# Patient Record
Sex: FEMALE | Race: WHITE | NOT HISPANIC OR LATINO | URBAN - METROPOLITAN AREA
[De-identification: names, ages, dates, MRNs, and addresses within clinical notes are randomized per-mention and may not be internally consistent; named-entity substitution may affect disease eponyms.]

---

## 2021-09-09 ENCOUNTER — EMERGENCY (EMERGENCY)
Facility: HOSPITAL | Age: 56
LOS: 1 days | Discharge: HOME | End: 2021-09-09
Attending: EMERGENCY MEDICINE | Admitting: EMERGENCY MEDICINE
Payer: COMMERCIAL

## 2021-09-09 VITALS
RESPIRATION RATE: 18 BRPM | TEMPERATURE: 98 F | OXYGEN SATURATION: 96 % | HEART RATE: 77 BPM | SYSTOLIC BLOOD PRESSURE: 154 MMHG | DIASTOLIC BLOOD PRESSURE: 87 MMHG

## 2021-09-09 VITALS
HEIGHT: 64 IN | OXYGEN SATURATION: 99 % | TEMPERATURE: 99 F | WEIGHT: 115.08 LBS | DIASTOLIC BLOOD PRESSURE: 97 MMHG | HEART RATE: 98 BPM | SYSTOLIC BLOOD PRESSURE: 145 MMHG | RESPIRATION RATE: 18 BRPM

## 2021-09-09 DIAGNOSIS — R00.2 PALPITATIONS: ICD-10-CM

## 2021-09-09 DIAGNOSIS — I10 ESSENTIAL (PRIMARY) HYPERTENSION: ICD-10-CM

## 2021-09-09 DIAGNOSIS — Z98.890 OTHER SPECIFIED POSTPROCEDURAL STATES: ICD-10-CM

## 2021-09-09 DIAGNOSIS — F17.200 NICOTINE DEPENDENCE, UNSPECIFIED, UNCOMPLICATED: ICD-10-CM

## 2021-09-09 LAB
ALBUMIN SERPL ELPH-MCNC: 4.9 G/DL — SIGNIFICANT CHANGE UP (ref 3.3–5)
ALP SERPL-CCNC: 63 U/L — SIGNIFICANT CHANGE UP (ref 40–120)
ALT FLD-CCNC: 14 U/L — SIGNIFICANT CHANGE UP (ref 10–45)
ANION GAP SERPL CALC-SCNC: 12 MMOL/L — SIGNIFICANT CHANGE UP (ref 5–17)
AST SERPL-CCNC: 26 U/L — SIGNIFICANT CHANGE UP (ref 10–40)
BASOPHILS # BLD AUTO: 0.06 K/UL — SIGNIFICANT CHANGE UP (ref 0–0.2)
BASOPHILS NFR BLD AUTO: 0.6 % — SIGNIFICANT CHANGE UP (ref 0–2)
BILIRUB SERPL-MCNC: 0.2 MG/DL — SIGNIFICANT CHANGE UP (ref 0.2–1.2)
BUN SERPL-MCNC: 10 MG/DL — SIGNIFICANT CHANGE UP (ref 7–23)
CALCIUM SERPL-MCNC: 9.8 MG/DL — SIGNIFICANT CHANGE UP (ref 8.4–10.5)
CHLORIDE SERPL-SCNC: 96 MMOL/L — SIGNIFICANT CHANGE UP (ref 96–108)
CO2 SERPL-SCNC: 26 MMOL/L — SIGNIFICANT CHANGE UP (ref 22–31)
CREAT SERPL-MCNC: 0.48 MG/DL — LOW (ref 0.5–1.3)
D DIMER BLD IA.RAPID-MCNC: <150 NG/ML DDU — SIGNIFICANT CHANGE UP
EOSINOPHIL # BLD AUTO: 0.01 K/UL — SIGNIFICANT CHANGE UP (ref 0–0.5)
EOSINOPHIL NFR BLD AUTO: 0.1 % — SIGNIFICANT CHANGE UP (ref 0–6)
GLUCOSE SERPL-MCNC: 123 MG/DL — HIGH (ref 70–99)
HCT VFR BLD CALC: 40.7 % — SIGNIFICANT CHANGE UP (ref 34.5–45)
HGB BLD-MCNC: 13.9 G/DL — SIGNIFICANT CHANGE UP (ref 11.5–15.5)
IMM GRANULOCYTES NFR BLD AUTO: 0.5 % — SIGNIFICANT CHANGE UP (ref 0–1.5)
LYMPHOCYTES # BLD AUTO: 1.69 K/UL — SIGNIFICANT CHANGE UP (ref 1–3.3)
LYMPHOCYTES # BLD AUTO: 18.1 % — SIGNIFICANT CHANGE UP (ref 13–44)
MAGNESIUM SERPL-MCNC: 1.7 MG/DL — SIGNIFICANT CHANGE UP (ref 1.6–2.6)
MCHC RBC-ENTMCNC: 34.2 GM/DL — SIGNIFICANT CHANGE UP (ref 32–36)
MCHC RBC-ENTMCNC: 34.4 PG — HIGH (ref 27–34)
MCV RBC AUTO: 100.7 FL — HIGH (ref 80–100)
MONOCYTES # BLD AUTO: 1.07 K/UL — HIGH (ref 0–0.9)
MONOCYTES NFR BLD AUTO: 11.5 % — SIGNIFICANT CHANGE UP (ref 2–14)
NEUTROPHILS # BLD AUTO: 6.45 K/UL — SIGNIFICANT CHANGE UP (ref 1.8–7.4)
NEUTROPHILS NFR BLD AUTO: 69.2 % — SIGNIFICANT CHANGE UP (ref 43–77)
NRBC # BLD: 0 /100 WBCS — SIGNIFICANT CHANGE UP (ref 0–0)
PLATELET # BLD AUTO: 277 K/UL — SIGNIFICANT CHANGE UP (ref 150–400)
POTASSIUM SERPL-MCNC: 3.8 MMOL/L — SIGNIFICANT CHANGE UP (ref 3.5–5.3)
POTASSIUM SERPL-SCNC: 3.8 MMOL/L — SIGNIFICANT CHANGE UP (ref 3.5–5.3)
PROT SERPL-MCNC: 7.8 G/DL — SIGNIFICANT CHANGE UP (ref 6–8.3)
RBC # BLD: 4.04 M/UL — SIGNIFICANT CHANGE UP (ref 3.8–5.2)
RBC # FLD: 12.1 % — SIGNIFICANT CHANGE UP (ref 10.3–14.5)
SODIUM SERPL-SCNC: 134 MMOL/L — LOW (ref 135–145)
TROPONIN T SERPL-MCNC: 0.01 NG/ML — SIGNIFICANT CHANGE UP (ref 0–0.01)
WBC # BLD: 9.33 K/UL — SIGNIFICANT CHANGE UP (ref 3.8–10.5)
WBC # FLD AUTO: 9.33 K/UL — SIGNIFICANT CHANGE UP (ref 3.8–10.5)

## 2021-09-09 PROCEDURE — 83735 ASSAY OF MAGNESIUM: CPT

## 2021-09-09 PROCEDURE — 99285 EMERGENCY DEPT VISIT HI MDM: CPT

## 2021-09-09 PROCEDURE — 99283 EMERGENCY DEPT VISIT LOW MDM: CPT

## 2021-09-09 PROCEDURE — 85025 COMPLETE CBC W/AUTO DIFF WBC: CPT

## 2021-09-09 PROCEDURE — 93010 ELECTROCARDIOGRAM REPORT: CPT

## 2021-09-09 PROCEDURE — 84484 ASSAY OF TROPONIN QUANT: CPT

## 2021-09-09 PROCEDURE — 80053 COMPREHEN METABOLIC PANEL: CPT

## 2021-09-09 PROCEDURE — 36415 COLL VENOUS BLD VENIPUNCTURE: CPT

## 2021-09-09 PROCEDURE — 93005 ELECTROCARDIOGRAM TRACING: CPT

## 2021-09-09 PROCEDURE — 85379 FIBRIN DEGRADATION QUANT: CPT

## 2021-09-09 NOTE — ED ADULT NURSE NOTE - OBJECTIVE STATEMENT
Patient alert and oriented x 3 came c/o sudden onset of palpitations this afternoon while resting at home , went to  and sent here for evaluation  . Denies any sob  nor chest pain . Feels much better now , seen and examined by Dr. Kamara , all labs sent .safety maintained .,

## 2021-09-09 NOTE — ED ADULT NURSE NOTE - NSIMPLEMENTINTERV_GEN_ALL_ED
Implemented All Universal Safety Interventions:  [place] to call system. Call bell, personal items and telephone within reach. Instruct patient to call for assistance. Room bathroom lighting operational. Non-slip footwear when patient is off stretcher. Physically safe environment: no spills, clutter or unnecessary equipment. Stretcher in lowest position, wheels locked, appropriate side rails in place.

## 2021-09-09 NOTE — ED PROVIDER NOTE - PROGRESS NOTE DETAILS
work up wnl, requesting dc, Discussed with pt results of work up, strict return precautions, and need for follow up.  Pt expressed understanding and agrees with plan.

## 2021-09-09 NOTE — ED PROVIDER NOTE - PATIENT PORTAL LINK FT
You can access the FollowMyHealth Patient Portal offered by [facility] by registering at the following website: [email or web address]. By joining [employer]’s FollowMyHealth portal, you will also be able to view your health information using other applications (apps) compatible with our system.

## 2021-09-09 NOTE — ED PROVIDER NOTE - CLINICAL SUMMARY MEDICAL DECISION MAKING FREE TEXT BOX
57 y/o F here concerned for elevated BP and palpitations. Anxious appearing. Differentials Dx include ACS, PE, dehydration, or anxiety. Obtain lab and EKG. If no acute process, f/u w/ PCP.

## 2021-09-09 NOTE — ED PROVIDER NOTE - NSFOLLOWUPINSTRUCTIONS_ED_ALL_ED_FT
Can take tylenol 650mg every 6hrs as needed for pain.  Follow up with primary doctor within 1-2 days.  Follow up with cardiologist within 1-2 days. Can call [phone] ([facility]) to schedule appointment.   Return to ER for persistent fever/vomit, uncontrolled pain, worsening breathing, worsening lightheaded, focal weakness/numbness.    Nonspecific Chest Pain  Chest pain can be caused by many different conditions. It can be caused by a condition that is life-threatening and requires treatment right away. It can also be caused by something that is not life-threatening. If you have chest pain, it can be hard to know the difference, so it is important to get help right away to make sure that you do not have a serious condition.    Some life-threatening causes of chest pain include:  Heart attack.  A tear in the body's main blood vessel (aortic dissection).  Inflammation around your heart (pericarditis).  A problem in the lungs, such as a blood clot (pulmonary embolism) or a collapsed lung (pneumothorax).    Some non life-threatening causes of chest pain include:  Heartburn.  Anxiety or stress.  Damage to the bones, muscles, and cartilage that make up your chest wall.  Pneumonia or bronchitis.  Shingles infection (varicella-zoster virus).    Chest pain can feel like:  Pain or discomfort on the surface of your chest or deep in your chest.  Crushing, pressure, aching, or squeezing pain.  Burning or tingling.  Dull or sharp pain that is worse when you move, cough, or take a deep breath.  Pain or discomfort that is also felt in your back, neck, jaw, shoulder, or arm, or pain that spreads to any of these areas.  Your chest pain may come and go. It may also be constant. Your health care provider will do lab tests and other studies to find the cause of your pain. Treatment will depend on the cause of your chest pain.    Follow these instructions at home:  Pay attention to any changes in your symptoms. Tell your health care provider about them or any new symptoms. Follow these instructions from your health care provider.    Medicines   Take over-the-counter and prescription medicines only as told by your health care provider.  If you were prescribed an antibiotic, take it as told by your health care provider. Do not stop taking the antibiotic even if you start to feel better.    Lifestyle    Rest as directed by your health care provider.  Do not use any products that contain nicotine or tobacco, such as cigarettes and e-cigarettes. If you need help quitting, ask your health care provider.  Do not drink alcohol.  Make healthy lifestyle choices as recommended. These may include:  Getting regular exercise. Ask your health care provider to suggest some activities that are safe for you.  Eating a heart-healthy diet. This includes plenty of fresh fruits and vegetables, whole grains, low-fat (lean) protein, and low-fat dairy products. A dietitian can help you find healthy eating options.  Maintaining a healthy weight.  Managing any other health conditions you have, such as hypertension or diabetes.  Reducing stress, such as with yoga or relaxation techniques.    General instructions   Avoid any activities that bring on chest pain.  Keep all follow-up visits as told by your health care provider. This is important. This includes visits for any further testing if your chest pain does not go away.    Contact a health care provider if:  Your chest pain does not go away.  You feel depressed.  You have a fever.    Get help right away if:  Your chest pain gets worse.  You have a cough that gets worse, or you cough up blood.  You have severe pain in your abdomen.  You faint.  You have sudden, unexplained chest discomfort.  You have sudden, unexplained discomfort in your arms, back, neck, or jaw.  You have shortness of breath at any time.  You suddenly start to sweat, or your skin gets clammy.  You feel nausea or you vomit.  You suddenly feel lightheaded or dizzy.  You have severe weakness, or unexplained weakness or fatigue.  Your heart begins to beat quickly, or it feels like it is skipping beats.

## 2021-09-09 NOTE — ED PROVIDER NOTE - PHYSICAL EXAMINATION
CONSTITUTIONAL: Slightly anxious appearing. Awake, alert.   HEENT: Head is atraumatic. Eyes clear bilaterally, normal EOMI. Airway patent.  CARDIAC: Normal rate, regular rhythm.  Heart sounds S1, S2.   RESPIRATORY: Breath sounds clear and equal bilaterally. no tachypnea, respiratory distress.   GASTROINTESTINAL: Abdomen soft, non-tender, no guarding, distension.  MUSCULOSKELETAL: Spine appears normal, no midline spinal tenderness, range of motion is not limited, no muscle or joint tenderness. no bony tenderness.   NEUROLOGICAL: Alert, no focal deficits, no motor or sensory deficits.  SKIN: Skin normal color for race, warm, dry and intact. No evidence of rash.  PSYCHIATRIC: Normal mood and affect. no apparent risk to self or others.

## 2021-09-09 NOTE — ED PROVIDER NOTE - OBJECTIVE STATEMENT
57 y/o F w/ Hx of HTN presents for palpitations, concern for elevated BP. Pt report being on coumadin and ***. Denies current CP, SOB, leg swelling. Reports prior breast lump removed 10 yrs ago. Denies lightheadedness, dizziness, syncope, abd pain, V/D. States she exerted herself more than usual yesterday and was outside in the heat; normally at home. 57 y/o F w/ Hx of HTN presents for palpitations, concern for elevated BP. Pt report being on clonidine and ***. Denies current CP, SOB, leg swelling. Reports prior breast lump removed 10 yrs ago. Denies lightheadedness, dizziness, syncope, abd pain, V/D. States she exerted herself more than usual yesterday and was outside in the heat; normally at home. 57 y/o F w/ Hx of HTN presents for palpitations, concern for elevated BP. Denies current CP, SOB, leg swelling. Reports prior breast lump removed 10 yrs ago. Denies lightheadedness, dizziness, syncope, abd pain, V/D. States she exerted herself more than usual yesterday and was outside in the heat; normally at home.  Denies associated SOB, NVD, lightheaded, diaphoresis, palpitations, cough/rhinorrhea,  LE pain/swelling, focal weakness/numbness, recent travel/immobilization, abd pain, urinary complaints, f/c.  No known prior cardiac w/u.

## 2022-01-24 ENCOUNTER — EMERGENCY (EMERGENCY)
Facility: HOSPITAL | Age: 57
LOS: 1 days | Discharge: HOME | End: 2022-01-24
Attending: EMERGENCY MEDICINE | Admitting: EMERGENCY MEDICINE
Payer: COMMERCIAL

## 2022-01-24 VITALS
HEIGHT: 64 IN | WEIGHT: 119.93 LBS | OXYGEN SATURATION: 96 % | TEMPERATURE: 98 F | HEART RATE: 84 BPM | DIASTOLIC BLOOD PRESSURE: 90 MMHG | SYSTOLIC BLOOD PRESSURE: 141 MMHG | RESPIRATION RATE: 18 BRPM
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
## 2022-01-24 DIAGNOSIS — S00.83XA CONTUSION OF OTHER PART OF HEAD, INITIAL ENCOUNTER: ICD-10-CM

## 2022-01-24 DIAGNOSIS — R19.7 DIARRHEA, UNSPECIFIED: ICD-10-CM

## 2022-01-24 DIAGNOSIS — I10 ESSENTIAL (PRIMARY) HYPERTENSION: ICD-10-CM

## 2022-01-24 DIAGNOSIS — Z20.822 CONTACT WITH AND (SUSPECTED) EXPOSURE TO COVID-19: ICD-10-CM

## 2022-01-24 DIAGNOSIS — R56.9 UNSPECIFIED CONVULSIONS: ICD-10-CM
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
## 2022-01-24 DIAGNOSIS — S09.90XA UNSPECIFIED INJURY OF HEAD, INITIAL ENCOUNTER: ICD-10-CM

## 2022-01-24 PROCEDURE — 71045 X-RAY EXAM CHEST 1 VIEW: CPT | Mod: 26

## 2022-01-24 PROCEDURE — 99285 EMERGENCY DEPT VISIT HI MDM: CPT

## 2022-01-24 RX ORDER — SODIUM CHLORIDE 9 MG/ML
1000 INJECTION INTRAMUSCULAR; INTRAVENOUS; SUBCUTANEOUS ONCE
Refills: 0 | Status: COMPLETED | OUTPATIENT
Start: 2022-01-24 | End: 2022-01-24

## 2022-01-24 NOTE — ED PROVIDER NOTE - CLINICAL SUMMARY MEDICAL DECISION MAKING FREE TEXT BOX
55 y/o F pt presents to ED with seizure and head injury, unclear trigger. Plan for labs, CT head, work up for infectious source, and reassess. 57 y/o F pt presents to ED with seizure and head injury, pt poor historian, missed a recent dose of meds.  Noted hematoma to L forehead, no other  injury to body. Plan for labs, CT head, work up for infectious source/lyte abnormality, keppra load and reassess.

## 2022-01-24 NOTE — ED PROVIDER NOTE - PATIENT PORTAL LINK FT
You can access the FollowMyHealth Patient Portal offered by [facility] by registering at the following website: [email or web address]. By joining [employer]’s FollowMyHealth portal, you will also be able to view your health information using other applications (apps) compatible with our system.

## 2022-01-24 NOTE — ED PROVIDER NOTE - PROGRESS NOTE DETAILS
Pt in NAD, CTs normal, UA, ambulating,  to pick pt up,  states fu at [facility] neurology, Dr. Cohn.

## 2022-01-24 NOTE — ED PROVIDER NOTE - NSFOLLOWUPINSTRUCTIONS_ED_ALL_ED_FT
PLEASE CONTINUE TO TAKE ALL SEIZURE MEDICATIONS AS PRESCRIBED AND FOLLOW UP WITH YOUR DOCTOR AT [facility].     Seizure    A seizure is abnormal electrical activity in the brain; the specific cause may or may not be found. Prior to a seizure you may experience a warning sensation (aura) that may include fear, nausea, dizziness, and visual changes such as flashing lights of spots. Common symptoms during the seizure may include an altered mental status, rhythmic jerking movements, drooling, grunting, loss of bladder or bowel control, or tongue biting. After a seizure, you may feel confused and sleepy.     Do not swim, drive, operate machinery, or engage in any risky activity during which a seizure could cause further injury to you or others. Teach friends and family what to do if you HAVE a seizure which includes laying you on the ground with your head on a cushion and turning you to the side to keep your breathing passages clear in case of vomiting.    SEEK IMMEDIATE MEDICAL CARE IF YOU HAVE ANY OF THE FOLLOWING SYMPTOMS: seizure lasting over 5 minutes, not waking up or persistent altered mental status after the seizure, or more frequent or worsening seizures.

## 2022-01-24 NOTE — ED ADULT TRIAGE NOTE - CHIEF COMPLAINT QUOTE
Pt reports seizure. Per family tonic clonic seizure tonight lasting "12 minutes." Post-ictal for EMS. Pt head on ground, hematoma to L forehead. Pt Aox3 at this time.

## 2022-01-24 NOTE — ED ADULT TRIAGE NOTE - WILL THE PATIENT ACCEPT THE PFIZER COVID-19 VACCINE IF ELIGIBLE AND IT IS AVAILABLE?
Not applicable
Monica Lundberg  (RN)  2018 13:32:25
Statement Selected
Isreal Williamson)  2018 19:07:35

## 2022-01-24 NOTE — ED PROVIDER NOTE - OBJECTIVE STATEMENT
55 y/o F pt with PMhx of HTN and epilepsy presents to ED c/o seizure x 1 episode earlier today. Pt has no recollection of events, but her  states he heard a loud thud in the opposite room. In ED, pt with hematoma to L forehead, has some associated diarrhea, but denies cough, fever, chills, abdominal pain, shortness of breath, chest pain, or any other acute complaints. Pt takes Keppra 500mg BID, but missed her dose on Friday. She is vaccinated for covid-19 x 2, last dose 4 months ago.

## 2022-01-24 NOTE — ED PROVIDER NOTE - MUSCULOSKELETAL, MLM
Spine appears normal, range of motion is not limited, no muscle or joint tenderness, hematoma to L forehead.

## 2022-01-25 VITALS
SYSTOLIC BLOOD PRESSURE: 129 MMHG | HEART RATE: 90 BPM | OXYGEN SATURATION: 98 % | TEMPERATURE: 99 F | DIASTOLIC BLOOD PRESSURE: 84 MMHG | RESPIRATION RATE: 18 BRPM

## 2022-01-25 PROBLEM — I10 ESSENTIAL (PRIMARY) HYPERTENSION: Chronic | Status: ACTIVE | Noted: 2021-09-09

## 2022-01-25 LAB
ALBUMIN SERPL ELPH-MCNC: 4.5 G/DL — SIGNIFICANT CHANGE UP (ref 3.3–5)
ALP SERPL-CCNC: 83 U/L — SIGNIFICANT CHANGE UP (ref 40–120)
ALT FLD-CCNC: 39 U/L — SIGNIFICANT CHANGE UP (ref 10–45)
AMPHET UR-MCNC: NEGATIVE — SIGNIFICANT CHANGE UP
ANION GAP SERPL CALC-SCNC: 15 MMOL/L — SIGNIFICANT CHANGE UP (ref 5–17)
APPEARANCE UR: CLEAR — SIGNIFICANT CHANGE UP
AST SERPL-CCNC: 78 U/L — HIGH (ref 10–40)
BARBITURATES UR SCN-MCNC: NEGATIVE — SIGNIFICANT CHANGE UP
BASOPHILS # BLD AUTO: 0.05 K/UL — SIGNIFICANT CHANGE UP (ref 0–0.2)
BASOPHILS NFR BLD AUTO: 0.5 % — SIGNIFICANT CHANGE UP (ref 0–2)
BENZODIAZ UR-MCNC: NEGATIVE — SIGNIFICANT CHANGE UP
BILIRUB SERPL-MCNC: 0.5 MG/DL — SIGNIFICANT CHANGE UP (ref 0.2–1.2)
BILIRUB UR-MCNC: NEGATIVE — SIGNIFICANT CHANGE UP
BUN SERPL-MCNC: 4 MG/DL — LOW (ref 7–23)
CALCIUM SERPL-MCNC: 8.8 MG/DL — SIGNIFICANT CHANGE UP (ref 8.4–10.5)
CHLORIDE SERPL-SCNC: 90 MMOL/L — LOW (ref 96–108)
CO2 SERPL-SCNC: 25 MMOL/L — SIGNIFICANT CHANGE UP (ref 22–31)
COCAINE METAB.OTHER UR-MCNC: NEGATIVE — SIGNIFICANT CHANGE UP
COLOR SPEC: YELLOW — SIGNIFICANT CHANGE UP
CREAT SERPL-MCNC: 0.3 MG/DL — LOW (ref 0.5–1.3)
DIFF PNL FLD: NEGATIVE — SIGNIFICANT CHANGE UP
EOSINOPHIL # BLD AUTO: 0 K/UL — SIGNIFICANT CHANGE UP (ref 0–0.5)
EOSINOPHIL NFR BLD AUTO: 0 % — SIGNIFICANT CHANGE UP (ref 0–6)
GLUCOSE SERPL-MCNC: 134 MG/DL — HIGH (ref 70–99)
GLUCOSE UR QL: NEGATIVE — SIGNIFICANT CHANGE UP
HCT VFR BLD CALC: 33.8 % — LOW (ref 34.5–45)
HGB BLD-MCNC: 12 G/DL — SIGNIFICANT CHANGE UP (ref 11.5–15.5)
IMM GRANULOCYTES NFR BLD AUTO: 0.7 % — SIGNIFICANT CHANGE UP (ref 0–1.5)
KETONES UR-MCNC: 15 MG/DL
LEUKOCYTE ESTERASE UR-ACNC: NEGATIVE — SIGNIFICANT CHANGE UP
LYMPHOCYTES # BLD AUTO: 0.63 K/UL — LOW (ref 1–3.3)
LYMPHOCYTES # BLD AUTO: 5.9 % — LOW (ref 13–44)
MCHC RBC-ENTMCNC: 35.5 GM/DL — SIGNIFICANT CHANGE UP (ref 32–36)
MCHC RBC-ENTMCNC: 35.6 PG — HIGH (ref 27–34)
MCV RBC AUTO: 100.3 FL — HIGH (ref 80–100)
METHADONE UR-MCNC: NEGATIVE — SIGNIFICANT CHANGE UP
MONOCYTES # BLD AUTO: 1.17 K/UL — HIGH (ref 0–0.9)
MONOCYTES NFR BLD AUTO: 10.9 % — SIGNIFICANT CHANGE UP (ref 2–14)
NEUTROPHILS # BLD AUTO: 8.8 K/UL — HIGH (ref 1.8–7.4)
NEUTROPHILS NFR BLD AUTO: 82 % — HIGH (ref 43–77)
NITRITE UR-MCNC: NEGATIVE — SIGNIFICANT CHANGE UP
NRBC # BLD: 0 /100 WBCS — SIGNIFICANT CHANGE UP (ref 0–0)
OPIATES UR-MCNC: NEGATIVE — SIGNIFICANT CHANGE UP
PCP SPEC-MCNC: SIGNIFICANT CHANGE UP
PCP UR-MCNC: NEGATIVE — SIGNIFICANT CHANGE UP
PH UR: 6.5 — SIGNIFICANT CHANGE UP (ref 5–8)
PLATELET # BLD AUTO: 197 K/UL — SIGNIFICANT CHANGE UP (ref 150–400)
POTASSIUM SERPL-MCNC: 3.7 MMOL/L — SIGNIFICANT CHANGE UP (ref 3.5–5.3)
POTASSIUM SERPL-SCNC: 3.7 MMOL/L — SIGNIFICANT CHANGE UP (ref 3.5–5.3)
PROT SERPL-MCNC: 6.7 G/DL — SIGNIFICANT CHANGE UP (ref 6–8.3)
PROT UR-MCNC: NEGATIVE MG/DL — SIGNIFICANT CHANGE UP
RBC # BLD: 3.37 M/UL — LOW (ref 3.8–5.2)
RBC # FLD: 13.9 % — SIGNIFICANT CHANGE UP (ref 10.3–14.5)
SARS-COV-2 RNA SPEC QL NAA+PROBE: NEGATIVE — SIGNIFICANT CHANGE UP
SODIUM SERPL-SCNC: 130 MMOL/L — LOW (ref 135–145)
SP GR SPEC: 1.01 — SIGNIFICANT CHANGE UP (ref 1–1.03)
THC UR QL: NEGATIVE — SIGNIFICANT CHANGE UP
UROBILINOGEN FLD QL: 0.2 E.U./DL — SIGNIFICANT CHANGE UP
WBC # BLD: 10.72 K/UL — HIGH (ref 3.8–10.5)
WBC # FLD AUTO: 10.72 K/UL — HIGH (ref 3.8–10.5)

## 2022-01-25 PROCEDURE — 80307 DRUG TEST PRSMV CHEM ANLYZR: CPT

## 2022-01-25 PROCEDURE — 96374 THER/PROPH/DIAG INJ IV PUSH: CPT

## 2022-01-25 PROCEDURE — 70486 CT MAXILLOFACIAL W/O DYE: CPT | Mod: 26,MA

## 2022-01-25 PROCEDURE — 70450 CT HEAD/BRAIN W/O DYE: CPT | Mod: MA

## 2022-01-25 PROCEDURE — 85025 COMPLETE CBC W/AUTO DIFF WBC: CPT

## 2022-01-25 PROCEDURE — 81003 URINALYSIS AUTO W/O SCOPE: CPT

## 2022-01-25 PROCEDURE — 99285 EMERGENCY DEPT VISIT HI MDM: CPT | Mod: 25

## 2022-01-25 PROCEDURE — 71045 X-RAY EXAM CHEST 1 VIEW: CPT

## 2022-01-25 PROCEDURE — 87635 SARS-COV-2 COVID-19 AMP PRB: CPT

## 2022-01-25 PROCEDURE — 70450 CT HEAD/BRAIN W/O DYE: CPT | Mod: 26,MA

## 2022-01-25 PROCEDURE — 93005 ELECTROCARDIOGRAM TRACING: CPT

## 2022-01-25 PROCEDURE — 36415 COLL VENOUS BLD VENIPUNCTURE: CPT

## 2022-01-25 PROCEDURE — 70486 CT MAXILLOFACIAL W/O DYE: CPT | Mod: MA

## 2022-01-25 PROCEDURE — 80053 COMPREHEN METABOLIC PANEL: CPT

## 2022-01-25 RX ORDER — LEVETIRACETAM 250 MG/1
1000 TABLET, FILM COATED ORAL ONCE
Refills: 0 | Status: COMPLETED | OUTPATIENT
Start: 2022-01-25 | End: 2022-01-25

## 2022-01-25 RX ADMIN — SODIUM CHLORIDE 2000 MILLILITER(S): 9 INJECTION INTRAMUSCULAR; INTRAVENOUS; SUBCUTANEOUS at 00:09

## 2022-01-25 RX ADMIN — LEVETIRACETAM 400 MILLIGRAM(S): 250 TABLET, FILM COATED ORAL at 01:03

## 2022-01-25 NOTE — ED ADULT NURSE NOTE - OBJECTIVE STATEMENT
received A&OX3, but non-compliant with her seziure medication pt with c/o of ' I had a seizure.' pt doesn't remember the event, but the  heard " loud thud" when the pt fell. pt denies chest pain, headache. pt is on Keppra according the , but missed a dosage on Friday.

## 2022-07-27 ENCOUNTER — INPATIENT (INPATIENT)
Facility: HOSPITAL | Age: 57
LOS: 1 days | Discharge: HOME | DRG: 896 | End: 2022-07-29
Attending: STUDENT IN AN ORGANIZED HEALTH CARE EDUCATION/TRAINING PROGRAM | Admitting: STUDENT IN AN ORGANIZED HEALTH CARE EDUCATION/TRAINING PROGRAM
Payer: COMMERCIAL

## 2022-07-27 VITALS
DIASTOLIC BLOOD PRESSURE: 95 MMHG | SYSTOLIC BLOOD PRESSURE: 167 MMHG | RESPIRATION RATE: 16 BRPM | HEIGHT: 64 IN | WEIGHT: 119.93 LBS | HEART RATE: 76 BPM | OXYGEN SATURATION: 98 % | TEMPERATURE: 98 F

## 2022-07-27 DIAGNOSIS — E87.2 ACIDOSIS: ICD-10-CM

## 2022-07-27 DIAGNOSIS — D64.9 ANEMIA, UNSPECIFIED: ICD-10-CM

## 2022-07-27 DIAGNOSIS — F10.239 ALCOHOL DEPENDENCE WITH WITHDRAWAL, UNSPECIFIED: ICD-10-CM

## 2022-07-27 DIAGNOSIS — R63.8 OTHER SYMPTOMS AND SIGNS CONCERNING FOOD AND FLUID INTAKE: ICD-10-CM

## 2022-07-27 DIAGNOSIS — R56.9 UNSPECIFIED CONVULSIONS: ICD-10-CM

## 2022-07-27 DIAGNOSIS — E87.1 HYPO-OSMOLALITY AND HYPONATREMIA: ICD-10-CM

## 2022-07-27 DIAGNOSIS — I10 ESSENTIAL (PRIMARY) HYPERTENSION: ICD-10-CM

## 2022-07-27 PROBLEM — G40.909 EPILEPSY, UNSPECIFIED, NOT INTRACTABLE, WITHOUT STATUS EPILEPTICUS: Chronic | Status: ACTIVE | Noted: 2022-01-25

## 2022-07-27 LAB
ALBUMIN SERPL ELPH-MCNC: 4.6 G/DL — SIGNIFICANT CHANGE UP (ref 3.3–5)
ALP SERPL-CCNC: 111 U/L — SIGNIFICANT CHANGE UP (ref 40–120)
ALT FLD-CCNC: 26 U/L — SIGNIFICANT CHANGE UP (ref 10–45)
AMPHET UR-MCNC: NEGATIVE — SIGNIFICANT CHANGE UP
ANION GAP SERPL CALC-SCNC: 12 MMOL/L — SIGNIFICANT CHANGE UP (ref 5–17)
ANION GAP SERPL CALC-SCNC: 24 MMOL/L — HIGH (ref 5–17)
APAP SERPL-MCNC: <5 UG/ML — LOW (ref 10–30)
APPEARANCE UR: CLEAR — SIGNIFICANT CHANGE UP
AST SERPL-CCNC: 55 U/L — HIGH (ref 10–40)
B-OH-BUTYR SERPL-SCNC: 1.1 MMOL/L — HIGH
BACTERIA # UR AUTO: PRESENT /HPF
BARBITURATES UR SCN-MCNC: NEGATIVE — SIGNIFICANT CHANGE UP
BASE EXCESS BLDV CALC-SCNC: 2.9 MMOL/L — SIGNIFICANT CHANGE UP (ref -2–3)
BASOPHILS # BLD AUTO: 0.03 K/UL — SIGNIFICANT CHANGE UP (ref 0–0.2)
BASOPHILS # BLD AUTO: 0.08 K/UL — SIGNIFICANT CHANGE UP (ref 0–0.2)
BASOPHILS NFR BLD AUTO: 0.5 % — SIGNIFICANT CHANGE UP (ref 0–2)
BASOPHILS NFR BLD AUTO: 0.9 % — SIGNIFICANT CHANGE UP (ref 0–2)
BENZODIAZ UR-MCNC: NEGATIVE — SIGNIFICANT CHANGE UP
BILIRUB SERPL-MCNC: 0.4 MG/DL — SIGNIFICANT CHANGE UP (ref 0.2–1.2)
BILIRUB UR-MCNC: NEGATIVE — SIGNIFICANT CHANGE UP
BLD GP AB SCN SERPL QL: NEGATIVE — SIGNIFICANT CHANGE UP
BUN SERPL-MCNC: 3 MG/DL — LOW (ref 7–23)
BUN SERPL-MCNC: 4 MG/DL — LOW (ref 7–23)
CA-I SERPL-SCNC: 1.11 MMOL/L — LOW (ref 1.15–1.33)
CALCIUM SERPL-MCNC: 8.7 MG/DL — SIGNIFICANT CHANGE UP (ref 8.4–10.5)
CALCIUM SERPL-MCNC: 9 MG/DL — SIGNIFICANT CHANGE UP (ref 8.4–10.5)
CHLORIDE SERPL-SCNC: 100 MMOL/L — SIGNIFICANT CHANGE UP (ref 96–108)
CHLORIDE SERPL-SCNC: 84 MMOL/L — LOW (ref 96–108)
CK MB CFR SERPL CALC: 7.1 NG/ML — HIGH (ref 0–6.7)
CK SERPL-CCNC: 269 U/L — HIGH (ref 25–170)
CO2 BLDV-SCNC: 29.2 MMOL/L — HIGH (ref 22–26)
CO2 SERPL-SCNC: 19 MMOL/L — LOW (ref 22–31)
CO2 SERPL-SCNC: 24 MMOL/L — SIGNIFICANT CHANGE UP (ref 22–31)
COCAINE METAB.OTHER UR-MCNC: NEGATIVE — SIGNIFICANT CHANGE UP
COD CRY URNS QL: ABNORMAL /HPF
COLOR SPEC: YELLOW — SIGNIFICANT CHANGE UP
CREAT ?TM UR-MCNC: 29 MG/DL — SIGNIFICANT CHANGE UP
CREAT ?TM UR-MCNC: 96 MG/DL — SIGNIFICANT CHANGE UP
CREAT SERPL-MCNC: 0.34 MG/DL — LOW (ref 0.5–1.3)
CREAT SERPL-MCNC: 0.38 MG/DL — LOW (ref 0.5–1.3)
DIFF PNL FLD: ABNORMAL
EGFR: 118 ML/MIN/1.73M2 — SIGNIFICANT CHANGE UP
EGFR: 121 ML/MIN/1.73M2 — SIGNIFICANT CHANGE UP
EOSINOPHIL # BLD AUTO: 0 K/UL — SIGNIFICANT CHANGE UP (ref 0–0.5)
EOSINOPHIL # BLD AUTO: 0.01 K/UL — SIGNIFICANT CHANGE UP (ref 0–0.5)
EOSINOPHIL NFR BLD AUTO: 0 % — SIGNIFICANT CHANGE UP (ref 0–6)
EOSINOPHIL NFR BLD AUTO: 0.2 % — SIGNIFICANT CHANGE UP (ref 0–6)
EPI CELLS # UR: SIGNIFICANT CHANGE UP /HPF (ref 0–5)
ETHANOL SERPL-MCNC: <10 MG/DL — SIGNIFICANT CHANGE UP (ref 0–10)
FERRITIN SERPL-MCNC: 64 NG/ML — SIGNIFICANT CHANGE UP (ref 15–150)
GAS PNL BLDV: 126 MMOL/L — LOW (ref 136–145)
GAS PNL BLDV: SIGNIFICANT CHANGE UP
GAS PNL BLDV: SIGNIFICANT CHANGE UP
GLUCOSE SERPL-MCNC: 124 MG/DL — HIGH (ref 70–99)
GLUCOSE SERPL-MCNC: 166 MG/DL — HIGH (ref 70–99)
GLUCOSE UR QL: 100
HCG SERPL-ACNC: 0 MIU/ML — SIGNIFICANT CHANGE UP
HCO3 BLDV-SCNC: 28 MMOL/L — SIGNIFICANT CHANGE UP (ref 22–29)
HCT VFR BLD CALC: 31 % — LOW (ref 34.5–45)
HCT VFR BLD CALC: 33.3 % — LOW (ref 34.5–45)
HGB BLD-MCNC: 10 G/DL — LOW (ref 11.5–15.5)
HGB BLD-MCNC: 10.7 G/DL — LOW (ref 11.5–15.5)
IMM GRANULOCYTES NFR BLD AUTO: 0.3 % — SIGNIFICANT CHANGE UP (ref 0–1.5)
IMM GRANULOCYTES NFR BLD AUTO: 0.5 % — SIGNIFICANT CHANGE UP (ref 0–1.5)
IRON SATN MFR SERPL: 23 % — SIGNIFICANT CHANGE UP (ref 14–50)
IRON SATN MFR SERPL: 84 UG/DL — SIGNIFICANT CHANGE UP (ref 30–160)
KETONES UR-MCNC: 15 MG/DL
LACTATE SERPL-SCNC: 1.3 MMOL/L — SIGNIFICANT CHANGE UP (ref 0.5–2)
LEUKOCYTE ESTERASE UR-ACNC: ABNORMAL
LYMPHOCYTES # BLD AUTO: 0.55 K/UL — LOW (ref 1–3.3)
LYMPHOCYTES # BLD AUTO: 0.75 K/UL — LOW (ref 1–3.3)
LYMPHOCYTES # BLD AUTO: 8.5 % — LOW (ref 13–44)
LYMPHOCYTES # BLD AUTO: 8.6 % — LOW (ref 13–44)
MAGNESIUM SERPL-MCNC: 2.4 MG/DL — SIGNIFICANT CHANGE UP (ref 1.6–2.6)
MCHC RBC-ENTMCNC: 29.2 PG — SIGNIFICANT CHANGE UP (ref 27–34)
MCHC RBC-ENTMCNC: 29.3 PG — SIGNIFICANT CHANGE UP (ref 27–34)
MCHC RBC-ENTMCNC: 32.1 GM/DL — SIGNIFICANT CHANGE UP (ref 32–36)
MCHC RBC-ENTMCNC: 32.3 GM/DL — SIGNIFICANT CHANGE UP (ref 32–36)
MCV RBC AUTO: 90.4 FL — SIGNIFICANT CHANGE UP (ref 80–100)
MCV RBC AUTO: 91.2 FL — SIGNIFICANT CHANGE UP (ref 80–100)
METHADONE UR-MCNC: NEGATIVE — SIGNIFICANT CHANGE UP
MONOCYTES # BLD AUTO: 1.03 K/UL — HIGH (ref 0–0.9)
MONOCYTES # BLD AUTO: 1.17 K/UL — HIGH (ref 0–0.9)
MONOCYTES NFR BLD AUTO: 13.5 % — SIGNIFICANT CHANGE UP (ref 2–14)
MONOCYTES NFR BLD AUTO: 16 % — HIGH (ref 2–14)
NEUTROPHILS # BLD AUTO: 4.81 K/UL — SIGNIFICANT CHANGE UP (ref 1.8–7.4)
NEUTROPHILS # BLD AUTO: 6.65 K/UL — SIGNIFICANT CHANGE UP (ref 1.8–7.4)
NEUTROPHILS NFR BLD AUTO: 74.5 % — SIGNIFICANT CHANGE UP (ref 43–77)
NEUTROPHILS NFR BLD AUTO: 76.5 % — SIGNIFICANT CHANGE UP (ref 43–77)
NITRITE UR-MCNC: NEGATIVE — SIGNIFICANT CHANGE UP
NRBC # BLD: 0 /100 WBCS — SIGNIFICANT CHANGE UP (ref 0–0)
NRBC # BLD: 0 /100 WBCS — SIGNIFICANT CHANGE UP (ref 0–0)
OPIATES UR-MCNC: NEGATIVE — SIGNIFICANT CHANGE UP
OSMOLALITY SERPL: 263 MOSM/KG — LOW (ref 275–300)
OSMOLALITY UR: 252 MOSM/KG — LOW (ref 300–900)
OSMOLALITY UR: 303 MOSM/KG — SIGNIFICANT CHANGE UP (ref 300–900)
PCO2 BLDV: 43 MMHG — HIGH (ref 39–42)
PCP SPEC-MCNC: SIGNIFICANT CHANGE UP
PCP UR-MCNC: NEGATIVE — SIGNIFICANT CHANGE UP
PH BLDV: 7.42 — SIGNIFICANT CHANGE UP (ref 7.32–7.43)
PH UR: 7.5 — SIGNIFICANT CHANGE UP (ref 5–8)
PHOSPHATE SERPL-MCNC: 2.4 MG/DL — LOW (ref 2.5–4.5)
PLATELET # BLD AUTO: 222 K/UL — SIGNIFICANT CHANGE UP (ref 150–400)
PLATELET # BLD AUTO: 238 K/UL — SIGNIFICANT CHANGE UP (ref 150–400)
PO2 BLDV: 40 MMHG — SIGNIFICANT CHANGE UP (ref 25–45)
POTASSIUM BLDV-SCNC: 3.4 MMOL/L — LOW (ref 3.5–5.1)
POTASSIUM SERPL-MCNC: 3.2 MMOL/L — LOW (ref 3.5–5.3)
POTASSIUM SERPL-MCNC: 3.5 MMOL/L — SIGNIFICANT CHANGE UP (ref 3.5–5.3)
POTASSIUM SERPL-SCNC: 3.2 MMOL/L — LOW (ref 3.5–5.3)
POTASSIUM SERPL-SCNC: 3.5 MMOL/L — SIGNIFICANT CHANGE UP (ref 3.5–5.3)
PROT SERPL-MCNC: 7.8 G/DL — SIGNIFICANT CHANGE UP (ref 6–8.3)
PROT UR-MCNC: ABNORMAL MG/DL
RBC # BLD: 3.43 M/UL — LOW (ref 3.8–5.2)
RBC # BLD: 3.43 M/UL — LOW (ref 3.8–5.2)
RBC # BLD: 3.65 M/UL — LOW (ref 3.8–5.2)
RBC # FLD: 18.7 % — HIGH (ref 10.3–14.5)
RBC # FLD: 18.8 % — HIGH (ref 10.3–14.5)
RBC CASTS # UR COMP ASSIST: < 5 /HPF — SIGNIFICANT CHANGE UP
RETICS #: 62.8 K/UL — SIGNIFICANT CHANGE UP (ref 25–125)
RETICS/RBC NFR: 1.8 % — SIGNIFICANT CHANGE UP (ref 0.5–2.5)
RH IG SCN BLD-IMP: POSITIVE — SIGNIFICANT CHANGE UP
SALICYLATES SERPL-MCNC: <0.3 MG/DL — LOW (ref 2.8–20)
SAO2 % BLDV: 63.4 % — LOW (ref 67–88)
SARS-COV-2 RNA SPEC QL NAA+PROBE: NEGATIVE — SIGNIFICANT CHANGE UP
SODIUM SERPL-SCNC: 127 MMOL/L — LOW (ref 135–145)
SODIUM SERPL-SCNC: 136 MMOL/L — SIGNIFICANT CHANGE UP (ref 135–145)
SODIUM UR-SCNC: 54 MMOL/L — SIGNIFICANT CHANGE UP
SODIUM UR-SCNC: 66 MMOL/L — SIGNIFICANT CHANGE UP
SP GR SPEC: 1.01 — SIGNIFICANT CHANGE UP (ref 1–1.03)
THC UR QL: NEGATIVE — SIGNIFICANT CHANGE UP
TIBC SERPL-MCNC: 360 UG/DL — SIGNIFICANT CHANGE UP (ref 220–430)
TRANSFERRIN SERPL-MCNC: 298 MG/DL — SIGNIFICANT CHANGE UP (ref 200–360)
TSH SERPL-MCNC: 1.88 UIU/ML — SIGNIFICANT CHANGE UP (ref 0.27–4.2)
UIBC SERPL-MCNC: 276 UG/DL — SIGNIFICANT CHANGE UP (ref 110–370)
UROBILINOGEN FLD QL: 0.2 E.U./DL — SIGNIFICANT CHANGE UP
WBC # BLD: 6.45 K/UL — SIGNIFICANT CHANGE UP (ref 3.8–10.5)
WBC # BLD: 8.69 K/UL — SIGNIFICANT CHANGE UP (ref 3.8–10.5)
WBC # FLD AUTO: 6.45 K/UL — SIGNIFICANT CHANGE UP (ref 3.8–10.5)
WBC # FLD AUTO: 8.69 K/UL — SIGNIFICANT CHANGE UP (ref 3.8–10.5)
WBC UR QL: < 5 /HPF — SIGNIFICANT CHANGE UP

## 2022-07-27 PROCEDURE — 70450 CT HEAD/BRAIN W/O DYE: CPT | Mod: 26,MA

## 2022-07-27 PROCEDURE — 99223 1ST HOSP IP/OBS HIGH 75: CPT | Mod: GC

## 2022-07-27 PROCEDURE — 99291 CRITICAL CARE FIRST HOUR: CPT

## 2022-07-27 PROCEDURE — 99223 1ST HOSP IP/OBS HIGH 75: CPT

## 2022-07-27 PROCEDURE — 71045 X-RAY EXAM CHEST 1 VIEW: CPT | Mod: 26

## 2022-07-27 PROCEDURE — 93010 ELECTROCARDIOGRAM REPORT: CPT

## 2022-07-27 PROCEDURE — 99252 IP/OBS CONSLTJ NEW/EST SF 35: CPT | Mod: GC

## 2022-07-27 PROCEDURE — 72125 CT NECK SPINE W/O DYE: CPT | Mod: 26,MA

## 2022-07-27 RX ORDER — SODIUM CHLORIDE 9 MG/ML
1000 INJECTION, SOLUTION INTRAVENOUS
Refills: 0 | Status: DISCONTINUED | OUTPATIENT
Start: 2022-07-27 | End: 2022-07-27

## 2022-07-27 RX ORDER — LEVETIRACETAM 250 MG/1
250 TABLET, FILM COATED ORAL ONCE
Refills: 0 | Status: COMPLETED | OUTPATIENT
Start: 2022-07-27 | End: 2022-07-27

## 2022-07-27 RX ORDER — LEVETIRACETAM 250 MG/1
750 TABLET, FILM COATED ORAL DAILY
Refills: 0 | Status: DISCONTINUED | OUTPATIENT
Start: 2022-07-28 | End: 2022-07-29

## 2022-07-27 RX ORDER — ONDANSETRON 8 MG/1
4 TABLET, FILM COATED ORAL EVERY 8 HOURS
Refills: 0 | Status: DISCONTINUED | OUTPATIENT
Start: 2022-07-27 | End: 2022-07-27

## 2022-07-27 RX ORDER — DIAZEPAM 5 MG
5 TABLET ORAL
Refills: 0 | Status: DISCONTINUED | OUTPATIENT
Start: 2022-07-27 | End: 2022-07-29

## 2022-07-27 RX ORDER — LEVETIRACETAM 250 MG/1
500 TABLET, FILM COATED ORAL EVERY 12 HOURS
Refills: 0 | Status: DISCONTINUED | OUTPATIENT
Start: 2022-07-27 | End: 2022-07-27

## 2022-07-27 RX ORDER — MAGNESIUM SULFATE 500 MG/ML
2 VIAL (ML) INJECTION ONCE
Refills: 0 | Status: COMPLETED | OUTPATIENT
Start: 2022-07-27 | End: 2022-07-27

## 2022-07-27 RX ORDER — ENOXAPARIN SODIUM 100 MG/ML
40 INJECTION SUBCUTANEOUS EVERY 24 HOURS
Refills: 0 | Status: DISCONTINUED | OUTPATIENT
Start: 2022-07-27 | End: 2022-07-29

## 2022-07-27 RX ORDER — THIAMINE MONONITRATE (VIT B1) 100 MG
500 TABLET ORAL DAILY
Refills: 0 | Status: COMPLETED | OUTPATIENT
Start: 2022-07-27 | End: 2022-07-29

## 2022-07-27 RX ORDER — ACETAMINOPHEN 500 MG
650 TABLET ORAL EVERY 6 HOURS
Refills: 0 | Status: DISCONTINUED | OUTPATIENT
Start: 2022-07-27 | End: 2022-07-29

## 2022-07-27 RX ORDER — POTASSIUM CHLORIDE 20 MEQ
40 PACKET (EA) ORAL EVERY 4 HOURS
Refills: 0 | Status: COMPLETED | OUTPATIENT
Start: 2022-07-27 | End: 2022-07-27

## 2022-07-27 RX ORDER — SODIUM CHLORIDE 9 MG/ML
1000 INJECTION INTRAMUSCULAR; INTRAVENOUS; SUBCUTANEOUS ONCE
Refills: 0 | Status: COMPLETED | OUTPATIENT
Start: 2022-07-27 | End: 2022-07-27

## 2022-07-27 RX ORDER — THIAMINE MONONITRATE (VIT B1) 100 MG
100 TABLET ORAL ONCE
Refills: 0 | Status: COMPLETED | OUTPATIENT
Start: 2022-07-27 | End: 2022-07-27

## 2022-07-27 RX ORDER — LANOLIN ALCOHOL/MO/W.PET/CERES
3 CREAM (GRAM) TOPICAL AT BEDTIME
Refills: 0 | Status: DISCONTINUED | OUTPATIENT
Start: 2022-07-27 | End: 2022-07-27

## 2022-07-27 RX ADMIN — LEVETIRACETAM 250 MILLIGRAM(S): 250 TABLET, FILM COATED ORAL at 18:34

## 2022-07-27 RX ADMIN — Medication 2 MILLIGRAM(S): at 01:52

## 2022-07-27 RX ADMIN — Medication 40 MILLIEQUIVALENT(S): at 18:34

## 2022-07-27 RX ADMIN — SODIUM CHLORIDE 100 MILLILITER(S): 9 INJECTION, SOLUTION INTRAVENOUS at 10:25

## 2022-07-27 RX ADMIN — SODIUM CHLORIDE 1000 MILLILITER(S): 9 INJECTION, SOLUTION INTRAVENOUS at 06:07

## 2022-07-27 RX ADMIN — Medication 100 MILLIGRAM(S): at 06:07

## 2022-07-27 RX ADMIN — Medication 40 MILLIEQUIVALENT(S): at 14:58

## 2022-07-27 RX ADMIN — SODIUM CHLORIDE 1000 MILLILITER(S): 9 INJECTION INTRAMUSCULAR; INTRAVENOUS; SUBCUTANEOUS at 01:52

## 2022-07-27 RX ADMIN — Medication 105 MILLIGRAM(S): at 11:50

## 2022-07-27 RX ADMIN — Medication 25 GRAM(S): at 08:02

## 2022-07-27 RX ADMIN — LEVETIRACETAM 400 MILLIGRAM(S): 250 TABLET, FILM COATED ORAL at 06:51

## 2022-07-27 NOTE — H&P ADULT - PROBLEM SELECTOR PLAN 3
Hx of HTN. /95 on admission. Currently stable.  Home meds: clonidine .1mg BID, hydralazine 25mg QID with likely poor adherence.    - continue to monitor BPs  - if hypertensive, can consider starting home hydralazine AG 24 w/ bicarb 19 on admission likely 2/2 starvation ketosis as elevated BHB and normal lactate. No osmolar gap.  . Calcium oxalate crystals on UA concerning for toxic ingestion.     - f/u repeat BMP   - monitor Is and Os AG 24 w/ bicarb 19 on admission likely 2/2 starvation ketosis as elevated BHB and normal lactate. No osmolar gap.     - f/u repeat BMP   - monitor Is and Os

## 2022-07-27 NOTE — ED ADULT TRIAGE NOTE - CHIEF COMPLAINT QUOTE
pt bibems, c/o witnessed seizure while in bed. per , pt seized ~2 minutes. denies hitting head. hx of seizures, on keppra.

## 2022-07-27 NOTE — H&P ADULT - NSHPPHYSICALEXAM_GEN_ALL_CORE
PHYSICAL EXAM:    Vital Signs Last 24 Hrs  T(C): 36.9 (27 Jul 2022 01:10), Max: 36.9 (27 Jul 2022 01:10)  T(F): 98.5 (27 Jul 2022 01:10), Max: 98.5 (27 Jul 2022 01:10)  HR: 66 (27 Jul 2022 06:24) (62 - 76)  BP: 135/91 (27 Jul 2022 06:24) (131/95 - 167/95)  BP(mean): --  RR: 16 (27 Jul 2022 06:24) (16 - 16)  SpO2: 97% (27 Jul 2022 06:24) (97% - 98%)    Parameters below as of 27 Jul 2022 06:24  Patient On (Oxygen Delivery Method): room air      I&O's Summary      General: NAD, well developed  HEENT: NC/AT; EOMI, PERRLA, anicteric sclera; moist mucosal membranes.  Neck: supple, trachea midline  Cardiovascular: RRR, +S1/S2; NO M/R/G  Respiratory: CTA B/L; no W/R/R  Gastrointestinal: soft, NT/ND; +BSx4  Extremities: WWP; no edema or cyanosis  Vascular: 2+ radial, DP/PT pulses B/L  Neurological: AAOx3; no focal deficits PHYSICAL EXAM:    Vital Signs Last 24 Hrs  T(C): 36.9 (27 Jul 2022 01:10), Max: 36.9 (27 Jul 2022 01:10)  T(F): 98.5 (27 Jul 2022 01:10), Max: 98.5 (27 Jul 2022 01:10)  HR: 66 (27 Jul 2022 06:24) (62 - 76)  BP: 135/91 (27 Jul 2022 06:24) (131/95 - 167/95)  BP(mean): --  RR: 16 (27 Jul 2022 06:24) (16 - 16)  SpO2: 97% (27 Jul 2022 06:24) (97% - 98%)    Parameters below as of 27 Jul 2022 06:24  Patient On (Oxygen Delivery Method): room air      I&O's Summary      General: disheveled, sitting up in bed anxious asking for blankets  HEENT: NC/AT; EOMI, PERRLA, anicteric sclera; moist mucosal membranes.  Neck: supple, trachea midline  Cardiovascular: RRR, +S1/S2; NO M/R/G  Respiratory: CTA B/L; no W/R/R  Gastrointestinal: soft, NT/ND; +BSx4  Extremities: WWP; no edema or cyanosis  Vascular: 2+ radial, DP/PT pulses B/L  Neurological: AAOx3; following commands, no focal deficits

## 2022-07-27 NOTE — ED PROVIDER NOTE - CARE PLAN
1 Principal Discharge DX:	Seizure   Principal Discharge DX:	Seizure  Secondary Diagnosis:	Hyponatremia

## 2022-07-27 NOTE — H&P ADULT - ASSESSMENT
56F PMH HTN, alcohol use disorder, recent seizure disorder (on keppra) presented with seizure that occurred 7/26 at 10:30pm c/b additional seizure in the ED.   56F PMH HTN, alcohol use disorder, recent seizure disorder (on keppra) presented with seizure that occurred 7/26 at 10:30pm c/b additional seizure in the ED.

## 2022-07-27 NOTE — H&P ADULT - PROBLEM SELECTOR PLAN 1
Pt with alcohol use disorder as per . Does not know how much pt drinks but often finds bottles of vodka in garbage. Suspected last drunk 7/25. Per , pt baseline AAOx2 (difficulty remembering time) and dependent on ADLs. Negative CT head and no FND on exam. Blood alcohol <10. Pt afebrile, no leukocytosis. Received 2mg ativan in ED.      On exam, pt mildly agitated and anxious in bed, non combative but needing redirection. AAOx3. Denies nausea, non tremulous, no hallucinations. CIWA 3.    - c/w D5/NS   - c/w CIWA protocol  - thiamine IV 500mg  - f/u folate, Vit B12  - f/u SBIRT consult Pt with alcohol use disorder as per . Does not know how much pt drinks but often finds bottles of vodka in garbage. Suspected last drunk 7/25. Per , pt baseline AAOx2 (difficulty remembering time) and dependent on ADLs. Negative CT head and no FND on exam. Blood alcohol <10. Pt afebrile, no leukocytosis. Received 2mg ativan in ED.      On exam, pt mildly agitated and anxious in bed, non combative but needing redirection. AAOx3. Denies nausea, non tremulous, no hallucinations. CIWA 3.    - d/c fluids  - c/w CIWA protocol  - thiamine IV 500mg  - started MV and folate  - f/u folate, Vit B12  - f/u SBIRT consult

## 2022-07-27 NOTE — ED PROVIDER NOTE - PHYSICAL EXAMINATION
generalized tonic clonic shaking - lasted 1-2min then resolved.  moving all extremities generalized tonic clonic shaking - lasted 1-2min then resolved.  moving all extremities  minimal lower lip swelling, possible intraoral superficial abrasion

## 2022-07-27 NOTE — CONSULT NOTE ADULT - ATTENDING COMMENTS
Pt reports having '5-6' seizures in life and admits that it is possible they could all be related to alcohol use of sudden stoppage.  FOr this current seizure, she had no alcohol for 2 days, but drinking a few glasses of wine regularly prior.  Notes indicate scrounging for vodka frequently.    vEEG only a few hours so far, but no sign of primary epileptic tendency.    Plan:  1) will continue vEEG  2) encourage continued outpt epilepsy medications - currently levetiracetam.  This population tends to do better with phenobarb due to long half-life and similarity to alcohol pharmacodynamics.
Julissa Ko [id number]  This is a 57 y/o female with h/o alcohol dependence, seizure disorder (on Keppra), (+) tremors, she received ativan 2mg, she is an active tobacco user, VSS, now somnolent with dry mucus membrane.  -AMS with somnolence  -alcohol dependence  -alcohol withdrawal but received only 2mg ativan  -starvation ketosis  >thiamine, folate  >CIWA; received only 2mg ativan so far  >repeat U/A, VBG, chemistry, mag, phos  >keppra level, EEG  This patient can be managed on the F; you may reconsult us as needed.  Please refer tot the resident's note above for the rest of the details.

## 2022-07-27 NOTE — ED PROVIDER NOTE - OBJECTIVE STATEMENT
"Chief Complaint   Patient presents with     Allied Health Visit     Weight Check     Wt 6 lb 13 oz (3.09 kg)  BMI 12.36 kg/m2 Estimated body mass index is 12.36 kg/(m^2) as calculated from the following:    Height as of 11/30/18: 1' 7.69\" (0.5 m).    Weight as of this encounter: 6 lb 13 oz (3.09 kg).  bp completed using cuff size: NA (Not Taken)       Health Maintenance addressed:  NONE    n/a    Antonia Vallejo MA     "
56F PMH HTN, seizures (on keppra, unknown dose) p/w concern for seizure. Hx obtained from  aat bedside. States that while she was in bed she had ~2min episode of generalized shaking/unresponsive, followed by confusion. States that she drinks etoh daily, but possibly did not have alcohol today.   Denies known trauma, vomiting, fevers, URI symptoms.

## 2022-07-27 NOTE — H&P ADULT - HISTORY OF PRESENT ILLNESS
Pt is 56F PMH HTN, alcohol use disorder, seizure (on keppra) presented with seizure that occurred 7/26 at 10:30pm. History obtained from pt and chart as pt is a poor historian. Pt has hx of 4 seizures prior to today for which she has presented to [facility] (last 01/2022). Pt has had poor follow up with an outpatient neurologist who put her on keppra. Baseline mental status is AAOx2, often confused and doesn't know the time. Pt has a history of alcohol use disorder and  does not know how much she drinks but says he often finds bottles of vodka in garbage cans. Pt was reportedly shakier than normal today and  asked her if she quit cold turkey to which she replied yes. This evening at 10:30pm, pt was lying in bed when she had an episode of shaking and rolled to the floor (mattress on ground) that lasted 2-3 minutes. No fecal or urinary incontinence. ROS otherwise negative for fever, chills, chest pain, sob, nausea, vomiting, diarrhea. Pt was somnolent after seizure and then presented to the ED where she had another seizure.  is unclear if she returned to baseline mental status before second seizure.     In the ED:  Initial vital signs: T: 98.5F, HR: 76, BP: 167/95, R: 16, SpO2: 98% on RA  ED course:   Labs: significant for hg 10.7, Na 127, , AG 24, HCO3 19  Imaging: CT head w/o mass effect or intracranial abnormality. CT C spine w/o fracture.   CXR: unremarkable  EKG: sinus tachycardia  Treatment: 2mg ativan, 1L NS  Consults: Critical care Pt is 56F PMH HTN, alcohol use disorder, seizure (on keppra) presented with seizure that occurred 7/26 at 10:30pm. History obtained from pt and chart as pt is a poor historian. Pt has hx of 4 seizures prior to today for which she has presented to [facility] (last 01/2022). Pt has had poor follow up with an outpatient neurologist who put her on keppra. Baseline mental status is AAOx2, often confused and doesn't know the time. Pt has a history of alcohol use disorder and  does not know how much she drinks but says he often finds bottles of vodka in garbage cans. Pt was reportedly shakier than normal today and  asked her if she quit cold turkey to which she replied yes. This evening at 10:30pm, pt was lying in bed when she had an episode of shaking and rolled to the floor (mattress on ground) that lasted 2-3 minutes. No fecal or urinary incontinence. ROS otherwise negative for fever, chills, chest pain, sob, nausea, vomiting, diarrhea. Pt was somnolent after seizure and then presented to the ED where she had another seizure.  is unclear if she returned to baseline mental status before second seizure.     In the ED:  Initial vital signs: T: 98.5F, HR: 76, BP: 167/95, R: 16, SpO2: 98% on RA  ED course:   Labs: significant for hg 10.7, Na 127, , AG 24, HCO3 19  Imaging: CT head w/o mass effect or intracranial abnormality. CT C spine w/o fracture.   CXR: unremarkable  EKG: sinus tachycardia  Treatment: 2mg ativan, 1L NS, 1L NS+_D5  Consults: Critical care Pt is 56F PMH HTN, alcohol use disorder, recent history of seizure (noncompliant on keppra) presented with seizure that occurred 7/26 at 10:30pm. History obtained from pt  (Garret [phone]) and chart as pt is a poor historian. Pt has hx of 4 seizures prior to today for which she last presented to [facility] (last 01/2022). Pt has had poor follow up with an outpatient neurologist who put her on keppra. Per the , patient's mental status has deteriorated since her January admission, unable to attend to ADLs on her own. Baseline mental status is AAOx2, often confused and doesn't know the time. Pt has a history of alcohol use disorder and  does not know how much she drinks but says he often finds bottles of vodka in garbage cans. Pt was reportedly shakier than normal today and  asked her if she quit cold turkey to which she replied yes. This evening at 10:30pm, pt was lying in bed when she had an episode of shaking and rolled to the floor (mattress on ground) that lasted 2-3 minutes. No fecal or urinary incontinence. ROS otherwise negative for fever, chills, chest pain, sob, nausea, vomiting, diarrhea. Pt was somnolent after seizure and then presented to the ED where she had another seizure.  is unclear if she returned to baseline mental status before second seizure.     In the ED:  Initial vital signs: T: 98.5F, HR: 76, BP: 167/95, R: 16, SpO2: 98% on RA  ED course:   Labs: significant for hg 10.7, Na 127, , AG 24, HCO3 19  Imaging: CT head w/o mass effect or intracranial abnormality. CT C spine w/o fracture.   CXR: unremarkable  EKG: sinus tachycardia  Treatment: 2mg ativan, 1L NS, 1L NS+_D5  Consults: Critical care Pt is 56F PMH HTN, alcohol use disorder, recent history of seizure (noncompliant on keppra) presented with seizure that occurred 7/26 at 10:30pm. History obtained from pt  (Garret [phone]) and chart as pt is a poor historian. Pt has hx of 4 seizures prior to today for which she last presented to [facility] (last 01/2022). Per , pt started to see a Neurologist at [place] and was started on Keppra with poor followup. Per the , patient's mental status has deteriorated since her January admission, unable to attend to ADLs on her own. Does not eat unless  brings her food. Baseline mental status is AAOx2, often confused and doesn't know the time. Pt has a history of alcohol use disorder and panic attacks and  does not know how much she drinks but says he often finds bottles of vodka in garbage cans. Pt was reportedly shakier than normal today and  asked her if she quit cold turkey to which she replied yes. This evening at 10:30pm, pt was lying in bed with  when she had an episode of shaking and rolled to the floor (mattress on ground) that lasted 2-3 minutes. No fecal or urinary incontinence. ROS otherwise negative for fever, chills, chest pain, sob, nausea, vomiting, diarrhea. Pt was somnolent after seizure and then presented to the ED where she had another seizure.     In the ED:  Initial vital signs: T: 98.5F, HR: 76, BP: 167/95, R: 16, SpO2: 98% on RA  ED course:   Labs: significant for hg 10.7, Na 127, , AG 24, HCO3 19  Imaging: CT head w/o mass effect or intracranial abnormality. CT C spine w/o fracture.   CXR: unremarkable  EKG: sinus tachycardia  Treatment: 2mg ativan, 1L NS, 1L NS+_D5  Consults: Critical care

## 2022-07-27 NOTE — H&P ADULT - PROBLEM SELECTOR PROBLEM 7
Recommended dose for her age is 25 mg per day. Most of us get enough through our food so I don't usually recommend an extra supplement but it doesn't hurt to give the supplement especially if it L-ascorbic acid (most are).     I would be happy to care for Whitney!    Bertha Albarado, Pediatric Nurse Practitioner   [facility]     Nutrition, metabolism, and development symptoms

## 2022-07-27 NOTE — H&P ADULT - PROBLEM SELECTOR PLAN 2
Pt with recent seizure + witness seizure in ED noted to have generalized tonic clonic shaking w/ foaming at mouth. Lasted 1-2 minutes. Uncertain if history of seizure disorder. Per , no seizure prior to last 15 months. History of alcohol use disorder with daily alcohol use, but reports not drinking day of seizure. Seizure possibly alcohol withdrawal related vs. baseline seizure disorder.     - currently taking keppra unknown dose (noncompliance)   - f/u keppra level  - c/w Keppra 500mg IV BID  - CIWA protocol as per alcohol withdrawal discussed above Pt with recent seizure + witness seizure in ED noted to have generalized tonic clonic shaking w/ foaming at mouth. Lasted 1-2 minutes. Uncertain if history of seizure disorder. Per , no seizure prior to last 15 months. History of alcohol use disorder with daily alcohol use, but reports not drinking day of seizure. Seizure possibly alcohol withdrawal related vs. baseline seizure disorder.     - currently taking keppra unknown dose (noncompliance)   - f/u keppra level  - c/w Keppra 500mg IV BID  - f/u vEEG   - CIWA protocol as per alcohol withdrawal discussed above  - Epilepsy consulted, f/u recs Pt with recent seizure + witness seizure in ED noted to have generalized tonic clonic shaking w/ foaming at mouth. Lasted 1-2 minutes. Uncertain if history of seizure disorder. Per , no seizure prior to last 15 months. History of alcohol use disorder with daily alcohol use, but reports not drinking day of seizure. Seizure possibly alcohol withdrawal related vs. baseline seizure disorder.     - currently taking Keppra 750 mg ER (likely noncompliant)  - f/u keppra level  - c/w Keppra 500mg IV BID  - f/u vEEG   - CIWA protocol as per alcohol withdrawal discussed above  - Epilepsy consulted, f/u recs Pt with recent seizure + witness seizure in ED noted to have generalized tonic clonic shaking w/ foaming at mouth. Lasted 1-2 minutes. Uncertain if history of seizure disorder. Per , no seizure prior to last 15 months. History of alcohol use disorder with daily alcohol use, but reports not drinking day of seizure. Seizure possibly alcohol withdrawal related vs. baseline seizure disorder.     - home med: Keppra 750 mg ER (likely noncompliant)  - f/u keppra level  - c/w Keppra 500mg IV BID, f/u epilepsy recs  - f/u vEEG   - CIWA protocol as per alcohol withdrawal discussed above  - Epilepsy consulted, f/u recs

## 2022-07-27 NOTE — H&P ADULT - ATTENDING COMMENTS
Briefly, 57yo woman w/ PMH HTN, EtOH use disorder, unspecified seizure disorder (on Keppra) who presents after seizure-like activity was witnessed last night by  while the two were in bed, followed by another witnessed GTC seizure after arrival to the ED. Questionable compliance with home Keppra per  and admitted by patient ("I miss a few doses here and there") --  thinks patient did not take for past two days. Component of EtOH withdrawal also a possibility that may have exacerbated AED non-compliance as patient said she decides to stop drinking cold turkey when she wants to "cut back" but cannot give specifics on last drink or overall amount she consumes. Mild-moderate hyponatremia noted on initial labs that must also be considered in the setting of seizure though would expect more severe hyponatremia causing seizure. Less likely infectious related etiologies by history and initial evaluation.     #Seizures  #EtOH withdrawal  #Hyponatremia  #IAGMA / starvation ketosis  #Hypokalemia  #Moderate protein monae malnutrition    - vEEG monitoring with epilepsy consultation today; continue empiric keppra 500mg IV BID while awaiting epilepsy recommendations and maintain seizure precautions at all times  - EtOH level checked and negative on arrival; continue CIWA monitoring protocol for e/o EtOH withdrawal and treat accordingly  - Trending metabolic panel in the setting of multiple electrolyte derangements - workup hyponatremia after volume resuscitation with urine studies and serum osmolality and goal 6-8mEq/24h Na correction in the event of chronic hyponatremia  - Potassium repletion, check phos; rest as above  - Nutrition eval given signs of malnutrition and sarcopenia on exam

## 2022-07-27 NOTE — CONSULT NOTE ADULT - SUBJECTIVE AND OBJECTIVE BOX
HPI  57 y/o Female with PMH HTN, AUD, and recent seizure disorder (on Keppra) presents with seizure that occurred at 10:30pm followed by another seizure that occurred in the ED. Patient is poor historian and states that she cannot remember the seizures. Patient endorses drinking 1-2 glasses of wine per night, and only will drink "hard alcohol" when her and her partner go out. She does state that the seizures seem to occur during the night or morning after she has been drinking. Patient also endorses being unbalanced while walking. She denies dizziness.    Per chart: Pt has hx of 4 seizures prior to this visit for which she last presented to [facility] (last 01/2022). Per , pt started to see a Neurologist at [place] and was started on Keppra with poor followup. Per the , patient's mental status has deteriorated since her January admission, unable to attend to ADLs on her own. Does not eat unless  brings her food. Baseline mental status is AAOx2, often confused and doesn't know the time. Pt has a history of alcohol use disorder and panic attacks and  does not know how much she drinks but says he often finds bottles of vodka in garbage cans. Pt was reportedly shakier than normal today and  asked her if she quit cold turkey to which she replied yes. At 10:30pm, pt was lying in bed with  when she had an episode of shaking and rolled to the floor (mattress on ground) that lasted 2-3 minutes. No fecal or urinary incontinence. ROS otherwise negative for fever, chills, chest pain, sob, nausea, vomiting, diarrhea. Pt was somnolent after seizure and then presented to the ED where she had another seizure.     Epilepsy risk factors:  Head injury with subsequent LOC?: patient states that this is possible but she does not recall  Febrile seizures in infancy?: patient denies  Hx of CNS infection?: patient denies  Family hx of epilepsy?: patient denies  Known CNS pathology?: patient denies     Prior AEDs      Prior imaging     Prior EEGs     Other diagnostic work-up     Review of Systems:  CONSTITUTIONAL:  No weight loss, fever, chills, weakness or fatigue.  HEENT:  Eyes:  No visual loss, blurred vision, double vision or yellow sclerae. Ears, Nose, Throat:  No hearing loss, sneezing, congestion, runny nose or sore throat.  SKIN:  No rash or itching.  CARDIOVASCULAR:  No chest pain, chest pressure or chest discomfort. No palpitations or edema.  RESPIRATORY:  No shortness of breath, cough or sputum.  GASTROINTESTINAL:  No anorexia, nausea, vomiting or diarrhea. No abdominal pain or blood.  GENITOURINARY: No Burning on urination.   NEUROLOGICAL:  see HPI  MUSCULOSKELETAL:  No muscle, back pain, joint pain or stiffness.  HEMATOLOGIC:  No anemia, bleeding or bruising.  LYMPHATICS:  No enlarged nodes. No history of splenectomy.  PSYCHIATRIC:  No history of depression or anxiety.  ENDOCRINOLOGIC:  No reports of sweating, cold or heat intolerance. No polyuria or polydipsia.  ALLERGIES:  No history of asthma, hives, eczema or rhinitis.       PAST MEDICAL & SURGICAL HISTORY:  HTN (hypertension)      Epilepsy      No significant past surgical history           FAMILY HISTORY:  No pertinent family history in first degree relatives         Social History:  Alcohol: per patient, 1-2 glasses of wine per night and occasional use of "hard alcohol"; per , not sure how much but finds bottles of vodka in garbage cans  Illicits: none  smoking: per patient, does not smoke; per chart, 2 ppd per   Driving:  Occupation: retired      Allergies  Endorses allergy to peppers and mushrooms  No other known drug, environmental, or food allergies      MEDICATIONS  (STANDING):  enoxaparin Injectable 40 milliGRAM(s) SubCutaneous every 24 hours  levETIRAcetam  IVPB 500 milliGRAM(s) IV Intermittent every 12 hours  potassium chloride   Powder 40 milliEquivalent(s) Oral every 4 hours  thiamine IVPB 500 milliGRAM(s) IV Intermittent daily    MEDICATIONS  (PRN):  acetaminophen     Tablet .. 650 milliGRAM(s) Oral every 6 hours PRN Temp greater or equal to 38C (100.4F), Mild Pain (1 - 3)  diazepam  Injectable 5 milliGRAM(s) IV Push every 1 hour PRN CIWA score >8       T(C): 37.2 (07-27-22 @ 08:59), Max: 37.2 (07-27-22 @ 08:59)  HR: 95 (07-27-22 @ 08:59) (62 - 95)  BP: 118/76 (07-27-22 @ 08:59) (118/76 - 167/95)  RR: 16 (07-27-22 @ 08:59) (16 - 16)  SpO2: 95% (07-27-22 @ 08:59) (95% - 98%)  Wt(kg): --    General:  Constitutional:  Laying comfortably in hospital bed in no apparent distress  Psychiatric: calm, normal affect, no overt anxiety or internal preoccupation  Ears, Nose, Throat: no abnormalities, mucus membranes moist  Neck: supple, no lymphadenopathy or nodules palpable  Cardiovascular: regular rate and rhythm, normal S1/S2, no murmurs   Chest: Clear to auscultation bilaterally  Abdomen: soft, non-tender, no hepatosplenomegaly   Extremities: no edema, clubbing or cyanosis  Skin: no rash or neurocutaneous signs     Cognitive:  Orientation, language, memory and knowledge screens intact.  Registration: 	3/3		WORLD: unable to accurately spell backwards		Recall 1/3    Cranial Nerves:  II: Full to confrontation. III/IV/VI: PERRL EOMI No nystagmus  V1V2V3: Symmetric, VII: Face appears symmetric VIII: Normal to screening, IX/X: Palate Elevates Symmetrical  XI: Trapezius Symmetric, 5/5 strength in shoulder shrug and neck turn  XII: Tongue midline  Motor: >4/5 throughout b/l, tone: normal x 4 limbs, no tremor   Sensation: Intact to light touch and symmetric throughout  Coordination: Finger-nose-finger intact, heel-shin intact, no dysmetria  Reflexes:  DTR: 2+ symmetric UE b/l; not elicited in the LE b/l  Plantar responses: Down bilaterally         Labs  CBC Full  -  ( 27 Jul 2022 12:34 )  WBC Count : 6.45 K/uL  RBC Count : 3.43 M/uL  Hemoglobin : 10.0 g/dL  Hematocrit : 31.0 %  Platelet Count - Automated : 222 K/uL  Mean Cell Volume : 90.4 fl  Mean Cell Hemoglobin : 29.2 pg  Mean Cell Hemoglobin Concentration : 32.3 gm/dL  Auto Neutrophil # : 4.81 K/uL  Auto Lymphocyte # : 0.55 K/uL  Auto Monocyte # : 1.03 K/uL  Auto Eosinophil # : 0.01 K/uL  Auto Basophil # : 0.03 K/uL  Auto Neutrophil % : 74.5 %  Auto Lymphocyte % : 8.5 %  Auto Monocyte % : 16.0 %  Auto Eosinophil % : 0.2 %  Auto Basophil % : 0.5 %    07-27    136  |  100  |  3<L>  ----------------------------<  124<H>  3.2<L>   |  24  |  0.34<L>    Ca    8.7      27 Jul 2022 12:34  Phos  2.4     07-27  Mg     2.4     07-27    TPro  7.8  /  Alb  4.6  /  TBili  0.4  /  DBili  x   /  AST  55<H>  /  ALT  26  /  AlkPhos  111  07-27    LIVER FUNCTIONS - ( 27 Jul 2022 01:45 )  Alb: 4.6 g/dL / Pro: 7.8 g/dL / ALK PHOS: 111 U/L / ALT: 26 U/L / AST: 55 U/L / GGT: x                 EEG: HPI  55 y/o Female with PMH HTN, AUD, and recent seizure disorder (on Keppra) presents with seizure that occurred at 10:30pm followed by another seizure that occurred in the ED. Patient is poor historian and states that she cannot remember the seizures. Patient endorses drinking 1-2 glasses of wine per night, and only will drink "hard alcohol" when her and her partner go out. She does state that the seizures seem to occur during the night or morning after she has been drinking. Patient also endorses being unbalanced while walking. She denies dizziness. She reports recent fall in the last week and states that she ended up at [facility]. She is not sure if she went to [facility] or her PCP office. She states that she is not sure whether or not she hit her head. She does not recall how she got to [facility]. She states that she was not drinking at the time and does not usually drink during the day. Pt also endorses nausea and vomiting yesterday before the seizure. She denies N/V currently.    Per chart: Pt has hx of 4 seizures prior to this visit for which she last presented to [facility] (last 01/2022). Per , pt started to see a Neurologist at [place] and was started on Keppra with poor followup. Per the , patient's mental status has deteriorated since her January admission, unable to attend to ADLs on her own. Does not eat unless  brings her food. Baseline mental status is AAOx2, often confused and doesn't know the time. Pt has a history of alcohol use disorder and panic attacks and  does not know how much she drinks but says he often finds bottles of vodka in garbage cans. Pt was reportedly shakier than normal today and  asked her if she quit cold turkey to which she replied yes. At 10:30pm, pt was lying in bed with  when she had an episode of shaking and rolled to the floor (mattress on ground) that lasted 2-3 minutes. No fecal or urinary incontinence. ROS otherwise negative for fever, chills, chest pain, sob, nausea, vomiting, diarrhea. Pt was somnolent after seizure and then presented to the ED where she had another seizure.     Epilepsy risk factors:  Head injury with subsequent LOC?: patient states that this is possible but she does not recall  Febrile seizures in infancy?: patient denies  Hx of CNS infection?: patient denies  Family hx of epilepsy?: patient denies  Known CNS pathology?: patient denies     Prior AEDs      Prior imaging     Prior EEGs     Other diagnostic work-up     Review of Systems:  CONSTITUTIONAL:  No weight loss, fever, chills, weakness or fatigue.  HEENT:  Eyes:  No visual loss, blurred vision, double vision or yellow sclerae. Ears, Nose, Throat:  No hearing loss, sneezing, congestion, runny nose or sore throat.  SKIN:  No rash or itching.  CARDIOVASCULAR:  No chest pain, chest pressure or chest discomfort. No palpitations or edema.  RESPIRATORY:  No shortness of breath, cough or sputum.  GASTROINTESTINAL:  No anorexia, nausea, vomiting or diarrhea. No abdominal pain or blood.  GENITOURINARY: No Burning on urination.   NEUROLOGICAL:  see HPI  MUSCULOSKELETAL:  No muscle, back pain, joint pain or stiffness.  HEMATOLOGIC:  No anemia, bleeding or bruising.  LYMPHATICS:  No enlarged nodes. No history of splenectomy.  PSYCHIATRIC:  No history of depression or anxiety.  ENDOCRINOLOGIC:  No reports of sweating, cold or heat intolerance. No polyuria or polydipsia.  ALLERGIES:  No history of asthma, hives, eczema or rhinitis.       PAST MEDICAL & SURGICAL HISTORY:  HTN (hypertension)      Epilepsy      No significant past surgical history           FAMILY HISTORY:  No pertinent family history in first degree relatives         Social History:  Alcohol: per patient, 1-2 glasses of wine per night and occasional use of "hard alcohol"; per , not sure how much but finds bottles of vodka in garbage cans  Illicits: none  smoking: per patient, does not smoke; per chart, 2 ppd per   Driving:  Occupation: retired      Allergies  Endorses allergy to peppers and mushrooms  No other known drug, environmental, or food allergies      MEDICATIONS  (STANDING):  enoxaparin Injectable 40 milliGRAM(s) SubCutaneous every 24 hours  levETIRAcetam  IVPB 500 milliGRAM(s) IV Intermittent every 12 hours  potassium chloride   Powder 40 milliEquivalent(s) Oral every 4 hours  thiamine IVPB 500 milliGRAM(s) IV Intermittent daily    MEDICATIONS  (PRN):  acetaminophen     Tablet .. 650 milliGRAM(s) Oral every 6 hours PRN Temp greater or equal to 38C (100.4F), Mild Pain (1 - 3)  diazepam  Injectable 5 milliGRAM(s) IV Push every 1 hour PRN CIWA score >8       T(C): 37.2 (07-27-22 @ 08:59), Max: 37.2 (07-27-22 @ 08:59)  HR: 95 (07-27-22 @ 08:59) (62 - 95)  BP: 118/76 (07-27-22 @ 08:59) (118/76 - 167/95)  RR: 16 (07-27-22 @ 08:59) (16 - 16)  SpO2: 95% (07-27-22 @ 08:59) (95% - 98%)  Wt(kg): --    General:  Constitutional:  Laying comfortably in hospital bed in no apparent distress  Psychiatric: calm, normal affect, no overt anxiety or internal preoccupation  Ears, Nose, Throat: no abnormalities, mucus membranes moist  Neck: supple, no lymphadenopathy or nodules palpable  Cardiovascular: regular rate and rhythm, normal S1/S2, no murmurs   Chest: Clear to auscultation bilaterally  Abdomen: soft, non-tender, no hepatosplenomegaly   Extremities: no edema, clubbing or cyanosis  Skin: no rash or neurocutaneous signs     Cognitive:  Orientation, language, memory and knowledge screens intact.  Registration: 	3/3		WORLD: unable to accurately spell backwards		Recall 1/3    Cranial Nerves:  II: Full to confrontation. III/IV/VI: PERRL EOMI No nystagmus  V1V2V3: Symmetric, VII: Face appears symmetric VIII: Normal to screening, IX/X: Palate Elevates Symmetrical  XI: Trapezius Symmetric, 5/5 strength in shoulder shrug and neck turn  XII: Tongue midline  Motor: >4/5 throughout b/l, tone: normal x 4 limbs, no tremor   Sensation: Intact to light touch and symmetric throughout  Coordination: Finger-nose-finger intact, heel-shin intact, no dysmetria  Reflexes:  DTR: 2+ symmetric UE b/l; not elicited in the LE b/l  Plantar responses: Down bilaterally         Labs  CBC Full  -  ( 27 Jul 2022 12:34 )  WBC Count : 6.45 K/uL  RBC Count : 3.43 M/uL  Hemoglobin : 10.0 g/dL  Hematocrit : 31.0 %  Platelet Count - Automated : 222 K/uL  Mean Cell Volume : 90.4 fl  Mean Cell Hemoglobin : 29.2 pg  Mean Cell Hemoglobin Concentration : 32.3 gm/dL  Auto Neutrophil # : 4.81 K/uL  Auto Lymphocyte # : 0.55 K/uL  Auto Monocyte # : 1.03 K/uL  Auto Eosinophil # : 0.01 K/uL  Auto Basophil # : 0.03 K/uL  Auto Neutrophil % : 74.5 %  Auto Lymphocyte % : 8.5 %  Auto Monocyte % : 16.0 %  Auto Eosinophil % : 0.2 %  Auto Basophil % : 0.5 %    07-27    136  |  100  |  3<L>  ----------------------------<  124<H>  3.2<L>   |  24  |  0.34<L>    Ca    8.7      27 Jul 2022 12:34  Phos  2.4     07-27  Mg     2.4     07-27    TPro  7.8  /  Alb  4.6  /  TBili  0.4  /  DBili  x   /  AST  55<H>  /  ALT  26  /  AlkPhos  111  07-27    LIVER FUNCTIONS - ( 27 Jul 2022 01:45 )  Alb: 4.6 g/dL / Pro: 7.8 g/dL / ALK PHOS: 111 U/L / ALT: 26 U/L / AST: 55 U/L / GGT: x                 EEG: EPILEPSY CONSULT NOTE:  HPI  57 y/o Female with PMH HTN, alcohol use disorder, and possible seizures (on Keppra) presents with seizure that occurred at 10:30pm on 7/26/22 followed by another seizure that occurred in the ED. Patient is a poor historian and states that she cannot remember the seizures. Patient endorses drinking 1-2 glasses of wine per night, and only will drink "hard alcohol" when her and her partner go out. She does state that the seizures seem to occur during the night or morning after she has been drinking. Patient also endorses being unbalanced while walking. She denies dizziness. She reports recent fall in the last week and states that she ended up at [facility]. She is not sure if she went to [facility] or her PCP office. She states that she is not sure whether or not she hit her head. She does not recall how she got to [facility]. She states that she was not drinking at the time and does not usually drink during the day. Pt also endorses nausea and vomiting yesterday before the seizure. She denies N/V currently.    Per chart: Pt has hx of 4 seizures prior to this visit for which she last presented to [facility] (last 01/2022). Per , pt started to see a Neurologist at [place] and was started on Keppra with poor followup. Per the , patient's mental status has deteriorated since her January admission, unable to attend to ADLs on her own. Does not eat unless  brings her food. Baseline mental status is AAOx2, often confused and doesn't know the time. Pt has a history of alcohol use disorder and panic attacks and  does not know how much she drinks but says he often finds bottles of vodka in garbage cans. Pt was reportedly shakier than normal today and  asked her if she quit cold turkey to which she replied yes. At 10:30pm, pt was lying in bed with  when she had an episode of shaking and rolled to the floor (mattress on ground) that lasted 2-3 minutes. No fecal or urinary incontinence. ROS otherwise negative for fever, chills, chest pain, sob, nausea, vomiting, diarrhea. Pt was somnolent after seizure and then presented to the ED where she had another seizure.     Epilepsy risk factors:  Head injury with subsequent LOC?: patient states that this is possible but she does not recall  Febrile seizures in infancy?: patient denies  Hx of CNS infection?: patient denies  Family hx of epilepsy?: patient denies  Known CNS pathology?: patient denies     Review of Systems:  CONSTITUTIONAL:  No weight loss, fever, chills, weakness or fatigue.  HEENT:  Eyes:  No visual loss, blurred vision, double vision or yellow sclerae. Ears, Nose, Throat:  No hearing loss, sneezing, congestion, runny nose or sore throat.  SKIN:  No rash or itching.  CARDIOVASCULAR:  No chest pain, chest pressure or chest discomfort. No palpitations or edema.  RESPIRATORY:  No shortness of breath, cough or sputum.  GASTROINTESTINAL: +Nausea and vomiting prior to event  GENITOURINARY: No Burning on urination.   NEUROLOGICAL:  see HPI  MUSCULOSKELETAL:  No muscle, back pain, joint pain or stiffness.  HEMATOLOGIC:  No anemia, bleeding or bruising.  LYMPHATICS:  No enlarged nodes. No history of splenectomy.  PSYCHIATRIC:  No history of depression or anxiety.  ENDOCRINOLOGIC:  No reports of sweating, cold or heat intolerance. No polyuria or polydipsia.  ALLERGIES:  No history of asthma, hives, eczema or rhinitis.     PAST MEDICAL & SURGICAL HISTORY:  HTN (hypertension)  Seizure-like activity    No significant past surgical history    FAMILY HISTORY:  No pertinent family history in first degree relatives     Social History:  Alcohol: per patient, 1-2 glasses of wine per night and occasional use of "hard alcohol"; per , not sure how much but finds bottles of vodka in garbage cans  Illicits: none  smoking: per patient, does not smoke; per chart, 2 ppd per   Occupation: retired      Allergies  Endorses allergy to peppers and mushrooms  No other known drug, environmental, or food allergies    MEDICATIONS  (STANDING):  enoxaparin Injectable 40 milliGRAM(s) SubCutaneous every 24 hours  levETIRAcetam  IVPB 500 milliGRAM(s) IV Intermittent every 12 hours  potassium chloride   Powder 40 milliEquivalent(s) Oral every 4 hours  thiamine IVPB 500 milliGRAM(s) IV Intermittent daily    MEDICATIONS  (PRN):  acetaminophen     Tablet .. 650 milliGRAM(s) Oral every 6 hours PRN Temp greater or equal to 38C (100.4F), Mild Pain (1 - 3)  diazepam  Injectable 5 milliGRAM(s) IV Push every 1 hour PRN CIWA score >8    VITAL SIGNS:   T(C): 37.2 (07-27-22 @ 08:59), Max: 37.2 (07-27-22 @ 08:59)  HR: 95 (07-27-22 @ 08:59) (62 - 95)  BP: 118/76 (07-27-22 @ 08:59) (118/76 - 167/95)  RR: 16 (07-27-22 @ 08:59) (16 - 16)  SpO2: 95% (07-27-22 @ 08:59) (95% - 98%)  Wt(kg): --    PHYSICAL EXAM:  Constitutional:  Laying comfortably in hospital bed in no apparent distress  Psychiatric: calm, normal affect, no overt anxiety or internal preoccupation  Ears, Nose, Throat: no abnormalities, mucus membranes moist  Neck: supple, no lymphadenopathy or nodules palpable  Cardiovascular: regular rate and rhythm, normal S1/S2, no murmurs   Chest: Clear to auscultation bilaterally  Abdomen: soft, non-tender, no hepatosplenomegaly   Extremities: Thin or malnourished appearing limbs   Skin: no rash or neurocutaneous signs     Cognitive:  Alert and oriented to name, place date. Speech fluent w/ naming (3 objects), and repetition intact w/o aphasia or dysarthria. Registration: 3/3 WORLD: unable to accurately spell backwards. Recall 1/3. Follow commands.     Cranial Nerves:  II: Full to confrontation. III/IV/VI: PERRLA EOMI No nystagmus  V1V2V3: Symmetric, VII: Face appears symmetric VIII: Normal to screening, IX/X: Palate Elevates Symmetrical  XI: Trapezius Symmetric, 5/5 strength in shoulder shrug and neck turn  XII: Tongue midline  Motor: effort-based strength ~4/5 throughout b/l, tone: normal x 4 limbs, no tremor   Sensation: Intact to light touch and symmetric throughout  Coordination: Finger-nose-finger intact, heel-shin intact, no dysmetria  Reflexes:  DTR: 2+ symmetric UE b/l; not elicited in the LE b/l  Plantar responses: Down bilaterally  Gait: Observed ambulating to bathroom w/ supervision    Labs  CBC Full  -  ( 27 Jul 2022 12:34 )  WBC Count : 6.45 K/uL  RBC Count : 3.43 M/uL  Hemoglobin : 10.0 g/dL  Hematocrit : 31.0 %  Platelet Count - Automated : 222 K/uL  Mean Cell Volume : 90.4 fl  Mean Cell Hemoglobin : 29.2 pg  Mean Cell Hemoglobin Concentration : 32.3 gm/dL  Auto Neutrophil # : 4.81 K/uL  Auto Lymphocyte # : 0.55 K/uL  Auto Monocyte # : 1.03 K/uL  Auto Eosinophil # : 0.01 K/uL  Auto Basophil # : 0.03 K/uL  Auto Neutrophil % : 74.5 %  Auto Lymphocyte % : 8.5 %  Auto Monocyte % : 16.0 %  Auto Eosinophil % : 0.2 %  Auto Basophil % : 0.5 %    07-27    136  |  100  |  3<L>  ----------------------------<  124<H>  3.2<L>   |  24  |  0.34<L>    Ca    8.7      27 Jul 2022 12:34  Phos  2.4     07-27  Mg     2.4     07-27    TPro  7.8  /  Alb  4.6  /  TBili  0.4  /  DBili  x   /  AST  55<H>  /  ALT  26  /  AlkPhos  111  07-27    LIVER FUNCTIONS - ( 27 Jul 2022 01:45 )  Alb: 4.6 g/dL / Pro: 7.8 g/dL / ALK PHOS: 111 U/L / ALT: 26 U/L / AST: 55 U/L / GGT: x             < from: CT Head No Cont (07.27.22 @ 03:26) >    IMPRESSION:    No acute intracranial abnormality or mass effect on this motion degraded   examination.    --- End of Report ---    < end of copied text >

## 2022-07-27 NOTE — H&P ADULT - NSICDXFAMHXNEG_GEN_ALL
congestive heart failure/coronary disease/dementia/stroke/thyroid disease asthma/congestive heart failure/coronary disease/dementia/stroke

## 2022-07-27 NOTE — ED PROVIDER NOTE - CLINICAL SUMMARY MEDICAL DECISION MAKING FREE TEXT BOX
56F PMH HTN, seizures (on keppra, unknown dose) p/w concern for seizure. Hx obtained from  aat bedside. States that while she was in bed she had ~2min episode of generalized shaking/unresponsive, followed by confusion. States that she drinks etoh daily, but possibly did not have alcohol today.   Denies known trauma, vomiting, fevers, URI symptoms.  Hypertensive, other vitals wnl. Exam as above.  Pt initially talking to triage RN. I was called to eval pt after pt noted to have generalized tonic clonic shaking in stretcher. +foaming at mouth. Lasted 1-2min then resolved, was still confused initially.   ddx: Likely seizure - possibly etoh related vs. breakthrough seizures from baseline epilepsy.   Labs, CTs, IVF, symptom control, reassess.

## 2022-07-27 NOTE — CONSULT NOTE ADULT - ASSESSMENT
57 y/o female with PMH of HTN, AUD, and a recent history of 4 seizures prompting hospitalization in January 2022 on Keppra with poor medication adherence, presenting with a seizure that occurred on 7/26 at 10:30pm and another seizure that occurred in the ED. Patient CIWA score 0. Due to patient report of seizures occurring after alcohol consider alcohol withdrawal-related seizure.    Recommendations:  -continue vEEG monitoring  -awaiting Keppra level  - 55 y/o female with PMH of HTN, alcohol use disorder, and recent seizure-like activities (1/2022) who was admitted on 7/27/22 for a seizure like event and another episode in ED. Unclear if seizure events are provoked by ETOH withdrawal vs seizures given CTH w/ no acute pathology, personal hx of events related to ETOH use, and no identifiable seizures.     Recommendations:  -continue vEEG monitoring  - Pending Keppra level  - Increase Keppra 500mg Q12hrs to home dose 750mg ER QD  - Maintain seizure fall and seizure precautions

## 2022-07-27 NOTE — H&P ADULT - NSHPSOCIALHISTORY_GEN_ALL_CORE
EtOH use ..... Etoh use - unable to determine quantity  Current smoker - 2 ppd per , denies other drug or alcohol use  Retired , lives with

## 2022-07-27 NOTE — H&P ADULT - PROBLEM SELECTOR PLAN 4
AG 24 w/ bicarb 19 on admission likely 2/2 lactate from seizure activity vs. starvation ketosis. No osmolar gap. Elevated BHB and normal lactate.  Calcium oxalate crystals on UA concerning for toxic ingestion.     - f/u repeat BMP and UA  - monitor Is and Os Na 127 on admission with decreased serum osmolality. Likely poor PO intake in setting of alcohol use. AAOx3    - f/u repeat BMP Na 127 on admission with decreased serum osmolality. Likely poor PO intake in setting of alcohol use. AAOx3    - f/u urine studies  - f/u repeat BMP

## 2022-07-27 NOTE — H&P ADULT - PROBLEM SELECTOR PLAN 6
Hg 10.7 at admission w/ normal MCV.     - obtain iron studies and folate Hx of HTN. /95 on admission. Currently Normotensive.  Home meds: clonidine .1mg BID, hydralazine 25mg QID with likely poor adherence.    - continue to monitor BPs  - if hypertensive, can consider restarting home hydralazine Hx of HTN. /95 on admission. Currently Normotensive.  Home meds: clonidine PO 1 mg BID, hydralazine PO 25mg BID with poor adherence.    - continue to monitor BPs  - if hypertensive, can consider restarting home hydralazine Hx of HTN. /95 on admission. Currently Normotensive.  Home meds: clonidine PO 1 mg BID, hydralazine PO 25mg BID with poor adherence.    - continue to monitor BPs

## 2022-07-27 NOTE — H&P ADULT - PROBLEM SELECTOR PLAN 5
Na 127 on admission with decreased serum osmolality. Likely poor PO intake in setting of alcohol use. AAOx3    - f/u repeat BMP Hg 10.7 at admission w/ normal MCV.     - obtain iron studies and folate Hg 10.7 at admission w/ normal MCV. Likely in setting in decreased PO intake.     - f/u iron studies and folate

## 2022-07-27 NOTE — CONSULT NOTE ADULT - ASSESSMENT
56F PMH HTN, alcohol use disorder, ?seizure disorder (on keppra) presented with seizure that occurred 7/26 at 10:30pm c/b additional seizure in the ED.      Neuro  #seizures  - pt with questionable history of seizure disorder but no history prior to the past 15 months per pt's   - on keppra with poor adherence  - presented with seizure in the setting of not drinking alcohol c/b additional seizure in the ED  Plan:  - likely related to alcohol withdrawal vs withdrawal putting body under stress and causing seizure in predisposed individual  - difficult to assess CIWA in ED as pt somnolent from ativan  - CIWA protocol  - vEEG monitoring  - continue keppra 500mg BID (clarify dose in AM)    Pulm  - BASHIR on room air    Cards  #HTN  - home meds: clonidine .1mg BID, hydralazine 25mg QID as of 09/2021  Plan:  - likely questionable adherence  - med rec in AM  - if HTNive, can restart hydralazine    GI  - NPO for seizure precautions    Renal  #AGMA  - AG 24 with bicarb 19 in ED  - likely 2/2 lactate from seizures vs starvation ketosis  Plan:  - f/u lactate, BHB  - can consider dextrose after thiamine administration    Psych  #alcohol use disorder  -  does not know how much she drinks but says he often finds bottles of vodka in garbage cans  - likely last drink 7/25  Plan:  - [facility] protocol  - thiamine, folate, MV  - SBIRT    #anxiety/panic disorder  - SW referral in AM    F: dextrose pending thiamine and BHB  E: replete prn  N: NPO for seizure precautions  DVT ppx: lovenox  Code status: full code  Dispo: pending discussion with attending 56F PMH HTN, alcohol use disorder, ?seizure disorder (on keppra) presented with seizure that occurred 7/26 at 10:30pm c/b additional seizure in the ED.      Neuro  #seizures  - pt with questionable history of seizure disorder but no history prior to the past 15 months per pt's   - on keppra with poor adherence  - presented with seizure in the setting of not drinking alcohol c/b additional seizure in the ED  Plan:  - likely related to alcohol withdrawal vs withdrawal putting body under stress and causing seizure in predisposed individual  - difficult to assess CIWA in ED as pt somnolent from ativan  - CIWA protocol  - vEEG monitoring  - continue keppra 500mg BID (clarify dose in AM)    Pulm  - BASHIR on room air    Cards  #HTN  - home meds: clonidine .1mg BID, hydralazine 25mg QID as of 09/2021  Plan:  - likely questionable adherence  - med rec in AM  - if HTNive, can restart hydralazine    GI  - NPO for seizure precautions    Renal  #AGMA  - AG 24 with bicarb 19 in ED  - likely 2/2 lactate from seizures vs starvation ketosis  Plan:  - f/u lactate, BHB  - can consider dextrose after thiamine administration  - f/u serum osm as calcium oxalate crystals in UA    Psych  #alcohol use disorder  -  does not know how much she drinks but says he often finds bottles of vodka in garbage cans  - likely last drink 7/25  Plan:  - CIWA protocol  - thiamine, folate, MV  - SBIRT  - f/u Mg, Phos    #anxiety/panic disorder  - likely a contributing factor in alchohol use disorder  - SBIRT     F: dextrose pending thiamine and BHB  E: replete prn  N: NPO for seizure precautions  DVT ppx: lovenox  Code status: full code  Dispo: pending discussion with attending 56F PMH HTN, alcohol use disorder, ?seizure disorder (on keppra) presented with seizure that occurred 7/26 at 10:30pm c/b additional seizure in the ED.      Neuro  #seizures  - pt with questionable history of seizure disorder but no history prior to the past 15 months per pt's   - on keppra with poor adherence  - presented with seizure in the setting of not drinking alcohol c/b additional seizure in the ED  Plan:  - likely related to alcohol withdrawal vs withdrawal putting body under stress and causing seizure in predisposed individual  - difficult to assess CIWA in ED as pt somnolent from ativan  - CIWA protocol  - vEEG monitoring  - continue keppra 500mg BID (clarify dose in AM)    Pulm  - BASHIR on room air    Cards  #HTN  - home meds: clonidine .1mg BID, hydralazine 25mg QID as of 09/2021  Plan:  - likely questionable adherence  - med rec in AM  - if HTNive, can restart hydralazine    GI  - NPO for seizure precautions    Renal  #AGMA  - AG 24 with bicarb 19 in ED  - likely 2/2 lactate from seizures vs starvation ketosis  Plan:  - f/u lactate, BHB  - can consider dextrose after thiamine administration  - f/u serum osm as calcium oxalate crystals in UA    Heme  #anemia  - Hb 10.7 on admission with normal MCV but high RDW  Plan:  - Fe studies b12, folate in AM    Psych  #alcohol use disorder  -  does not know how much she drinks but says he often finds bottles of vodka in garbage cans  - likely last drink 7/25  Plan:  - CIWA protocol  - thiamine, folate, MV  - SBIRT  - f/u Mg, Phos    #anxiety/panic disorder  - likely a contributing factor in alchohol use disorder  - SBIRT     F: dextrose pending thiamine and BHB  E: replete prn  N: NPO for seizure precautions  DVT ppx: lovenox  Code status: full code  Dispo: pending discussion with attending 56F PMH HTN, alcohol use disorder, ?seizure disorder (on keppra) presented with seizure that occurred 7/26 at 10:30pm c/b additional seizure in the ED.    Neuro  #metabolic encephalopathy  - per , pt's baseline is AAOx2 (difficulty remembering what time it is), conversive, but dependent on ADLs. when seen in ED, pt very somnolent  - likely ativan induced vs post ictal state  - CT head negative without FNDs on neuro exam  - no leukocytosis, fever, or signs of infection  Plan:  - add on ammonia level  - monitor    #seizures  - pt with questionable history of seizure disorder but no history prior to the past 15 months per pt's   - on keppra with poor adherence  - presented with seizure in the setting of not drinking alcohol c/b additional seizure in the ED  Plan:  - likely related to alcohol withdrawal vs withdrawal putting body under stress and causing seizure in predisposed individual  - difficult to assess CIWA in ED as pt somnolent from ativan  - CIWA protocol  - vEEG monitoring  - continue keppra 500mg BID (clarify dose in AM)    Pulm  - BASHIR on room air    Cards  #HTN  - home meds: clonidine .1mg BID, hydralazine 25mg QID as of 09/2021 with likely poor adherence  Plan:  - med rec in AM  - if HTNive, can restart hydralazine  - would avoid clonidine in a patient with poor adherence as it can cause rebound hypertension    GI  - NPO for seizure precautions    Renal  #AGMA  - AG 24 with bicarb 19 in ED  - likely 2/2 lactate from seizures vs starvation ketosis  - no osmolar gap between measure and calculated serum osm (however, were done 2 hours apart)  - lactate wnl  - BHB elevated  Plan:  - likely starvation ketosis. however, concern for toxic ingestion considering alcohol use disorder and calcium oxalate crystals on UA  - f/u repeat BMP, VBG, UA  - strict Is and Os  - consider renal consult if labs are worsening, decrease in UOP, or still concerned for toxic ingestion    #HypoNa  - Na 127 on admission  - serum osmolality decreased  - urine studies after fluid administration  Plan:  - likely in the setting of poor PO intake from alcohol use  - f/u repeat BMP    Heme  #anemia  - Hb 10.7 on admission with normal MCV but high RDW  Plan:  - Fe studies b12, folate in AM    Psych  #alcohol use disorder  -  does not know how much she drinks but says he often finds bottles of vodka in garbage cans  - likely last drink 7/25  Plan:  - CIWA protocol  - would start thiamine, folate, MV  - SBIRT  - f/u Mg, Phos    #anxiety/panic disorder  - likely a contributing factor in alchohol use disorder  - SBIRT     F: dextrose pending thiamine and BHB  E: replete prn  N: NPO for seizure precautions  DVT ppx: lovenox  Code status: full code  Dispo: [facility], please reconsult as needed

## 2022-07-27 NOTE — ED PROVIDER NOTE - PROGRESS NOTE DETAILS
[place]: Hgb 10.7, Na 127, Cl 84, bicarb 19, AG 24, , other labs grossly wnl. Pt still confused/sleepy but improved from prior. Urine/CTs/EKG pending. Vitals stable. Given likely seizures in setting of possible etoh withdrawal, MICU consulted. Updated pt/. Will reassess. [place]: ICU recommending regional admission. pt remains stable, will admit medicine for further care.

## 2022-07-27 NOTE — CONSULT NOTE ADULT - SUBJECTIVE AND OBJECTIVE BOX
[facility] SERVICE CONSULTATION NOTE    CC: seizure    HPI:  56F PMH HTN, alcohol use disorder, ?seizure disorder (on keppra) presented with seizure that occurred 7/26 at 10:30pm. History obtained from  as pt is poor historian. Pt has hx of 4 seizures prior to today for which she has presented to [facility] (last 01/2022). Pt has had poor follow up with an outpatient neurologist who put her on keppra. Baseline mental status is AAOx2, often confused and doesn't know the time. Pt has a history of alcohol use disorder and  does not know how much she drinks but says he often finds bottles of vodka in garbage cans. Pt was reportedly shakier than normal today and  asked her if she quit cold turkey to which she replied yes. This evening at 10:30pm, pt was lying in bed when she had an episode of shaking and rolled to the floor (mattress on ground) that lasted 2-3 minutes. No fecal or urinary incontinence. ROS otherwise negative for fever, chills, chest pain, sob, nausea, vomiting, diarrhea. Pt was somnolent after seizure and then presented to the ED where she had another seizure.  is unclear if she returned to baseline mental status before second seizure.     ED Course:  VS: T 98.5, HR 76, /95, O2 98%  Labs s/f: Hb 10.7, bicarb 19, AG 24  Treatment: 1L NS, 2mg ativan    ROS:  Otherwise negative, except as specified in HPI.    PMH: as above    PSH: as above    FH: noncontributory     SH: smokes 2 ppd. daily alcohol use as above. no other drug use. retired . doesn't take care of ADLs    ALLERGIES: NKDA    MEDICATIONS: see med rec    VITAL SIGNS:  ICU Vital Signs Last 24 Hrs  T(C): 36.9 (27 Jul 2022 01:10), Max: 36.9 (27 Jul 2022 01:10)  T(F): 98.5 (27 Jul 2022 01:10), Max: 98.5 (27 Jul 2022 01:10)  HR: 62 (27 Jul 2022 03:29) (62 - 76)  BP: 131/95 (27 Jul 2022 03:29) (131/95 - 167/95)  BP(mean): --  ABP: --  ABP(mean): --  RR: 16 (27 Jul 2022 03:29) (16 - 16)  SpO2: 98% (27 Jul 2022 03:29) (98% - 98%)    O2 Parameters below as of 27 Jul 2022 03:29  Patient On (Oxygen Delivery Method): room air          CAPILLARY BLOOD GLUCOSE          PHYSICAL EXAM:  Constitutional: somnolent in bed  HEENT: dry mucous membranes  Neck: supple  Respiratory: CTA B/L, no W/R/R; respirations appear non-labored  Cardiovascular: +S1/S2, RRR  Gastrointestinal: abdomen soft, NT/ND  Extremities: WWP; no clubbing, cyanosis or edema  Vascular: 2+ radial, DP pulses B/L  Dermatologic: skin normal color and turgor; no visible rashes  Neurological: AAOx2; responds to verbal stimuli but difficult to arouse and only answering some questions    LABS:                        10.7   8.69  )-----------( 238      ( 27 Jul 2022 01:45 )             33.3     07-27    127<L>  |  84<L>  |  4<L>  ----------------------------<  166<H>  3.5   |  19<L>  |  0.38<L>    Ca    9.0      27 Jul 2022 01:45    TPro  7.8  /  Alb  4.6  /  TBili  0.4  /  DBili  x   /  AST  55<H>  /  ALT  26  /  AlkPhos  111  07-27        CARDIAC MARKERS ( 27 Jul 2022 01:45 )  x     / x     / 269 U/L / x     / 7.1 ng/mL            EKG: Reviewed.    RADIOLOGY & ADDITIONAL TESTS: Reviewed. [facility] SERVICE CONSULTATION NOTE    CC: seizure    HPI:  56F PMH HTN, alcohol use disorder, ?seizure disorder (on keppra) presented with seizure that occurred 7/26 at 10:30pm. History obtained from  as pt is poor historian. Pt has hx of 4 seizures prior to today for which she has presented to [facility] (last 01/2022). Pt has had poor follow up with an outpatient neurologist who put her on keppra. Baseline mental status is AAOx2, often confused and doesn't know the time. Pt has a history of alcohol use disorder and  does not know how much she drinks but says he often finds bottles of vodka in garbage cans. Pt was reportedly shakier than normal today and  asked her if she quit cold turkey to which she replied yes. This evening at 10:30pm, pt was lying in bed when she had an episode of shaking and rolled to the floor (mattress on ground) that lasted 2-3 minutes. No fecal or urinary incontinence. ROS otherwise negative for fever, chills, chest pain, sob, nausea, vomiting, diarrhea. Pt was somnolent after seizure and then presented to the ED where she had another seizure.  is unclear if she returned to baseline mental status before second seizure.     ED Course:  VS: T 98.5, HR 76, /95, O2 98%  Labs s/f: Hb 10.7, bicarb 19, AG 24  Treatment: 1L NS, 2mg ativan    ROS:  Otherwise negative, except as specified in HPI.    PMH: as above    PSH: as above    FH: noncontributory     SH: smokes 2 ppd. daily alcohol use as above. no other drug use. retired . doesn't take care of ADLs    ALLERGIES: NKDA    MEDICATIONS: see med rec    VITAL SIGNS:  ICU Vital Signs Last 24 Hrs  T(C): 36.9 (27 Jul 2022 01:10), Max: 36.9 (27 Jul 2022 01:10)  T(F): 98.5 (27 Jul 2022 01:10), Max: 98.5 (27 Jul 2022 01:10)  HR: 62 (27 Jul 2022 03:29) (62 - 76)  BP: 131/95 (27 Jul 2022 03:29) (131/95 - 167/95)  BP(mean): --  ABP: --  ABP(mean): --  RR: 16 (27 Jul 2022 03:29) (16 - 16)  SpO2: 98% (27 Jul 2022 03:29) (98% - 98%)    O2 Parameters below as of 27 Jul 2022 03:29  Patient On (Oxygen Delivery Method): room air          CAPILLARY BLOOD GLUCOSE          PHYSICAL EXAM:  Constitutional: somnolent in bed  HEENT: dry mucous membranes  Neck: supple  Respiratory: CTA B/L, no W/R/R; respirations appear non-labored  Cardiovascular: +S1/S2, RRR  Gastrointestinal: abdomen soft, NT/ND  Extremities: WWP; no clubbing, cyanosis or edema  Vascular: 2+ radial, DP pulses B/L  Dermatologic: skin normal color and turgor; no visible rashes  Neurological: AAOx2; somnolent but responds to questions  LABS:                        10.7   8.69  )-----------( 238      ( 27 Jul 2022 01:45 )             33.3     07-27    127<L>  |  84<L>  |  4<L>  ----------------------------<  166<H>  3.5   |  19<L>  |  0.38<L>    Ca    9.0      27 Jul 2022 01:45    TPro  7.8  /  Alb  4.6  /  TBili  0.4  /  DBili  x   /  AST  55<H>  /  ALT  26  /  AlkPhos  111  07-27        CARDIAC MARKERS ( 27 Jul 2022 01:45 )  x     / x     / 269 U/L / x     / 7.1 ng/mL            EKG: Reviewed.    RADIOLOGY & ADDITIONAL TESTS: Reviewed.

## 2022-07-27 NOTE — PATIENT PROFILE ADULT - FALL HARM RISK - HARM RISK INTERVENTIONS
Assistance with ambulation/Assistance OOB with selected safe patient handling equipment/Communicate Risk of Fall with Harm to all staff/Discuss with provider need for PT consult/Monitor for mental status changes/Monitor gait and stability/Provide patient with walking aids - walker, cane, crutches/Reinforce activity limits and safety measures with patient and family/Tailored Fall Risk Interventions/Toileting schedule using arm’s reach rule for commode and bathroom/Use of alarms - bed, chair and/or voice tab/Visual Cue: Yellow wristband and red socks/Bed in lowest position, wheels locked, appropriate side rails in place/Call bell, personal items and telephone in reach/Instruct patient to call for assistance before getting out of bed or chair/Non-slip footwear when patient is out of bed/[place] to call system/Physically safe environment - no spills, clutter or unnecessary equipment/Purposeful Proactive Rounding/Room/bathroom lighting operational, light cord in reach

## 2022-07-27 NOTE — ED ADULT TRIAGE NOTE - BP NONINVASIVE DIASTOLIC (MM HG)
DETROL LA not covered by ins, needs to try and fail oxybutynin, myrebetriq, vesicare or toviaz     Prior Authorization Retail Medication Request    Medication/Dose: tolterodine (DETROL LA) 4 MG 24 hr capsule  ICD code (if different than what is on RX):  Mixed incontinence urge and stress (male)(female) [N39.46]  Previously Tried and Failed:    Rationale:      Insurance Name:  medicare  Insurance ID:  [id number]      Pharmacy Information (if different than what is on RX)  Name:  [facility]   Phone:  [phone]     95

## 2022-07-27 NOTE — H&P ADULT - NSHPLABSRESULTS_GEN_ALL_CORE
LABS:                         10.7   8.69  )-----------( 238      ( 2022 01:45 )             33.3         127<L>  |  84<L>  |  4<L>  ----------------------------<  166<H>  3.5   |  19<L>  |  0.38<L>    Ca    9.0      2022 01:45  Phos  2.9       Mg     1.5         TPro  7.8  /  Alb  4.6  /  TBili  0.4  /  DBili  x   /  AST  55<H>  /  ALT  26  /  AlkPhos  111        Urinalysis Basic - ( 2022 03:57 )    Color: Yellow / Appearance: Clear / S.010 / pH: x  Gluc: x / Ketone: 15 mg/dL  / Bili: Negative / Urobili: 0.2 E.U./dL   Blood: x / Protein: Trace mg/dL / Nitrite: NEGATIVE   Leuk Esterase: Moderate / RBC: < 5 /HPF / WBC < 5 /HPF   Sq Epi: x / Non Sq Epi: 0-5 /HPF / Bacteria: Present /HPF      CARDIAC MARKERS ( 2022 01:45 )  x     / x     / 269 U/L / x     / 7.1 ng/mL      CAPILLARY BLOOD GLUCOSE          Lactate, Blood: 1.3 mmol/L ( @ 04:12)      RADIOLOGY, EKG & ADDITIONAL TESTS:

## 2022-07-27 NOTE — ED ADULT NURSE NOTE - NS ED PATIENT SAFETY CONCERN
The patient is a 80y Male complaining of altered mental status.
Unable to assess due to medical condition

## 2022-07-28 LAB
ANION GAP SERPL CALC-SCNC: 11 MMOL/L — SIGNIFICANT CHANGE UP (ref 5–17)
BASOPHILS # BLD AUTO: 0.04 K/UL — SIGNIFICANT CHANGE UP (ref 0–0.2)
BASOPHILS NFR BLD AUTO: 0.8 % — SIGNIFICANT CHANGE UP (ref 0–2)
BUN SERPL-MCNC: 4 MG/DL — LOW (ref 7–23)
CALCIUM SERPL-MCNC: 9.4 MG/DL — SIGNIFICANT CHANGE UP (ref 8.4–10.5)
CHLORIDE SERPL-SCNC: 97 MMOL/L — SIGNIFICANT CHANGE UP (ref 96–108)
CO2 SERPL-SCNC: 24 MMOL/L — SIGNIFICANT CHANGE UP (ref 22–31)
CREAT SERPL-MCNC: 0.33 MG/DL — LOW (ref 0.5–1.3)
CULTURE RESULTS: SIGNIFICANT CHANGE UP
EGFR: 122 ML/MIN/1.73M2 — SIGNIFICANT CHANGE UP
EOSINOPHIL # BLD AUTO: 0.03 K/UL — SIGNIFICANT CHANGE UP (ref 0–0.5)
EOSINOPHIL NFR BLD AUTO: 0.6 % — SIGNIFICANT CHANGE UP (ref 0–6)
GLUCOSE SERPL-MCNC: 176 MG/DL — HIGH (ref 70–99)
HCT VFR BLD CALC: 32.2 % — LOW (ref 34.5–45)
HCV AB S/CO SERPL IA: 0.03 S/CO — SIGNIFICANT CHANGE UP
HCV AB SERPL-IMP: SIGNIFICANT CHANGE UP
HGB BLD-MCNC: 10.1 G/DL — LOW (ref 11.5–15.5)
IMM GRANULOCYTES NFR BLD AUTO: 0.4 % — SIGNIFICANT CHANGE UP (ref 0–1.5)
LYMPHOCYTES # BLD AUTO: 0.82 K/UL — LOW (ref 1–3.3)
LYMPHOCYTES # BLD AUTO: 15.5 % — SIGNIFICANT CHANGE UP (ref 13–44)
MAGNESIUM SERPL-MCNC: 1.8 MG/DL — SIGNIFICANT CHANGE UP (ref 1.6–2.6)
MCHC RBC-ENTMCNC: 28.9 PG — SIGNIFICANT CHANGE UP (ref 27–34)
MCHC RBC-ENTMCNC: 31.4 GM/DL — LOW (ref 32–36)
MCV RBC AUTO: 92.3 FL — SIGNIFICANT CHANGE UP (ref 80–100)
MONOCYTES # BLD AUTO: 0.73 K/UL — SIGNIFICANT CHANGE UP (ref 0–0.9)
MONOCYTES NFR BLD AUTO: 13.8 % — SIGNIFICANT CHANGE UP (ref 2–14)
NEUTROPHILS # BLD AUTO: 3.65 K/UL — SIGNIFICANT CHANGE UP (ref 1.8–7.4)
NEUTROPHILS NFR BLD AUTO: 68.9 % — SIGNIFICANT CHANGE UP (ref 43–77)
NRBC # BLD: 0 /100 WBCS — SIGNIFICANT CHANGE UP (ref 0–0)
PHOSPHATE SERPL-MCNC: 2 MG/DL — LOW (ref 2.5–4.5)
PLATELET # BLD AUTO: 222 K/UL — SIGNIFICANT CHANGE UP (ref 150–400)
POTASSIUM SERPL-MCNC: 4.2 MMOL/L — SIGNIFICANT CHANGE UP (ref 3.5–5.3)
POTASSIUM SERPL-SCNC: 4.2 MMOL/L — SIGNIFICANT CHANGE UP (ref 3.5–5.3)
RBC # BLD: 3.49 M/UL — LOW (ref 3.8–5.2)
RBC # FLD: 18.8 % — HIGH (ref 10.3–14.5)
SODIUM SERPL-SCNC: 132 MMOL/L — LOW (ref 135–145)
SPECIMEN SOURCE: SIGNIFICANT CHANGE UP
WBC # BLD: 5.29 K/UL — SIGNIFICANT CHANGE UP (ref 3.8–10.5)
WBC # FLD AUTO: 5.29 K/UL — SIGNIFICANT CHANGE UP (ref 3.8–10.5)

## 2022-07-28 PROCEDURE — 99232 SBSQ HOSP IP/OBS MODERATE 35: CPT

## 2022-07-28 PROCEDURE — 99233 SBSQ HOSP IP/OBS HIGH 50: CPT | Mod: GC

## 2022-07-28 RX ADMIN — Medication 105 MILLIGRAM(S): at 12:00

## 2022-07-28 RX ADMIN — LEVETIRACETAM 750 MILLIGRAM(S): 250 TABLET, FILM COATED ORAL at 12:00

## 2022-07-28 NOTE — PHYSICAL THERAPY INITIAL EVALUATION ADULT - GAIT DISTANCE, PT EVAL
bed<--> door, Pt declined to ambulated further distance despite of encouragement and VCs secondary pt stated that she cannot walk too far due to her medical condition./25 feet

## 2022-07-28 NOTE — PROGRESS NOTE ADULT - ATTENDING COMMENTS
No further seizures and vEEG clean currently on medications here.  Pt reporting 90% compliance on keppra XR though just at night.  Did not like AM adverse effects so discontinued it.  Increasing dose significant not ideal, per pt.  Pt in the middle of change in PCP and does not have neurologist.    Plan:  1) considering alternate or adjuctive medications - PB has benefits in patients with significant alcohol history, though negatives are the bone density issues and can cause somnolence.  Will re-visit tmrw.
56F PMH HTN, alcohol use disorder, recent seizure disorder (on keppra) presented with seizure and concern for EtOH withdrawal    #Seizure  - unclear if patient has underlying epilepsy of if all seizures have been in the setting of EtOH withdrawal  - c/w vEEG  - Epilepsy following, appreciate recs  - pending Keppra level and epilepsy considering changing patient to phenobarb given EtOH abuse hx    #EtOH abuse with withdrawal  - seizure likely in setting of EtOH withdrawal  - CIWA protocol  - c/w high dose thiamine concern for Wernicke's given hx of gait instability, poor memory, heavy EtOH abuse    #Gait instability  - PT recs outpatient PT    #Anemia  - normocytic and iron panel wnl.  Likely mixed in setting of EtOH related bone marrow suppression and dietary  - will f/u B12 and Folate    #HypoNa  - improving.  Likely hypovolemic and dietary  - Trend BMP    #High anion gap metabolic acidosis  - likely due to starvation ketosis,     Dispo: likely home with outpatient PT tomorrow pending Keppra level and final epilepsy recs on AEDs

## 2022-07-28 NOTE — PROGRESS NOTE ADULT - PROBLEM SELECTOR PLAN 2
Pt with recent seizure + witness seizure in ED noted to have generalized tonic clonic shaking w/ foaming at mouth. Lasted 1-2 minutes. Uncertain if history of seizure disorder. Per , no seizure prior to last 15 months. History of alcohol use disorder with daily alcohol use, but reports not drinking day of seizure. Seizure possibly alcohol withdrawal related vs. baseline seizure disorder.     - home med: Keppra 750 mg ER (likely noncompliant)  - f/u keppra level  - c/w Keppra 500mg IV BID, f/u epilepsy recs  - f/u vEEG   - CIWA protocol as per alcohol withdrawal discussed above  - Epilepsy consulted, f/u recs Pt with recent seizure + witness seizure in ED noted to have generalized tonic clonic shaking w/ foaming at mouth. Lasted 1-2 minutes. Uncertain if history of seizure disorder. Per , no seizure prior to last 15 months. History of alcohol use disorder with daily alcohol use, but reports not drinking day of seizure. Seizure possibly alcohol withdrawal related vs. baseline seizure disorder. Awaiting Keppra level results.     - home med: Keppra 750 mg ER (likely noncompliant)  - f/u keppra level  - c/w Keppra 500mg IV BID, f/u epilepsy recs  - f/u vEEG   - CIWA protocol as per alcohol withdrawal discussed above  - Epilepsy consulted, f/u recs Pt with recent seizure + witness seizure in ED noted to have generalized tonic clonic shaking w/ foaming at mouth. Lasted 1-2 minutes. Uncertain if history of seizure disorder. Per , no seizure prior to last 15 months. History of alcohol use disorder with daily alcohol use, but reports not drinking day of seizure. Seizure possibly alcohol withdrawal related vs. baseline seizure disorder. Awaiting Keppra level results. Per pharmacy, Keppra last picked up 03/2022  - f/u keppra level  - Increase Keppra 500mg Q12hrs to home dose 750mg ER QD per epilepsy   - f/u vEEG   - CIWA protocol as per alcohol withdrawal discussed above  - Epilepsy consulted, recs appreciated

## 2022-07-28 NOTE — PROGRESS NOTE ADULT - SUBJECTIVE AND OBJECTIVE BOX
** INCOMPLETE **    OVERNIGHT EVENTS:     SUBJECTIVE:    VITAL SIGNS:  Vital Signs Last 24 Hrs  T(C): 36.8 (28 Jul 2022 06:01), Max: 37.3 (27 Jul 2022 20:48)  T(F): 98.3 (28 Jul 2022 06:01), Max: 99.1 (27 Jul 2022 20:48)  HR: 68 (28 Jul 2022 06:01) (68 - 95)  BP: 124/82 (28 Jul 2022 06:01) (118/76 - 128/70)  BP(mean): --  RR: 18 (28 Jul 2022 06:01) (16 - 18)  SpO2: 98% (28 Jul 2022 06:01) (95% - 98%)    Parameters below as of 28 Jul 2022 06:01  Patient On (Oxygen Delivery Method): room air        PHYSICAL EXAM:  General: NAD; speaking in full sentences  HEENT: NC/AT; PERRL; EOMI; MMM  Neck: supple; no JVD  Cardiac: RRR; +S1/S2  Pulm: CTA B/L; no W/R/R  GI: soft, NT/ND, +BS  Extremities: WWP; no edema, clubbing or cyanosis  Vasc: 2+ radial, DP pulses B/L  Neuro: AAOx3; no focal deficits    MEDICATIONS:  MEDICATIONS  (STANDING):  enoxaparin Injectable 40 milliGRAM(s) SubCutaneous every 24 hours  levETIRAcetam 750 milliGRAM(s) Oral daily  thiamine IVPB 500 milliGRAM(s) IV Intermittent daily    MEDICATIONS  (PRN):  acetaminophen     Tablet .. 650 milliGRAM(s) Oral every 6 hours PRN Temp greater or equal to 38C (100.4F), Mild Pain (1 - 3)  diazepam  Injectable 5 milliGRAM(s) IV Push every 1 hour PRN CIWA score >8      ALLERGIES:  Allergies    No Known Allergies    Intolerances        LABS:                        10.0   6.45  )-----------( 222      ( 27 Jul 2022 12:34 )             31.0     07-27    136  |  100  |  3<L>  ----------------------------<  124<H>  3.2<L>   |  24  |  0.34<L>    Ca    8.7      27 Jul 2022 12:34  Phos  2.4     07-27  Mg     2.4     07-27    TPro  7.8  /  Alb  4.6  /  TBili  0.4  /  DBili  x   /  AST  55<H>  /  ALT  26  /  AlkPhos  111  07-27        RADIOLOGY & ADDITIONAL TESTS: Reviewed. ** INCOMPLETE **    OVERNIGHT EVENTS: No acute events overnight. Was monitored on vEEG overnight.     SUBJECTIVE: Patient was seen and examined at bedside. She denies pain overnight. She denies feeling confused. She denies tremors. She denies nausea. She states that she had some difficulty holding her urine yesterday and overnight, but that it is resolving this morning. ROS otherwise negative.     VITAL SIGNS:  Vital Signs Last 24 Hrs  T(C): 36.8 (28 Jul 2022 06:01), Max: 37.3 (27 Jul 2022 20:48)  T(F): 98.3 (28 Jul 2022 06:01), Max: 99.1 (27 Jul 2022 20:48)  HR: 68 (28 Jul 2022 06:01) (68 - 95)  BP: 124/82 (28 Jul 2022 06:01) (118/76 - 128/70)  BP(mean): --  RR: 18 (28 Jul 2022 06:01) (16 - 18)  SpO2: 98% (28 Jul 2022 06:01) (95% - 98%)    Parameters below as of 28 Jul 2022 06:01  Patient On (Oxygen Delivery Method): room air        PHYSICAL EXAM:  General: NAD; speaking in full sentences  HEENT: NC/AT; PERRL; EOMI; MMM  Neck: supple; no JVD  Cardiac: RRR; +S1/S2  Pulm: CTA B/L; no W/R/R  GI: soft, NT/ND, +BS  Extremities: WWP; no edema, clubbing or cyanosis  Vasc: 2+ radial, DP pulses B/L  Neuro: AAOx3 this morning; was said to be AAOx2 in ED; no focal deficits; CIWA score= 0    MEDICATIONS:  MEDICATIONS  (STANDING):  enoxaparin Injectable 40 milliGRAM(s) SubCutaneous every 24 hours  levETIRAcetam 750 milliGRAM(s) Oral daily  thiamine IVPB 500 milliGRAM(s) IV Intermittent daily    MEDICATIONS  (PRN):  acetaminophen     Tablet .. 650 milliGRAM(s) Oral every 6 hours PRN Temp greater or equal to 38C (100.4F), Mild Pain (1 - 3)  diazepam  Injectable 5 milliGRAM(s) IV Push every 1 hour PRN CIWA score >8      ALLERGIES:  Allergies    No Known Allergies    Intolerances        LABS:                        10.0   6.45  )-----------( 222      ( 27 Jul 2022 12:34 )             31.0     07-27    136  |  100  |  3<L>  ----------------------------<  124<H>  3.2<L>   |  24  |  0.34<L>    Ca    8.7      27 Jul 2022 12:34  Phos  2.4     07-27  Mg     2.4     07-27    TPro  7.8  /  Alb  4.6  /  TBili  0.4  /  DBili  x   /  AST  55<H>  /  ALT  26  /  AlkPhos  111  07-27        RADIOLOGY & ADDITIONAL TESTS: Reviewed. ** INCOMPLETE **    OVERNIGHT EVENTS: No acute events overnight. Was monitored on vEEG overnight.     SUBJECTIVE: Patient was seen and examined at bedside. She denies pain overnight. She denies feeling confused. She denies tremors. She denies nausea. She states that she had some difficulty holding her urine yesterday and overnight, but that it is resolving this morning. She states that she has had some feelings of imbalance for the last 6 months where she feels she is "unsteady". ROS otherwise negative.     VITAL SIGNS:  Vital Signs Last 24 Hrs  T(C): 36.8 (28 Jul 2022 06:01), Max: 37.3 (27 Jul 2022 20:48)  T(F): 98.3 (28 Jul 2022 06:01), Max: 99.1 (27 Jul 2022 20:48)  HR: 68 (28 Jul 2022 06:01) (68 - 95)  BP: 124/82 (28 Jul 2022 06:01) (118/76 - 128/70)  BP(mean): --  RR: 18 (28 Jul 2022 06:01) (16 - 18)  SpO2: 98% (28 Jul 2022 06:01) (95% - 98%)    Parameters below as of 28 Jul 2022 06:01  Patient On (Oxygen Delivery Method): room air        PHYSICAL EXAM:  General: NAD; speaking in full sentences  HEENT: NC/AT; PERRL; EOMI; MMM  Neck: supple; no JVD  Cardiac: RRR; +S1/S2  Pulm: CTA B/L; no W/R/R  GI: soft, NT/ND, +BS  Extremities: WWP; no edema, clubbing or cyanosis  Vasc: 2+ radial, DP pulses B/L  Neuro: AAOx3 this morning; was said to be AAOx2 in ED; no focal deficits; CIWA score= 0    MEDICATIONS:  MEDICATIONS  (STANDING):  enoxaparin Injectable 40 milliGRAM(s) SubCutaneous every 24 hours  levETIRAcetam 750 milliGRAM(s) Oral daily  thiamine IVPB 500 milliGRAM(s) IV Intermittent daily    MEDICATIONS  (PRN):  acetaminophen     Tablet .. 650 milliGRAM(s) Oral every 6 hours PRN Temp greater or equal to 38C (100.4F), Mild Pain (1 - 3)  diazepam  Injectable 5 milliGRAM(s) IV Push every 1 hour PRN CIWA score >8      ALLERGIES:  Allergies    No Known Allergies    Intolerances        LABS:                        10.0   6.45  )-----------( 222      ( 27 Jul 2022 12:34 )             31.0     07-27    136  |  100  |  3<L>  ----------------------------<  124<H>  3.2<L>   |  24  |  0.34<L>    Ca    8.7      27 Jul 2022 12:34  Phos  2.4     07-27  Mg     2.4     07-27    TPro  7.8  /  Alb  4.6  /  TBili  0.4  /  DBili  x   /  AST  55<H>  /  ALT  26  /  AlkPhos  111  07-27        RADIOLOGY & ADDITIONAL TESTS: Reviewed. OVERNIGHT EVENTS: No acute events overnight. Was monitored on vEEG overnight.     SUBJECTIVE: Patient was seen and examined at bedside. She denies pain overnight. She denies feeling confused. She denies tremors. She denies nausea. She states that she had some difficulty holding her urine yesterday and overnight, but that it is resolving this morning. She states that she has had some feelings of imbalance for the last 6 months where she feels she is "unsteady". ROS otherwise negative.     VITAL SIGNS:  Vital Signs Last 24 Hrs  T(C): 36.8 (28 Jul 2022 06:01), Max: 37.3 (27 Jul 2022 20:48)  T(F): 98.3 (28 Jul 2022 06:01), Max: 99.1 (27 Jul 2022 20:48)  HR: 68 (28 Jul 2022 06:01) (68 - 95)  BP: 124/82 (28 Jul 2022 06:01) (118/76 - 128/70)  BP(mean): --  RR: 18 (28 Jul 2022 06:01) (16 - 18)  SpO2: 98% (28 Jul 2022 06:01) (95% - 98%)    Parameters below as of 28 Jul 2022 06:01  Patient On (Oxygen Delivery Method): room air      PHYSICAL EXAM:  General: NAD; speaking in full sentences  HEENT: NC/AT; PERRL; EOMI; MMM  Neck: supple; no JVD  Cardiac: RRR; +S1/S2  Pulm: CTA B/L; no W/R/R  GI: soft, NT/ND, +BS  Extremities: WWP; no edema, clubbing or cyanosis, muscle strength 5/5 b/l LE and UE   Vasc: 2+ radial, DP pulses B/L  Neuro: AAOx3 this morning; was said to be AAOx2 in ED; no focal deficits; CIWA score= 0, CN 1-12 intact, sensation to b/l LE intact and symmetric    MEDICATIONS:  MEDICATIONS  (STANDING):  enoxaparin Injectable 40 milliGRAM(s) SubCutaneous every 24 hours  levETIRAcetam 750 milliGRAM(s) Oral daily  thiamine IVPB 500 milliGRAM(s) IV Intermittent daily    MEDICATIONS  (PRN):  acetaminophen     Tablet .. 650 milliGRAM(s) Oral every 6 hours PRN Temp greater or equal to 38C (100.4F), Mild Pain (1 - 3)  diazepam  Injectable 5 milliGRAM(s) IV Push every 1 hour PRN CIWA score >8      ALLERGIES:  Allergies    No Known Allergies    Intolerances        LABS:                        10.0   6.45  )-----------( 222      ( 27 Jul 2022 12:34 )             31.0     07-27    136  |  100  |  3<L>  ----------------------------<  124<H>  3.2<L>   |  24  |  0.34<L>    Ca    8.7      27 Jul 2022 12:34  Phos  2.4     07-27  Mg     2.4     07-27    TPro  7.8  /  Alb  4.6  /  TBili  0.4  /  DBili  x   /  AST  55<H>  /  ALT  26  /  AlkPhos  111  07-27        RADIOLOGY & ADDITIONAL TESTS: Reviewed.

## 2022-07-28 NOTE — PROGRESS NOTE ADULT - PROBLEM SELECTOR PLAN 5
Hg 10.7 at admission w/ normal MCV. Likely in setting in decreased PO intake.     - f/u iron studies and folate Hg 10.7 at admission w/ normal MCV. Likely in setting in decreased PO intake.   - f/u iron studies and folate

## 2022-07-28 NOTE — PHYSICAL THERAPY INITIAL EVALUATION ADULT - ADDITIONAL COMMENTS
Pt lives with family in an apt with elevator. Prior to admission, pt ambulated independently without AD. Pt states that she has 1 or 2 falls this year due to seizure and loses of balance.

## 2022-07-28 NOTE — PROGRESS NOTE ADULT - PROBLEM SELECTOR PLAN 1
Pt with alcohol use disorder as per . Does not know how much pt drinks but often finds bottles of vodka in garbage. Suspected last drunk 7/25. Per , pt baseline AAOx2 (difficulty remembering time) and dependent on ADLs. Negative CT head and no FND on exam. Blood alcohol <10. Pt afebrile, no leukocytosis. Received 2mg ativan in ED.      On exam, pt mildly agitated and anxious in bed, non combative but needing redirection. AAOx3. Denies nausea, non tremulous, no hallucinations. CIWA 3.    - d/c fluids  - c/w CIWA protocol  - thiamine IV 500mg  - started MV and folate  - f/u folate, Vit B12  - f/u SBIRT consult Pt with alcohol use disorder as per . Does not know how much pt drinks but often finds bottles of vodka in garbage. Suspected last drunk 7/25. Per , pt baseline AAOx2 (difficulty remembering time) and dependent on ADLs. Negative CT head and no FND on exam. Blood alcohol <10. Pt afebrile, no leukocytosis. Received 2mg ativan in ED.    On exam in ED, pt mildly agitated and anxious in bed, non combative but needing redirection. AAOx3. Denies nausea, non tremulous, no hallucinations. CIWA 3.  On exam, 7/28/22, patient was AAOx3, non combative but needing redirection, not linear stories. CIWA 0.     - d/c fluids  - c/w CIWA protocol  - thiamine IV 500mg  - started MV and folate  - f/u folate, Vit B12  - f/u SBIRT consult Pt with alcohol use disorder as per . Does not know how much pt drinks but often finds bottles of vodka in garbage. Suspected last drunk 7/25. Per , pt baseline AAOx2 (difficulty remembering time) and dependent on ADLs. Negative CT head and no FND on exam. Blood alcohol <10. Pt afebrile, no leukocytosis. Received 2mg ativan in ED.    On exam in ED, pt mildly agitated and anxious in bed, non combative but needing redirection. AAOx3. Denies nausea, non tremulous, no hallucinations. CIWA 3.  On exam on medical floor 7/28/22, patient was AAOx3, non combative but needing redirection, not linear stories. CIWA 0.     - d/c fluids  - c/w CIWA protocol  - thiamine IV 500mg  - started MV and folate  - f/u folate, Vit B12  - f/u SBIRT consult Pt with alcohol use disorder as per  who states they find bottles of vodka in garbage that belongs to pt. Suspected last drink 7/25. Negative CT head and no FND on exam. Blood alcohol <10. Pt afebrile, no leukocytosis. Received 2mg ativan in ED. On exam in ED, pt mildly agitated and anxious in bed, non combative but needing redirection. AAOx3. 7/28/22, patient was AAOx3, non combative but needing redirection, not linear stories. CIWA 0.   - d/c fluids  - c/w CIWA protocol  - thiamine IV 500mg  - started MV and folate  - f/u folate, Vit B12  - f/u SBIRT consult

## 2022-07-28 NOTE — EEG REPORT - NS EEG TEXT BOX
[facility] Department of Neurology  Inpatient Continuous video-Electroencephalogram    Patient Name:	MELODY MILLS    :	1965  MRN:	[id number]    Study Start Date/Time:  2022, 9:20:56 AM  Study End Date/Time:	2022, 2:58 PM    Referred by: Dr. Woody Lane    Brief Clinical History:  MELODY MILLS is a 56 year old Female with history of seizures or seizure-like events and frequent high alcohol consumption; study performed to investigate for seizures or markers of epilepsy.  Technologist notes:-    Diagnosis Code:   R56.9 convulsions/seizure  The live video was: monitored continuously by trained technicians.    Pertinent Medications:  LEV XR 750mg daily, lorazepam IV in the ED    Acquisition Details:  Electroencephalography was acquired using a minimum of 21 channels on an [employer] Neu[employer] system v 8.5.1 with electrode placement according to the standard International 10-20 system following ACNS (American Clinical Neurophysiology Society) guidelines for Long-Term Video EEG monitoring.  Anterior temporal T1 and T2 electrodes were utilized whenever possible.   The [employer] automated spike & seizure detections were all reviewed in detail, in addition to extensive portions of raw EEG.      Day 1: 2022 @ 9:20:56 AM to next morning @ 07:00 am  Background:  continuous, with predominantly alpha and beta frequencies.  Symmetry:  No persistent asymmetries of voltage or frequency.  Posterior Dominant Rhythm:  12 Hz symmetric, well-organized, and well-modulated.  Organization: Appropriate anterior-posterior gradient.  Voltage:  Normal (20+ uV)  Variability: Yes. 		Reactivity: Yes.  N2 sleep: Symmetric, synchronous spindles and K complexes.  Spontaneous Activity:  No epileptiform discharges.  Periodic/rhythmic activity:  None.  Events:  No electrographic seizures or significant clinical events.  Provocations:  Hyperventilation and Photic stimulation: was not performed.    Daily Summary:    Unremarkable awake and sleep overnight EEG recording  No epileptiform activity and no significant clinical events occurred.    Brandt Johns MD  Attending Neurologist, [facility]

## 2022-07-28 NOTE — PROGRESS NOTE ADULT - PROBLEM SELECTOR PLAN 4
Na 127 on admission with decreased serum osmolality. Likely poor PO intake in setting of alcohol use. AAOx3    - f/u urine studies  - f/u repeat BMP Resolving. Na 127 on admission with decreased serum osmolality. Likely poor PO intake in setting of alcohol use. AAOx3. Repeat labs Na 136.     - f/u urine studies  - f/u repeat BMP Resolving. Na 127 on admission with decreased serum osmolality. Likely poor PO intake in setting of alcohol use. AAOx3. Repeat labs Na 136.   - f/u urine studies  - f/u repeat BMP

## 2022-07-28 NOTE — PHYSICAL THERAPY INITIAL EVALUATION ADULT - MODALITIES TREATMENT COMMENTS
--- hand dominant; (R) CN Testing: B/L Frontalis intact; B/L buccinator intact; smile symmetrical; tongue protrusion at midline; B/L eyes open/close intact ; Vision H-Test: bilateral tracking and smooth pursuit intact; Convergence/Divergence: intact; Vision Quadrant Test: unable to perform secondary pt has difficult to follow PT's instruction.

## 2022-07-28 NOTE — PROGRESS NOTE ADULT - ASSESSMENT
56F PMH HTN, alcohol use disorder, recent seizure disorder (on keppra) presented with seizure that occurred 7/26 at 10:30pm c/b additional seizure in the ED.

## 2022-07-28 NOTE — PROGRESS NOTE ADULT - PROBLEM SELECTOR PLAN 7
F: mIVF  E: replete K<4, Mg<2  N: NPO    VTE Prophylaxis: Lovenox SubQ  C: Full Code  D: [facility]

## 2022-07-28 NOTE — PROGRESS NOTE ADULT - PROBLEM SELECTOR PLAN 3
AG 24 w/ bicarb 19 on admission likely 2/2 starvation ketosis as elevated BHB and normal lactate. No osmolar gap.     - f/u repeat BMP   - monitor Is and Os Resolving. AG 24 w/ bicarb 19 on admission likely 2/2 starvation ketosis as elevated BHB and normal lactate. No osmolar gap. AG 12 this am (7/28/22)     - f/u repeat BMP   - monitor Is and Os Resolving. AG 24 w/ bicarb 19 on admission likely 2/2 starvation ketosis as elevated BHB and normal lactate. No osmolar gap. AG 12 this am (7/28/22)   - f/u repeat BMP   - monitor Is and Os

## 2022-07-28 NOTE — PHYSICAL THERAPY INITIAL EVALUATION ADULT - LIGHT TOUCH SENSATION, RUE, REHAB EVAL
within normal limits
Misbah Lang)  Pediatrics  [address] [place] [address]  Phone: [phone]  Fax: [phone]  Established Patient  Follow Up Time: 1-3 Days

## 2022-07-28 NOTE — PHYSICAL THERAPY INITIAL EVALUATION ADULT - GAIT DEVIATIONS NOTED, PT EVAL
increase tommie, requires VCs from PT to slow down, no lose of balance, decreased heel strike and hip flexion bilaterally./increased time in double stance

## 2022-07-28 NOTE — PHYSICAL THERAPY INITIAL EVALUATION ADULT - PERTINENT HX OF CURRENT PROBLEM, REHAB EVAL
Pt is a 55 yo female presented with seizure that occurred 7/26 at 10:30pm c/b additional seizure in the ED.

## 2022-07-28 NOTE — PROGRESS NOTE ADULT - PROBLEM SELECTOR PLAN 6
Hx of HTN. /95 on admission. Currently Normotensive.  Home meds: clonidine PO 1 mg BID, hydralazine PO 25mg BID with poor adherence.    - continue to monitor BPs

## 2022-07-28 NOTE — PHYSICAL THERAPY INITIAL EVALUATION ADULT - GENERAL OBSERVATIONS, REHAB EVAL
Pt received semi supine in bed with +IV, +EEG, +enhanced supervision, NAD. Pt left as found, NAD, call bell in reach, line intact, enhanced supervision present, RN awares.

## 2022-07-28 NOTE — PROGRESS NOTE ADULT - ASSESSMENT
57 y/o female with PMH of HTN, alcohol use disorder, and recent seizure-like activities (1/2022) who was admitted on 7/27/22 for a seizure like event and another episode in ED.     #Seizures:  Unclear if seizure events are provoked by ETOH withdrawal vs seizures given CTH w/ no acute pathology, personal hx of events related to ETOH use, and no identifiable seizures.   Recommendations:  - continue vEEG monitoring  - Pending Keppra level  - c/w Keppra 750mg ER QD  - Maintain seizure fall and seizure precautions     #Balance:  Pt also reports instability due to balance issues, although improving. Unclear if related to alcohol use or some other cause. Given normal gait, negative Romberg, and lack of dysmetria, cerebellar dysfunction less likely. Given intact sensation to touch, vibration, and proprioception throughout, with symmetry b/l, neuropathy less likely.   Recommendations:  - order folate and vitamin B12 to assess for metabolic causes of imbalance    Case d/w Dr. Johns     57 y/o female with PMH of HTN, alcohol use disorder, and recent seizure-like activities (1/2022) who was admitted on 7/27/22 for a seizure like event and another episode in ED.     #Seizures:  Unclear if seizure events are provoked by ETOH withdrawal vs seizures given CTH w/ no acute pathology, personal hx of events related to ETOH use, and no identifiable seizures.   Recommendations:  - continue vEEG monitoring  - Pending Keppra level  - depending on Keppra level, may consider possibility of changing to alternative medication that may be more suited to patient's lifestyle such as phenobarbital with added effect of decreasing alcohol cravings  - c/w Keppra 750mg QD  - Maintain seizure fall and seizure precautions     #Balance:  Pt also reports instability due to balance issues with a recent fall, although improving. Unclear if related to alcohol use or some other cause. Given normal gait, negative Romberg, and lack of dysmetria, cerebellar dysfunction less likely. Given intact sensation to touch, vibration, and proprioception throughout, with symmetry b/l, neuropathy less likely.   Recommendations:  - order folate and vitamin B12 to assess for metabolic causes of imbalance  - consider PT given patient's recent fall    Case d/w Dr. Johns     57 y/o female with PMH of HTN, alcohol use disorder, and recent seizure-like activities (1/2022) who was admitted on 7/27/22 for a seizure like event and another episode in ED.     #Seizures:  Unclear if seizure events are provoked by ETOH withdrawal vs seizures given CTH w/ no acute pathology, personal hx of events related to ETOH use, and no identifiable seizures.   Recommendations:  - continue vEEG monitoring  - Pending Keppra level  - depending on Keppra level, may consider possibility of changing to alternative medication that may be more suited to patient's lifestyle such as phenobarbital with added effect of decreasing alcohol cravings  - c/w Keppra 750mg QD  - Maintain seizure fall and seizure precautions     #Balance:  Pt also reports instability due to balance issues with a recent fall, although improving. Unclear if related to alcohol use or some other cause. Given normal gait, negative Romberg, and lack of dysmetria on exam, cerebellar dysfunction less likely. Given intact sensation to touch, vibration, and proprioception throughout, with symmetry b/l, neuropathy less likely.   Recommendations:  - order folate and vitamin B12 to assess for metabolic causes of imbalance  - consider PT given patient's recent fall    Case d/w Dr. Johns

## 2022-07-29 ENCOUNTER — TRANSCRIPTION ENCOUNTER (OUTPATIENT)
Age: 57
End: 2022-07-29

## 2022-07-29 VITALS
SYSTOLIC BLOOD PRESSURE: 154 MMHG | TEMPERATURE: 99 F | DIASTOLIC BLOOD PRESSURE: 82 MMHG | OXYGEN SATURATION: 99 % | HEART RATE: 70 BPM | RESPIRATION RATE: 18 BRPM

## 2022-07-29 LAB
ANION GAP SERPL CALC-SCNC: 10 MMOL/L — SIGNIFICANT CHANGE UP (ref 5–17)
BASOPHILS # BLD AUTO: 0.06 K/UL — SIGNIFICANT CHANGE UP (ref 0–0.2)
BASOPHILS NFR BLD AUTO: 0.8 % — SIGNIFICANT CHANGE UP (ref 0–2)
BUN SERPL-MCNC: 8 MG/DL — SIGNIFICANT CHANGE UP (ref 7–23)
CALCIUM SERPL-MCNC: 10.3 MG/DL — SIGNIFICANT CHANGE UP (ref 8.4–10.5)
CHLORIDE SERPL-SCNC: 94 MMOL/L — LOW (ref 96–108)
CO2 SERPL-SCNC: 27 MMOL/L — SIGNIFICANT CHANGE UP (ref 22–31)
CREAT SERPL-MCNC: 0.39 MG/DL — LOW (ref 0.5–1.3)
EGFR: 117 ML/MIN/1.73M2 — SIGNIFICANT CHANGE UP
EOSINOPHIL # BLD AUTO: 0.04 K/UL — SIGNIFICANT CHANGE UP (ref 0–0.5)
EOSINOPHIL NFR BLD AUTO: 0.5 % — SIGNIFICANT CHANGE UP (ref 0–6)
FOLATE SERPL-MCNC: 14.9 NG/ML — SIGNIFICANT CHANGE UP
GLUCOSE SERPL-MCNC: 117 MG/DL — HIGH (ref 70–99)
HCT VFR BLD CALC: 30.7 % — LOW (ref 34.5–45)
HGB BLD-MCNC: 9.6 G/DL — LOW (ref 11.5–15.5)
IMM GRANULOCYTES NFR BLD AUTO: 0.5 % — SIGNIFICANT CHANGE UP (ref 0–1.5)
LYMPHOCYTES # BLD AUTO: 1.11 K/UL — SIGNIFICANT CHANGE UP (ref 1–3.3)
LYMPHOCYTES # BLD AUTO: 14.2 % — SIGNIFICANT CHANGE UP (ref 13–44)
MAGNESIUM SERPL-MCNC: 1.7 MG/DL — SIGNIFICANT CHANGE UP (ref 1.6–2.6)
MCHC RBC-ENTMCNC: 28.9 PG — SIGNIFICANT CHANGE UP (ref 27–34)
MCHC RBC-ENTMCNC: 31.3 GM/DL — LOW (ref 32–36)
MCV RBC AUTO: 92.5 FL — SIGNIFICANT CHANGE UP (ref 80–100)
MONOCYTES # BLD AUTO: 1.3 K/UL — HIGH (ref 0–0.9)
MONOCYTES NFR BLD AUTO: 16.7 % — HIGH (ref 2–14)
NEUTROPHILS # BLD AUTO: 5.24 K/UL — SIGNIFICANT CHANGE UP (ref 1.8–7.4)
NEUTROPHILS NFR BLD AUTO: 67.3 % — SIGNIFICANT CHANGE UP (ref 43–77)
NRBC # BLD: 0 /100 WBCS — SIGNIFICANT CHANGE UP (ref 0–0)
PHOSPHATE SERPL-MCNC: 3.4 MG/DL — SIGNIFICANT CHANGE UP (ref 2.5–4.5)
PLATELET # BLD AUTO: 236 K/UL — SIGNIFICANT CHANGE UP (ref 150–400)
POTASSIUM SERPL-MCNC: 4.7 MMOL/L — SIGNIFICANT CHANGE UP (ref 3.5–5.3)
POTASSIUM SERPL-SCNC: 4.7 MMOL/L — SIGNIFICANT CHANGE UP (ref 3.5–5.3)
RBC # BLD: 3.32 M/UL — LOW (ref 3.8–5.2)
RBC # FLD: 18.6 % — HIGH (ref 10.3–14.5)
SODIUM SERPL-SCNC: 131 MMOL/L — LOW (ref 135–145)
VIT B12 SERPL-MCNC: 467 PG/ML — SIGNIFICANT CHANGE UP (ref 232–1245)
WBC # BLD: 7.79 K/UL — SIGNIFICANT CHANGE UP (ref 3.8–10.5)
WBC # FLD AUTO: 7.79 K/UL — SIGNIFICANT CHANGE UP (ref 3.8–10.5)

## 2022-07-29 PROCEDURE — 82728 ASSAY OF FERRITIN: CPT

## 2022-07-29 PROCEDURE — 80307 DRUG TEST PRSMV CHEM ANLYZR: CPT

## 2022-07-29 PROCEDURE — 83550 IRON BINDING TEST: CPT

## 2022-07-29 PROCEDURE — 97162 PT EVAL MOD COMPLEX 30 MIN: CPT

## 2022-07-29 PROCEDURE — 80048 BASIC METABOLIC PNL TOTAL CA: CPT

## 2022-07-29 PROCEDURE — 82746 ASSAY OF FOLIC ACID SERUM: CPT

## 2022-07-29 PROCEDURE — 83540 ASSAY OF IRON: CPT

## 2022-07-29 PROCEDURE — 82550 ASSAY OF CK (CPK): CPT

## 2022-07-29 PROCEDURE — 80177 DRUG SCRN QUAN LEVETIRACETAM: CPT

## 2022-07-29 PROCEDURE — 84132 ASSAY OF SERUM POTASSIUM: CPT

## 2022-07-29 PROCEDURE — 95700 EEG CONT REC W/VID EEG TECH: CPT

## 2022-07-29 PROCEDURE — 87086 URINE CULTURE/COLONY COUNT: CPT

## 2022-07-29 PROCEDURE — 83930 ASSAY OF BLOOD OSMOLALITY: CPT

## 2022-07-29 PROCEDURE — 83735 ASSAY OF MAGNESIUM: CPT

## 2022-07-29 PROCEDURE — 84295 ASSAY OF SERUM SODIUM: CPT

## 2022-07-29 PROCEDURE — 82570 ASSAY OF URINE CREATININE: CPT

## 2022-07-29 PROCEDURE — 86850 RBC ANTIBODY SCREEN: CPT

## 2022-07-29 PROCEDURE — 83605 ASSAY OF LACTIC ACID: CPT

## 2022-07-29 PROCEDURE — 70450 CT HEAD/BRAIN W/O DYE: CPT | Mod: MA

## 2022-07-29 PROCEDURE — 87635 SARS-COV-2 COVID-19 AMP PRB: CPT

## 2022-07-29 PROCEDURE — 99233 SBSQ HOSP IP/OBS HIGH 50: CPT | Mod: GC

## 2022-07-29 PROCEDURE — 86901 BLOOD TYPING SEROLOGIC RH(D): CPT

## 2022-07-29 PROCEDURE — 82607 VITAMIN B-12: CPT

## 2022-07-29 PROCEDURE — 72125 CT NECK SPINE W/O DYE: CPT | Mod: MA

## 2022-07-29 PROCEDURE — 84466 ASSAY OF TRANSFERRIN: CPT

## 2022-07-29 PROCEDURE — 85025 COMPLETE CBC W/AUTO DIFF WBC: CPT

## 2022-07-29 PROCEDURE — 97116 GAIT TRAINING THERAPY: CPT

## 2022-07-29 PROCEDURE — 99233 SBSQ HOSP IP/OBS HIGH 50: CPT

## 2022-07-29 PROCEDURE — 85045 AUTOMATED RETICULOCYTE COUNT: CPT

## 2022-07-29 PROCEDURE — 99285 EMERGENCY DEPT VISIT HI MDM: CPT | Mod: 25

## 2022-07-29 PROCEDURE — 95716 VEEG EA 12-26HR CONT MNTR: CPT

## 2022-07-29 PROCEDURE — 80053 COMPREHEN METABOLIC PANEL: CPT

## 2022-07-29 PROCEDURE — 83935 ASSAY OF URINE OSMOLALITY: CPT

## 2022-07-29 PROCEDURE — 82553 CREATINE MB FRACTION: CPT

## 2022-07-29 PROCEDURE — 82330 ASSAY OF CALCIUM: CPT

## 2022-07-29 PROCEDURE — 86803 HEPATITIS C AB TEST: CPT

## 2022-07-29 PROCEDURE — 93005 ELECTROCARDIOGRAM TRACING: CPT

## 2022-07-29 PROCEDURE — 82803 BLOOD GASES ANY COMBINATION: CPT

## 2022-07-29 PROCEDURE — 96375 TX/PRO/DX INJ NEW DRUG ADDON: CPT

## 2022-07-29 PROCEDURE — 71045 X-RAY EXAM CHEST 1 VIEW: CPT

## 2022-07-29 PROCEDURE — 84702 CHORIONIC GONADOTROPIN TEST: CPT

## 2022-07-29 PROCEDURE — 96374 THER/PROPH/DIAG INJ IV PUSH: CPT

## 2022-07-29 PROCEDURE — 82010 KETONE BODYS QUAN: CPT

## 2022-07-29 PROCEDURE — 36415 COLL VENOUS BLD VENIPUNCTURE: CPT

## 2022-07-29 PROCEDURE — 84300 ASSAY OF URINE SODIUM: CPT

## 2022-07-29 PROCEDURE — 86900 BLOOD TYPING SEROLOGIC ABO: CPT

## 2022-07-29 PROCEDURE — 84100 ASSAY OF PHOSPHORUS: CPT

## 2022-07-29 PROCEDURE — 81001 URINALYSIS AUTO W/SCOPE: CPT

## 2022-07-29 PROCEDURE — 84443 ASSAY THYROID STIM HORMONE: CPT

## 2022-07-29 RX ORDER — PHENOBARBITAL 60 MG
1 TABLET ORAL
Qty: 30 | Refills: 1
Start: 2022-07-29 | End: 2022-09-26

## 2022-07-29 RX ORDER — PHENOBARBITAL 60 MG
1 TABLET ORAL
Qty: 28 | Refills: 0
Start: 2022-07-29 | End: 2022-08-25

## 2022-07-29 RX ORDER — PHENOBARBITAL 60 MG
32.4 TABLET ORAL ONCE
Refills: 0 | Status: DISCONTINUED | OUTPATIENT
Start: 2022-07-29 | End: 2022-07-29

## 2022-07-29 RX ORDER — PHENOBARBITAL 60 MG
0.5 TABLET ORAL
Qty: 14 | Refills: 0
Start: 2022-07-29 | End: 2022-08-25

## 2022-07-29 RX ORDER — LEVETIRACETAM 250 MG/1
1 TABLET, FILM COATED ORAL
Qty: 28 | Refills: 1
Start: 2022-07-29 | End: 2022-09-22

## 2022-07-29 RX ORDER — THIAMINE MONONITRATE (VIT B1) 100 MG
1 TABLET ORAL
Qty: 28 | Refills: 2
Start: 2022-07-29 | End: 2022-10-20

## 2022-07-29 RX ORDER — DIAZEPAM 5 MG
5 TABLET ORAL ONCE
Refills: 0 | Status: DISCONTINUED | OUTPATIENT
Start: 2022-07-29 | End: 2022-07-29

## 2022-07-29 RX ADMIN — Medication 32.4 MILLIGRAM(S): at 12:15

## 2022-07-29 RX ADMIN — Medication 105 MILLIGRAM(S): at 11:29

## 2022-07-29 RX ADMIN — LEVETIRACETAM 750 MILLIGRAM(S): 250 TABLET, FILM COATED ORAL at 11:29

## 2022-07-29 RX ADMIN — Medication 5 MILLIGRAM(S): at 04:24

## 2022-07-29 NOTE — DISCHARGE NOTE NURSING/CASE MANAGEMENT/SOCIAL WORK - PATIENT PORTAL LINK FT
You can access the FollowMyHealth Patient Portal offered by [facility] by registering at the following website: [email or web address]. By joining [facility]’s FollowMyHealth portal, you will also be able to view your health information using other applications (apps) compatible with our system.

## 2022-07-29 NOTE — PROGRESS NOTE ADULT - NS ATTEND AMEND GEN_ALL_CORE FT
EEG negative and pt refused further EEG.  A bit confused this morning, perhaps lorazepam effect wearing off and some mild alcohol withdrawal showing.    Phenobarb may help w/long half life, against alcohol related cortical excitability.  will f/u in 4-6 weeks.

## 2022-07-29 NOTE — DISCHARGE NOTE PROVIDER - HOSPITAL COURSE
#Discharge: do not delete    Patient is 57 yo F with past medical history of HTN, alcohol use disorder, recent seizure disorder presented with two seizures, found to have seizures that occurred on 7/26 c/b additional seizure in the ED.    Hospital course (by problem):       # Alcohol withdrawal.   Pt with alcohol use disorder as per  who states they find bottles of vodka in garbage that belongs to pt. Suspected last drink 7/25. Negative CT head and no FND on exam. Blood alcohol <10. Pt afebrile, no leukocytosis. Received 2mg ativan in ED. On exam in ED, pt mildly agitated and anxious in bed, non combative but needing redirection. AAOx3. 7/28/22, patient was AAOx3, non combative but needing redirection, not linear stories. CIWA 0 on 7/28 am, CIWA 11 on 7/29 received 5mg Diazepam. Recieved thiamine IV 500mg. Started MV and folate. F/u with folate and vit B12.     - f/u SBIRT consult.    #Seizure.    Pt with recent seizure + witness seizure in ED noted to have generalized tonic clonic shaking w/ foaming at mouth. Lasted 1-2 minutes. Uncertain if history of seizure disorder. Per , no seizure prior to last 15 months. History of alcohol use disorder with daily alcohol use, but reports not drinking day of seizure. Seizure possibly alcohol withdrawal related vs. baseline seizure disorder. Awaiting Keppra level results. Per pharmacy, Keppra last picked up 03/2022. Epilepsy monitoring on vEEG. Per epilepsy increase Keppra to 750mg QD.     # Increased anion gap metabolic acidosis.   Resolving. AG 24 w/ bicarb 19 on admission likely 2/2 starvation ketosis as elevated BHB and normal lactate. No osmolar gap. AG 12 on 7/28/22    # Hyponatremia.   Resolved. Na 127 on admission with decreased serum osmolality. Likely poor PO intake in setting of alcohol use. AAOx3. Repeat labs Na 136.     # Anemia.   ·  Plan: Hg 10.7 at admission w/ normal MCV. Likely in setting in decreased PO intake vs. alcohol use. f/u iron studies and folate, B12. Awaiting results.     # Hypertension.   Hx of HTN. /95 on admission. Currently Normotensive.  Home meds: clonidine PO 1 mg BID, hydralazine PO 25mg BID with poor adherence.      Patient was discharged to: home with home PT recs     New medications:     Changes to old medications:    Medications that were stopped:    Items to follow up as outpatient:  - Keppra 750mg   - Follow up with outpatient neurologist     Physical exam at the time of discharge:  T(C): 36.7 (07-28-22 @ 20:57), Max: 36.8 (07-28-22 @ 08:42)  HR: 60 (07-28-22 @ 20:57) (59 - 73)  BP: 117/76 (07-28-22 @ 20:57) (117/76 - 144/83)  RR: 16 (07-28-22 @ 20:57) (16 - 18)  SpO2: 99% (07-28-22 @ 20:57) (98% - 99%)    CONSTITUTIONAL: Anxious and agitated, some distress   EYES: PERRLA and symmetric, EOMI, No conjunctival or scleral injection, non-icteric  ENMT: Oral mucosa with moist membranes.  NECK: Supple, symmetric and without tracheal deviation; thyroid gland not enlarged and without palpable masses  RESPIRATORY: No respiratory distress, no use of accessory muscles; CTA b/l, no wheezes, rales or rhonchi, no dullness or hyperresonance to percussion, no tactile fremitus, no subcutaneous emphysema  CARDIOVASCULAR: RRR, +S1S2, no murmurs, no rubs, no gallops; no JVD; no peripheral edema  GASTROINTESTINAL: Soft, non tender, non distended, no rebound, no guarding; No palpable masses; no hepatosplenomegaly; no hernia palpated;  MUSCULOSKELETAL: Normal gait and station, however history of unsteady gait   SKIN: No rashes or ulcers noted; no subcutaneous nodules or induration palpable  NEUROLOGIC: CN II-XII intact; normal reflexes in upper and lower extremities, sensation intact in upper and lower extremities b/l to light touch; Babinski down b/l; no Kernig’s sign, no Brudzinski’s sign  PSYCHIATRIC: A+O x 3, but not linear thought, confused and trying to leave, mood and affect not appropriate, has trouble remembering recent events      #Discharge: do not delete    Patient is 55 yo F with past medical history of HTN, alcohol use disorder, recent seizure disorder presented with two seizures, found to have seizures that occurred on 7/26 c/b additional seizure in the ED.    Hospital course (by problem):   # Alcohol withdrawal.   Pt with alcohol use disorder as per  who states they find bottles of vodka in garbage that belongs to pt. Suspected last drink 7/25. Negative CT head and no FND on exam. Blood alcohol <10. Pt afebrile, no leukocytosis. Received 2mg ativan in ED. On exam in ED, pt mildly agitated and anxious in bed, non combative but needing redirection. AAOx3. 7/28/22, patient was AAOx3, non combative but needing redirection, not linear stories. CIWA 0 on 7/28 am, CIWA 11 on 7/29 received 5mg Diazepam. Recieved thiamine IV 500mg. Started MV and folate. F/u with folate and vit B12 levels.    #Seizure.    Pt with recent seizure + witness seizure in ED noted to have generalized tonic clonic shaking w/ foaming at mouth. Lasted 1-2 minutes. Uncertain if history of seizure disorder. Per , no seizure prior to last 15 months. History of alcohol use disorder with daily alcohol use, but reports not drinking day of seizure. Seizure possibly alcohol withdrawal related vs. baseline seizure disorder. Awaiting Keppra level results. Per pharmacy, Keppra last picked up 03/2022. Epilepsy monitoring on vEEG. Per epilepsy increase Keppra to 750mg QD.     # Increased anion gap metabolic acidosis.   Resolving. AG 24 w/ bicarb 19 on admission likely 2/2 starvation ketosis as elevated BHB and normal lactate. No osmolar gap. AG 12 on 7/28/22    # Hyponatremia.   Resolved. Na 127 on admission with decreased serum osmolality. Likely poor PO intake in setting of alcohol use. AAOx3. Repeat labs Na 136.     # Anemia.   ·  Plan: Hg 10.7 at admission w/ normal MCV. Likely in setting in decreased PO intake vs. alcohol use. f/u iron studies and folate, B12. Awaiting results.     # Hypertension.   Hx of HTN. /95 on admission. Currently Normotensive.  Home meds: clonidine PO 1 mg BID, hydralazine PO 25mg BID with poor adherence.      Patient was discharged to: home with home PT recs     New medications:     Changes to old medications:    Medications that were stopped:    Items to follow up as outpatient:  - Keppra 750mg   - Follow up with outpatient neurologist     Physical exam at the time of discharge:  T(C): 36.7 (07-28-22 @ 20:57), Max: 36.8 (07-28-22 @ 08:42)  HR: 60 (07-28-22 @ 20:57) (59 - 73)  BP: 117/76 (07-28-22 @ 20:57) (117/76 - 144/83)  RR: 16 (07-28-22 @ 20:57) (16 - 18)  SpO2: 99% (07-28-22 @ 20:57) (98% - 99%)    CONSTITUTIONAL: Anxious and agitated, in some distress   EYES: PERRLA and symmetric, EOMI, No conjunctival or scleral injection, non-icteric  ENMT: Oral mucosa with moist membranes  NECK: Supple, symmetric and without tracheal deviation; thyroid gland not enlarged and without palpable masses  RESPIRATORY: No respiratory distress, no use of accessory muscles; CTA b/l, no wheezes, rales or rhonchi, no dullness or hyperresonance to percussion, no tactile fremitus, no subcutaneous emphysema  CARDIOVASCULAR: RRR, +S1S2, no murmurs, no rubs, no gallops; no JVD; no peripheral edema  GASTROINTESTINAL: Soft, non tender, non distended, no rebound, no guarding  MUSCULOSKELETAL: Normal gait and station, however history of unsteady gait   SKIN: No rashes or ulcers noted; no subcutaneous nodules or induration palpable  NEUROLOGIC: CN II-XII intact  PSYCHIATRIC: A+O x 3, but not linear thought, confused and trying to leave, mood and affect not appropriate, has trouble remembering recent events      #Discharge: do not delete    Patient is 57 yo F with past medical history of HTN, alcohol use disorder, recent seizure disorder presented with two seizures, found to have seizures that occurred on 7/26 c/b additional seizure in the ED.    Hospital course (by problem):   # Alcohol withdrawal.   Pt with alcohol use disorder as per  who states they find bottles of vodka in garbage that belongs to pt. Suspected last drink 7/25. Negative CT head and no FND on exam. Blood alcohol <10. Pt afebrile, no leukocytosis. Received 2mg ativan in ED. On exam in ED, pt mildly agitated and anxious in bed, non combative but needing redirection. AAOx3. 7/28/22, patient was AAOx3, non combative but needing redirection, not linear stories. Recieved thiamine IV 500mg. Started MV and folate. CIWA 0 on 7/28 am, CIWA 11 overnight on 7/28-7/29 received 5mg Diazepam. Awaiting folate and vit B12 levels.    #Seizure.    Pt with recent seizure + witness seizure in ED noted to have generalized tonic clonic shaking w/ foaming at mouth. Lasted 1-2 minutes. Uncertain if history of seizure disorder. Per , no seizure prior to last 15 months. History of alcohol use disorder with daily alcohol use, but reports not drinking day of seizure. Seizure possibly alcohol withdrawal related vs. baseline seizure disorder. Awaiting Keppra level results. Per pharmacy, Keppra last picked up 03/2022. Epilepsy monitoring on vEEG. Per epilepsy increase Keppra to 750mg QD.     # Increased anion gap metabolic acidosis.   Resolved. AG 24 w/ bicarb 19 on admission likely 2/2 starvation ketosis as elevated BHB and normal lactate. No osmolar gap. AG 12 on 7/28/22    # Hyponatremia.   Resolved. Na 127 on admission with decreased serum osmolality. Likely poor PO intake in setting of alcohol use. AAOx3. Repeat labs Na 136.     # Anemia.   Hg 10.7 at admission w/ normal MCV. Likely in setting in decreased PO intake vs. alcohol use. f/u iron studies and folate, B12- awaiting results.     # Hypertension.   Hx of HTN. /95 on admission. Currently Normotensive. At home is on clonidine PO 1 mg BID, hydralazine PO 25mg BID with poor adherence.    Patient was discharged to: home with home PT recs     New medications:     Changes to old medications:    Medications that were stopped:    Items to follow up as outpatient:  - Keppra 750mg   - Follow up with outpatient neurologist     Physical exam at the time of discharge:  T(C): 36.7 (07-28-22 @ 20:57), Max: 36.8 (07-28-22 @ 08:42)  HR: 60 (07-28-22 @ 20:57) (59 - 73)  BP: 117/76 (07-28-22 @ 20:57) (117/76 - 144/83)  RR: 16 (07-28-22 @ 20:57) (16 - 18)  SpO2: 99% (07-28-22 @ 20:57) (98% - 99%)    CONSTITUTIONAL: Anxious and agitated, in some distress   EYES: PERRLA and symmetric  ENMT: Oral mucosa with moist membranes  NECK: Supple, symmetric and without tracheal deviation; thyroid gland not enlarged and without palpable masses  RESPIRATORY: No respiratory distress, no use of accessory muscles; CTA b/l, no wheezes, rales or rhonchi, no dullness or hyperresonance to percussion, no tactile fremitus, no subcutaneous emphysema  CARDIOVASCULAR: RRR, +S1S2, no murmurs, no rubs, no gallops; no JVD; no peripheral edema  GASTROINTESTINAL: Soft, non tender, non distended, no rebound, no guarding  MUSCULOSKELETAL: Normal gait and station, however history of unsteady gait   SKIN: No rashes or ulcers noted; no subcutaneous nodules or induration palpable  NEUROLOGIC: CN II-XII intact  PSYCHIATRIC: A+O x 3, but not linear thought, confused and trying to leave, mood and affect not appropriate, has trouble remembering recent events    #Discharge: do not delete    Patient is 55 yo F with past medical history of HTN, alcohol use disorder, recent seizure disorder presented with two seizures, found to have seizures that occurred on 7/26 c/b additional seizure in the ED.    Hospital course (by problem):   # Alcohol withdrawal.   Pt with alcohol use disorder as per  who states they find bottles of vodka in garbage that belongs to pt. Suspected last drink 7/25. Negative CT head and no FND on exam. Blood alcohol <10. Pt afebrile, no leukocytosis. Received 2mg ativan in ED. On exam in ED, pt mildly agitated and anxious in bed, non combative but needing redirection. AAOx3. 7/28/22, patient was AAOx3, non combative but needing redirection, not linear stories. Recieved thiamine IV 500mg. Started MV and folate. CIWA 0 on 7/28 am, CIWA 11 overnight on 7/28-7/29 received 5mg Diazepam. Awaiting folate and vit B12 levels.    #Seizure.    Pt with recent seizure + witness seizure in ED noted to have generalized tonic clonic shaking w/ foaming at mouth. Lasted 1-2 minutes. Uncertain if history of seizure disorder. Per , no seizure prior to last 15 months. History of alcohol use disorder with daily alcohol use, but reports not drinking day of seizure. Seizure possibly alcohol withdrawal related vs. baseline seizure disorder. Awaiting Keppra level results. Per pharmacy, Keppra last picked up 03/2022. Epilepsy monitoring on vEEG. Per epilepsy increase Keppra to 750mg QD.     # Increased anion gap metabolic acidosis.   Resolved. AG 24 w/ bicarb 19 on admission likely 2/2 starvation ketosis as elevated BHB and normal lactate. No osmolar gap. AG 12 on 7/28/22    # Hyponatremia.   Resolved. Na 127 on admission with decreased serum osmolality. Likely poor PO intake in setting of alcohol use. AAOx3. Repeat labs Na 136.     # Anemia.   Hg 10.7 at admission w/ normal MCV. Likely in setting in decreased PO intake vs. alcohol use. f/u iron studies and folate, B12- awaiting results.     # Hypertension.   Hx of HTN. /95 on admission. Currently Normotensive. At home is on clonidine PO 1 mg BID, hydralazine PO 25mg BID with poor adherence.    Patient was discharged to: home with home PT recs     New medications:     Changes to old medications:  - Keppra 750mg ER QD    Medications that were stopped:    Items to follow up as outpatient:    - Follow up with outpatient neurologist     Physical exam at the time of discharge:  T(C): 36.7 (07-28-22 @ 20:57), Max: 36.8 (07-28-22 @ 08:42)  HR: 60 (07-28-22 @ 20:57) (59 - 73)  BP: 117/76 (07-28-22 @ 20:57) (117/76 - 144/83)  RR: 16 (07-28-22 @ 20:57) (16 - 18)  SpO2: 99% (07-28-22 @ 20:57) (98% - 99%)    CONSTITUTIONAL: Anxious and agitated, in some distress   EYES: PERRLA and symmetric  ENMT: Oral mucosa with moist membranes  NECK: Supple, symmetric and without tracheal deviation; thyroid gland not enlarged and without palpable masses  RESPIRATORY: No respiratory distress, no use of accessory muscles; CTA b/l, no wheezes, rales or rhonchi, no dullness or hyperresonance to percussion, no tactile fremitus, no subcutaneous emphysema  CARDIOVASCULAR: RRR, +S1S2, no murmurs, no rubs, no gallops; no JVD; no peripheral edema  GASTROINTESTINAL: Soft, non tender, non distended, no rebound, no guarding  MUSCULOSKELETAL: Normal gait and station, however history of unsteady gait   SKIN: No rashes or ulcers noted; no subcutaneous nodules or induration palpable  NEUROLOGIC: CN II-XII intact  PSYCHIATRIC: A+O x 3, but not linear thought, confused and trying to leave, mood and affect not appropriate, has trouble remembering recent events    #Discharge: do not delete    Patient is 55 yo F with past medical history of HTN, alcohol use disorder, recent seizure disorder presented with two seizures, found to have seizures that occurred on 7/26 c/b additional seizure in the ED.    Hospital course (by problem):   # Alcohol withdrawal.   Pt with alcohol use disorder as per  who states they find bottles of vodka in garbage that belongs to pt. Suspected last drink 7/25. Negative CT head and no FND on exam. Blood alcohol <10. Pt afebrile, no leukocytosis. Received 2mg ativan in ED. On exam in ED, pt mildly agitated and anxious in bed, non combative but needing redirection. AAOx3. 7/28/22, patient was AAOx3, non combative but needing redirection, not linear stories. Recieved thiamine IV 500mg. Started MV and folate. CIWA 0 on 7/28 am, CIWA 11 overnight on 7/28-7/29 received 5mg Diazepam. Awaiting folate and vit B12 levels.    #Seizure.    Pt with recent seizure + witness seizure in ED noted to have generalized tonic clonic shaking w/ foaming at mouth. Lasted 1-2 minutes. Uncertain if history of seizure disorder. Per , no seizure prior to last 15 months. History of alcohol use disorder with daily alcohol use, but reports not drinking day of seizure. Seizure possibly alcohol withdrawal related vs. baseline seizure disorder. Awaiting Keppra level results. Per pharmacy, Keppra last picked up 03/2022. Epilepsy monitoring on vEEG. Per epilepsy increase Keppra to 750mg QD. Discharged on _________.    # Increased anion gap metabolic acidosis.   Resolved. AG 24 w/ bicarb 19 on admission likely 2/2 starvation ketosis as elevated BHB and normal lactate. No osmolar gap. AG 12 on 7/28/22    # Hyponatremia.   Resolved. Na 127 on admission with decreased serum osmolality. Likely poor PO intake in setting of alcohol use. AAOx3. Repeat labs Na 136.     # Anemia.   Hg 10.7 at admission w/ normal MCV. Likely in setting in decreased PO intake vs. alcohol use. f/u iron studies and folate, B12- awaiting results.     # Hypertension.   Hx of HTN. /95 on admission. Currently Normotensive. At home is on clonidine PO 1 mg BID, hydralazine PO 25mg BID with poor adherence.    Patient was discharged to: home with home PT recs     New medications:   -     Changes to old medications:  - Keppra 750mg ER QD    Medications that were stopped:    Items to follow up as outpatient:    - Follow up with outpatient neurologist     Physical exam at the time of discharge:  T(C): 36.7 (07-28-22 @ 20:57), Max: 36.8 (07-28-22 @ 08:42)  HR: 60 (07-28-22 @ 20:57) (59 - 73)  BP: 117/76 (07-28-22 @ 20:57) (117/76 - 144/83)  RR: 16 (07-28-22 @ 20:57) (16 - 18)  SpO2: 99% (07-28-22 @ 20:57) (98% - 99%)    CONSTITUTIONAL: Anxious and agitated, in some distress   EYES: PERRLA and symmetric  ENMT: Oral mucosa with moist membranes  NECK: Supple, symmetric and without tracheal deviation; thyroid gland not enlarged and without palpable masses  RESPIRATORY: No respiratory distress, no use of accessory muscles; CTA b/l, no wheezes, rales or rhonchi, no dullness or hyperresonance to percussion, no tactile fremitus, no subcutaneous emphysema  CARDIOVASCULAR: RRR, +S1S2, no murmurs, no rubs, no gallops; no JVD; no peripheral edema  GASTROINTESTINAL: Soft, non tender, non distended, no rebound, no guarding  MUSCULOSKELETAL: Normal gait and station, however history of unsteady gait   SKIN: No rashes or ulcers noted; no subcutaneous nodules or induration palpable  NEUROLOGIC: CN II-XII intact  PSYCHIATRIC: A+O x 3, but not linear thought, confused and trying to leave, mood and affect not appropriate, has trouble remembering recent events    #Discharge: do not delete    Patient is 55 yo F with past medical history of HTN, alcohol use disorder, recent seizure disorder presented with two seizures, found to have seizures that occurred on 7/26 c/b additional seizure in the ED.    Hospital course (by problem):   # Alcohol withdrawal.   Pt with alcohol use disorder as per  who states they find bottles of vodka in garbage that belongs to pt. Suspected last drink 7/25. Negative CT head and no FND on exam. Blood alcohol <10. Pt afebrile, no leukocytosis. Received 2mg ativan in ED. On exam in ED, pt mildly agitated and anxious in bed, non combative but needing redirection. AAOx3. 7/28/22, patient was AAOx3, non combative but needing redirection, not linear stories. Recieved thiamine IV 500mg. Started MV and folate. CIWA 0 on 7/28 am, CIWA 11 overnight on 7/28-7/29 received 5mg Diazepam. Awaiting folate and vit B12 levels.    #Seizure.    Pt with recent seizure + witness seizure in ED noted to have generalized tonic clonic shaking w/ foaming at mouth. Lasted 1-2 minutes. Uncertain if history of seizure disorder. Per , no seizure prior to last 15 months. History of alcohol use disorder with daily alcohol use, but reports not drinking day of seizure. Seizure possibly alcohol withdrawal related vs. baseline seizure disorder. Awaiting Keppra level results. Per pharmacy, Keppra last picked up 03/2022. Epilepsy monitoring on vEEG. Per epilepsy increase Keppra to 750mg QD and add Phenobarbital 32.4mg QHS. Discharged on keppra 750mg QD and Phenobarbital 32.4mg QHS.     # Increased anion gap metabolic acidosis.   Resolved. AG 24 w/ bicarb 19 on admission likely 2/2 starvation ketosis as elevated BHB and normal lactate. No osmolar gap. AG 12 on 7/28/22    # Hyponatremia.   Resolved. Na 127 on admission with decreased serum osmolality. Likely poor PO intake in setting of alcohol use. AAOx3. Repeat labs Na 136.     # Anemia.   Hg 10.7 at admission w/ normal MCV. Likely in setting in decreased PO intake vs. alcohol use. f/u iron studies and folate, B12- awaiting results.     # Hypertension.   Hx of HTN. /95 on admission. Currently Normotensive. At home is on clonidine PO 1 mg BID, hydralazine PO 25mg BID with poor adherence.    Patient was discharged to: home with home PT recs     New medications:   - Phenobarbital 32.4mg QHS     Changes to old medications:  - Keppra 750mg ER QD    Medications that were stopped:    Items to follow up as outpatient:    - Follow up with outpatient neurologist, 4-6 weeks  - Follow up with outpatient PCP, 1-2 weeks     Physical exam at the time of discharge:  T(C): 36.7 (07-28-22 @ 20:57), Max: 36.8 (07-28-22 @ 08:42)  HR: 60 (07-28-22 @ 20:57) (59 - 73)  BP: 117/76 (07-28-22 @ 20:57) (117/76 - 144/83)  RR: 16 (07-28-22 @ 20:57) (16 - 18)  SpO2: 99% (07-28-22 @ 20:57) (98% - 99%)    CONSTITUTIONAL: Anxious and agitated, in some distress   EYES: PERRLA and symmetric  ENMT: Oral mucosa with moist membranes  NECK: Supple, symmetric and without tracheal deviation; thyroid gland not enlarged and without palpable masses  RESPIRATORY: No respiratory distress, no use of accessory muscles; CTA b/l, no wheezes, rales or rhonchi, no dullness or hyperresonance to percussion, no tactile fremitus, no subcutaneous emphysema  CARDIOVASCULAR: RRR, +S1S2, no murmurs, no rubs, no gallops; no JVD; no peripheral edema  GASTROINTESTINAL: Soft, non tender, non distended, no rebound, no guarding  MUSCULOSKELETAL: Normal gait and station, however history of unsteady gait   SKIN: No rashes or ulcers noted; no subcutaneous nodules or induration palpable  NEUROLOGIC: CN II-XII intact  PSYCHIATRIC: A+O x 3, but not linear thought, confused and trying to leave, mood and affect not appropriate, has trouble remembering recent events    Patient is 57 yo F with past medical history of HTN, alcohol use disorder, recent seizure disorder presented with two seizures, found to have seizures that occurred on 7/26 c/b additional seizure in the ED    Hospital course (by problem):     #Seizure   Pt with recent seizure + witness seizure in ED noted to have generalized tonic clonic shaking w/ foaming at mouth. Lasted 1-2 minutes. Uncertain if history of seizure disorder. Per , no seizure prior to last 15 months. History of alcohol use disorder with daily alcohol use, but reports not drinking day of seizure. Seizure possibly alcohol withdrawal related vs. baseline seizure disorder. Awaiting Keppra level results, will report levels to pt as it is send out lab. Per pharmacy, Keppra last picked up 03/2022. vEEG demonstrated no seizure activity. Epilepsy followed with recs as below   -Keppra to 750mg QD  -Phenobarbital 32.4mg QHS    # Alcohol withdrawal  Pt with alcohol use disorder as per  who states they find bottles of vodka in garbage that belongs to pt. Suspected last drink 7/25. Negative CT head and no FND on exam. Blood alcohol <10. Pt afebrile, no leukocytosis. Received 2mg ativan in ED. On exam in ED, pt mildly agitated and anxious in bed, non combative but needing redirection. AAOx3. 7/28/22, patient was AAOx3, non combative but needing redirection, not linear stories. Recieved thiamine IV 500mg. Started MV and folate. CIWA 0 at time of discharge. Folate/B12 WNL   -Thiamine 1 g PO QD   -Counseling provided to alcohol cessation    # Hypertension.   Hx of HTN. /95 on admission. Currently Normotensive. At home is on clonidine PO 1 mg BID, hydralazine PO 25mg BID with poor adherence.  -C/w home medications     Patient was discharged to: home with home PT recs     New medications:   - Phenobarbital 32.4mg QHS   -Thiamine 100 mg PO QD     Changes to old medications:  - Keppra 750mg ER QD (increased from 500 mg QD)     Medications that were stopped:    Items to follow up as outpatient:    - Follow up with outpatient neurologist, 4-6 weeks (office will notify)   - Follow up with outpatient PCP, 1-2 weeks (office will notify). PLEASE ENSURE KEPPRA AND PHENOBARBITAL LEVELS ARE DRAWN AT OUTPATIENT PCP APPT.     Physical exam at the time of discharge:  T(C): 36.7 (07-28-22 @ 20:57), Max: 36.8 (07-28-22 @ 08:42)  HR: 60 (07-28-22 @ 20:57) (59 - 73)  BP: 117/76 (07-28-22 @ 20:57) (117/76 - 144/83)  RR: 16 (07-28-22 @ 20:57) (16 - 18)  SpO2: 99% (07-28-22 @ 20:57) (98% - 99%)    CONSTITUTIONAL: Anxious and agitated, in some distress   EYES: PERRLA and symmetric  ENMT: Oral mucosa with moist membranes  NECK: Supple, symmetric and without tracheal deviation; thyroid gland not enlarged and without palpable masses  RESPIRATORY: No respiratory distress, no use of accessory muscles; CTA b/l, no wheezes, rales or rhonchi, no dullness or hyperresonance to percussion, no tactile fremitus, no subcutaneous emphysema  CARDIOVASCULAR: RRR, +S1S2, no murmurs, no rubs, no gallops; no JVD; no peripheral edema  GASTROINTESTINAL: Soft, non tender, non distended, no rebound, no guarding  MUSCULOSKELETAL: Normal gait and station, however history of unsteady gait   SKIN: No rashes or ulcers noted; no subcutaneous nodules or induration palpable  NEUROLOGIC: CN II-XII intact  PSYCHIATRIC: A+O x 3, but not linear thought, confused and trying to leave, mood and affect not appropriate, has trouble remembering recent events

## 2022-07-29 NOTE — DISCHARGE NOTE PROVIDER - CARE PROVIDERS DIRECT ADDRESSES
,[email or web address]

## 2022-07-29 NOTE — DISCHARGE NOTE PROVIDER - PROVIDER TOKENS
PROVIDER:[TOKEN:[[id number]],FOLLOWUP:[2 weeks]] PROVIDER:[TOKEN:[[id number]],FOLLOWUP:[2 weeks]],PROVIDER:[TOKEN:[[id number]],FOLLOWUP:[1 month]] PROVIDER:[TOKEN:[[id number]],SCHEDULEDAPPT:[08/12/2022],SCHEDULEDAPPTTIME:[11:00 AM]],PROVIDER:[TOKEN:[[id number]],FOLLOWUP:[1 month]]

## 2022-07-29 NOTE — DISCHARGE NOTE PROVIDER - NSDCCPCAREPLAN_GEN_ALL_CORE_FT
PRINCIPAL DISCHARGE DIAGNOSIS  Diagnosis: Seizure  Assessment and Plan of Treatment: You have a brain condition where you are prone to getting seizures. Seizures are dangerous because they can cause you to stop breathing, cause permanent damage to your brain, and puts you at risk for falls and environmental hazards.Please continue taking your medication of 750mg Keppra. Call 911 or seek immediate care if your seizure lasts longer than 5 minutes, have trouble breathing, have a second seizure within 24 hours of your first, or if you are injured during a seizure. Please follow with your neurologist, to be evaluated for your seizures.        SECONDARY DISCHARGE DIAGNOSES  Diagnosis: Alcohol withdrawal  Assessment and Plan of Treatment: You have a history of alcohol dependence or alcoholism. Alcohol abuse can be dangerous to your liver and you may experience alcohol withdrawal if you stop drinking suddenly. Common symptoms of alcohol withdrawal include shaking/tremors, vomiting, feelings of severe anxiety/agitation, sweating, fast heart rate, and sometimes hallucinations. Please follow up closely with your primary care physicia to be properly monitored and treated. Please seek care immediately or return to the emergency department if you have symptoms such a but not limited to: severe convulsions, difficulty breathing, chest pain, and/or hallucinations.      Diagnosis: Hypertension  Assessment and Plan of Treatment: You have a known history of high blood pressure prior to your admission.  High blood pressure can cause damage to your heart and kidneys and increases your risk of heart attack and stroke.   Your blood pressure was normal in the hospital so we did not give you your blood pressure medication. However, you are prescribed Clonidine and Hydralazin outpatient. Please take this medication when you are discharged so that you can continue to control your blood pressure. Additionally be sure to follow up with your primary care physician on a regular basis to make sure your blood pressure continues to be well controlled. If you experience symptoms such as but not limited to: sudden onset blurry vision, nausea, vomiting, chest pain, shortness of breath, or palpitations, please go to the nearest emergency room.      Diagnosis: Anemia  Assessment and Plan of Treatment: When you came to the hospital you were found to have anemia. Anemia is the medical term for when a person has too few red blood cells. Red blood cells carry oxygen throughout your body; if you have too few red blood cells, your body is not able to get all the oxygen it needs. Most people with anemia have no symptoms, however common symptoms include: increased fatigue, shortness of breath, tiring easily, and headaches. If you experience any of these symptoms, or have any episodes of bloody vomit or bloody stool (can be red stool or black stool), please see your primary care provider or go to your emergency room for further evaluation.       PRINCIPAL DISCHARGE DIAGNOSIS  Diagnosis: Seizure  Assessment and Plan of Treatment: You have a brain condition where you are prone to getting seizures. Seizures are dangerous because they can cause you to stop breathing, cause permanent damage to your brain, and puts you at risk for falls and environmental hazards.  Please continue taking your medication of 750mg Keppra. Call 911 or seek immediate care if your seizure lasts longer than 5 minutes, have trouble breathing, have a second seizure within 24 hours of your first, or if you are injured during a seizure. Please follow with your neurologist, to be evaluated for your seizures.        SECONDARY DISCHARGE DIAGNOSES  Diagnosis: Alcohol withdrawal  Assessment and Plan of Treatment: You have a history of alcohol dependence or alcoholism. Alcohol abuse can be dangerous to your liver and you may experience alcohol withdrawal if you stop drinking suddenly.   Common symptoms of alcohol withdrawal include shaking/tremors, vomiting, feelings of severe anxiety/agitation, sweating, fast heart rate, and sometimes hallucinations. Please follow up closely with your primary care physicia to be properly monitored and treated. Please seek care immediately or return to the emergency department if you have symptoms such a but not limited to: severe convulsions, difficulty breathing, chest pain, and/or hallucinations.      Diagnosis: Hypertension  Assessment and Plan of Treatment: You have a known history of high blood pressure prior to your admission.  High blood pressure can cause damage to your heart and kidneys and increases your risk of heart attack and stroke.   Your blood pressure was normal in the hospital so we did not give you your blood pressure medication. However, you are prescribed Clonidine and Hydralazine outpatient. Please follow up with your primary care provider outpatient. Additionally be sure to follow up with your primary care physician on a regular basis to make sure your blood pressure continues to be well controlled. If you experience symptoms such as but not limited to: sudden onset blurry vision, nausea, vomiting, chest pain, shortness of breath, or palpitations, please go to the nearest emergency room.      Diagnosis: Anemia  Assessment and Plan of Treatment: When you came to the hospital you were found to have anemia. Anemia is the medical term for when a person has too few red blood cells. Red blood cells carry oxygen throughout your body; if you have too few red blood cells, your body is not able to get all the oxygen it needs. Most people with anemia have no symptoms, however common symptoms include: increased fatigue, shortness of breath, tiring easily, and headaches. If you experience any of these symptoms, or have any episodes of bloody vomit or bloody stool (can be red stool or black stool), please see your primary care provider or go to your emergency room for further evaluation.       PRINCIPAL DISCHARGE DIAGNOSIS  Diagnosis: Seizure  Assessment and Plan of Treatment: You have a brain condition where you are prone to getting seizures. Seizures are dangerous because they can cause you to stop breathing, cause permanent damage to your brain, and puts you at risk for falls and environmental hazards.  Please continue taking your medication of _______. Call 911 or seek immediate care if your seizure lasts longer than 5 minutes, have trouble breathing, have a second seizure within 24 hours of your first, or if you are injured during a seizure. Please follow with your neurologist, to be evaluated for your seizures.        SECONDARY DISCHARGE DIAGNOSES  Diagnosis: Alcohol withdrawal  Assessment and Plan of Treatment: You have a history of alcohol dependence or alcoholism. Alcohol abuse can be dangerous to your liver and you may experience alcohol withdrawal if you stop drinking suddenly.   Common symptoms of alcohol withdrawal include shaking/tremors, vomiting, feelings of severe anxiety/agitation, sweating, fast heart rate, and sometimes hallucinations. Please follow up closely with your primary care physicia to be properly monitored and treated. Please seek care immediately or return to the emergency department if you have symptoms such a but not limited to: severe convulsions, difficulty breathing, chest pain, and/or hallucinations.      Diagnosis: Hypertension  Assessment and Plan of Treatment: You have a known history of high blood pressure prior to your admission.  High blood pressure can cause damage to your heart and kidneys and increases your risk of heart attack and stroke.   Your blood pressure was normal in the hospital so we did not give you your blood pressure medication. However, you are prescribed Clonidine and Hydralazine outpatient. Please follow up with your primary care provider outpatient. Additionally be sure to follow up with your primary care physician on a regular basis to make sure your blood pressure continues to be well controlled. If you experience symptoms such as but not limited to: sudden onset blurry vision, nausea, vomiting, chest pain, shortness of breath, or palpitations, please go to the nearest emergency room.      Diagnosis: Anemia  Assessment and Plan of Treatment: When you came to the hospital you were found to have anemia. Anemia is the medical term for when a person has too few red blood cells. Red blood cells carry oxygen throughout your body; if you have too few red blood cells, your body is not able to get all the oxygen it needs. Most people with anemia have no symptoms, however common symptoms include: increased fatigue, shortness of breath, tiring easily, and headaches. If you experience any of these symptoms, or have any episodes of bloody vomit or bloody stool (can be red stool or black stool), please see your primary care provider or go to your emergency room for further evaluation.       PRINCIPAL DISCHARGE DIAGNOSIS  Diagnosis: Seizure  Assessment and Plan of Treatment: You have a brain condition where you are prone to getting seizures. Seizures are dangerous because they can cause you to stop breathing, cause permanent damage to your brain, and puts you at risk for falls and environmental hazards.  Please continue taking your medications of Keppra and your new medication, Phenobarbital everyday. You will hear from the neurology team to schedule an outpatient follow-up.  1Call 911 or seek immediate care if your seizure lasts longer than 5 minutes, have trouble breathing, have a second seizure within 24 hours of your first, or if you are injured during a seizure. Please follow with your neurologist, to be evaluated for your seizures.        SECONDARY DISCHARGE DIAGNOSES  Diagnosis: Alcohol withdrawal  Assessment and Plan of Treatment: You have a history of alcohol dependence or alcoholism. Alcohol abuse can be dangerous to your liver and you may experience alcohol withdrawal if you stop drinking suddenly.   Common symptoms of alcohol withdrawal include shaking/tremors, vomiting, feelings of severe anxiety/agitation, sweating, fast heart rate, and sometimes hallucinations. Please follow up closely with your primary care physicia to be properly monitored and treated. Please seek care immediately or return to the emergency department if you have symptoms such a but not limited to: severe convulsions, difficulty breathing, chest pain, and/or hallucinations.      Diagnosis: Hypertension  Assessment and Plan of Treatment: You have a known history of high blood pressure prior to your admission.  High blood pressure can cause damage to your heart and kidneys and increases your risk of heart attack and stroke.   Your blood pressure was normal in the hospital so we did not give you your blood pressure medication. However, you are prescribed Clonidine and Hydralazine outpatient. Please follow up with your primary care provider outpatient. Additionally be sure to follow up with your primary care physician on a regular basis to make sure your blood pressure continues to be well controlled. If you experience symptoms such as but not limited to: sudden onset blurry vision, nausea, vomiting, chest pain, shortness of breath, or palpitations, please go to the nearest emergency room.      Diagnosis: Anemia  Assessment and Plan of Treatment: When you came to the hospital you were found to have anemia. Anemia is the medical term for when a person has too few red blood cells. Red blood cells carry oxygen throughout your body; if you have too few red blood cells, your body is not able to get all the oxygen it needs. Most people with anemia have no symptoms, however common symptoms include: increased fatigue, shortness of breath, tiring easily, and headaches. If you experience any of these symptoms, or have any episodes of bloody vomit or bloody stool (can be red stool or black stool), please see your primary care provider or go to your emergency room for further evaluation.       PRINCIPAL DISCHARGE DIAGNOSIS  Diagnosis: Seizure  Assessment and Plan of Treatment: You have a brain condition where you are prone to getting seizures. Seizures are dangerous because they can cause you to stop breathing, cause permanent damage to your brain, and puts you at risk for falls and environmental hazards.  Please continue taking your medications of Keppra and your new medication, Phenobarbital everyday. You will hear from the neurology team to schedule an outpatient follow-up.  Call 911 or seek immediate care if your seizure lasts longer than 5 minutes, have trouble breathing, have a second seizure within 24 hours of your first, or if you are injured during a seizure. Please follow with your neurologist, to be evaluated for your seizures. They will call you to schedule an appt within 4-6 weeks. You will also have a PCP appt within the next 2 weeks to get your blood levels checked for Keppra and Phenobarbital.         SECONDARY DISCHARGE DIAGNOSES  Diagnosis: Alcohol withdrawal  Assessment and Plan of Treatment: You have a history of alcohol dependence or alcoholism. Alcohol abuse can be dangerous to your liver and you may experience alcohol withdrawal if you stop drinking suddenly.   Common symptoms of alcohol withdrawal include shaking/tremors, vomiting, feelings of severe anxiety/agitation, sweating, fast heart rate, and sometimes hallucinations. Please follow up closely with your primary care physicia to be properly monitored and treated. Please seek care immediately or return to the emergency department if you have symptoms such a but not limited to: severe convulsions, difficulty breathing, chest pain, and/or hallucinations.      Diagnosis: Hypertension  Assessment and Plan of Treatment: You have a known history of high blood pressure prior to your admission.  High blood pressure can cause damage to your heart and kidneys and increases your risk of heart attack and stroke.   Your blood pressure was normal in the hospital so we did not give you your blood pressure medication. However, you are prescribed Clonidine and Hydralazine outpatient. Please follow up with your primary care provider outpatient. Additionally be sure to follow up with your primary care physician on a regular basis to make sure your blood pressure continues to be well controlled. If you experience symptoms such as but not limited to: sudden onset blurry vision, nausea, vomiting, chest pain, shortness of breath, or palpitations, please go to the nearest emergency room.      Diagnosis: Anemia  Assessment and Plan of Treatment: When you came to the hospital you were found to have anemia. Anemia is the medical term for when a person has too few red blood cells. Red blood cells carry oxygen throughout your body; if you have too few red blood cells, your body is not able to get all the oxygen it needs. Most people with anemia have no symptoms, however common symptoms include: increased fatigue, shortness of breath, tiring easily, and headaches. If you experience any of these symptoms, or have any episodes of bloody vomit or bloody stool (can be red stool or black stool), please see your primary care provider or go to your emergency room for further evaluation.

## 2022-07-29 NOTE — DISCHARGE NOTE PROVIDER - NSDCFUADDAPPT_GEN_ALL_CORE_FT
Please follow up with your primary care provider outpatient.  Please follow up with your neurologist outpatient.  Someone from Dr. Winkler's office will call to schedule a follow-up with you in 1-2 weeks.   Please follow up with your neurologist in 1-2 weeks.  Someone from Dr. Winkler's office will call to schedule a follow-up with you in 1-2 weeks    Someone from the Neurology epilepsy team will call you in 4-6 weeks for follow-up  (1) Someone from the Neurology epilepsy team will call you in 4-6 weeks for follow-up     (2) Please follow up with [facility] at [address] [place] [address], [place] [address] on 08/12/2022 at 11:00am.    Appointment was scheduled by Ms. TAQUERIA Wolfe, Referral Coordinator.

## 2022-07-29 NOTE — DISCHARGE NOTE NURSING/CASE MANAGEMENT/SOCIAL WORK - NSDCFUADDAPPT_GEN_ALL_CORE_FT
(1) Someone from the Neurology epilepsy team will call you in 4-6 weeks for follow-up     (2) Please follow up with [facility] at [address] [place] [address], [place] [address] on 08/12/2022 at 11:00am.    Appointment was scheduled by Ms. TAQUERIA Wolfe, Referral Coordinator.

## 2022-07-29 NOTE — DISCHARGE NOTE PROVIDER - NSDCHHPTASSISTHOME_GEN_ALL_CORE
Alert-The patient is alert, awake and responds to voice. The patient is oriented to time, place, and person. The triage nurse is able to obtain subjective information.
Patient Needs Assistance to Leave Residence...

## 2022-07-29 NOTE — PROGRESS NOTE ADULT - SUBJECTIVE AND OBJECTIVE BOX
Patient was seen and examined at bedside. Case discuss with resident. Pt was upset this morning and wanted to leave. Pt removed the EEG.     OBJECTIVE:  Vital Signs Last 24 Hrs  T(C): 37.3 (29 Jul 2022 09:40), Max: 37.3 (29 Jul 2022 09:40)  T(F): 99.1 (29 Jul 2022 09:40), Max: 99.1 (29 Jul 2022 09:40)  HR: 70 (29 Jul 2022 09:40) (60 - 73)  BP: 154/82 (29 Jul 2022 09:40) (117/76 - 154/82)  BP(mean): --  RR: 18 (29 Jul 2022 09:40) (16 - 18)  SpO2: 99% (29 Jul 2022 09:40) (98% - 99%)    PHYSICAL EXAM:  NAD laying in bed  CTA b/l no wheezing or crackles  NL S1,S2 no mumurs      LABS:                        9.6    7.79  )-----------( 236      ( 29 Jul 2022 08:02 )             30.7     07-29    131<L>  |  94<L>  |  8   ----------------------------<  117<H>  4.7   |  27  |  0.39<L>    Ca    10.3      29 Jul 2022 08:02  Phos  3.4     07-29  Mg     1.7     07-29    acetaminophen     Tablet .. 650 milliGRAM(s) Oral every 6 hours PRN  diazepam  Injectable 5 milliGRAM(s) IV Push every 1 hour PRN  enoxaparin Injectable 40 milliGRAM(s) SubCutaneous every 24 hours  levETIRAcetam 750 milliGRAM(s) Oral daily      A/P: 55 y/o female with PMH of HTN, alcohol use disorder, and recent seizure-like activities (1/2022) who was admitted on 7/27/22 for a seizure like event and another episode in ED.   #Seizure  - Pt did not fully complete the vEEG however the recording that we were able to obtain did not contain any seizure like activity.  - Epilepsy following per epilepsy recommendation the pt was started on Phenobarbitol  - Continue Keppra  -Pt to f/u in Epilepsy clinic     #Alcohol Use  - Will continue CIWA     #HTN  -Will continue to monitor BP    #DISPO  -Will likely d/c today with outpt Epilepsy and medicine f/u

## 2022-07-29 NOTE — DISCHARGE NOTE PROVIDER - CARE PROVIDER_API CALL
Lolis Winkler)  Internal Medicine  [address], [address] [place] [address]  Phone: [phone]  Fax: [phone]  Follow Up Time: 2 weeks   Lolis Winkler)  Internal Medicine  [address], [address] [place] [address]  Phone: [phone]  Fax: [phone]  Follow Up Time: 2 weeks    Brandt Johns)  Clinical Neurophysiology; EEGEpilepsy; Neurology; Sleep Medicine  [address] [address]  Phone: [phone]  Fax: [phone]  Follow Up Time: 1 month   Lolis Winkler)  Internal Medicine  [address], [address] [place] [address]  Phone: [phone]  Fax: [phone]  Scheduled Appointment: 08/12/2022 11:00 AM    Brandt Johns)  Clinical Neurophysiology; EEGEpilepsy; Neurology; Sleep Medicine  [address] [address]  Phone: [phone]  Fax: [phone]  Follow Up Time: 1 month

## 2022-07-29 NOTE — PROGRESS NOTE ADULT - SUBJECTIVE AND OBJECTIVE BOX
EPILEPSY PROGRESS NOTE:   Patient seen and examined at bedside, and was eager to be discharged. Report that she has a lunch date, and prefer adjusted meds to occur outpatient. There were no report of seizures overnight.    MEDICATIONS  (STANDING):  enoxaparin Injectable 40 milliGRAM(s) SubCutaneous every 24 hours  levETIRAcetam 750 milliGRAM(s) Oral daily    MEDICATIONS  (PRN):  acetaminophen     Tablet .. 650 milliGRAM(s) Oral every 6 hours PRN Temp greater or equal to 38C (100.4F), Mild Pain (1 - 3)  diazepam  Injectable 5 milliGRAM(s) IV Push every 1 hour PRN CIWA score >8    VITAL SIGNS:  T(C): 37.3 (07-29-22 @ 09:40), Max: 37.3 (07-29-22 @ 09:40)  HR: 70 (07-29-22 @ 09:40) (60 - 73)  BP: 154/82 (07-29-22 @ 09:40) (117/76 - 154/82)  RR: 18 (07-29-22 @ 09:40) (16 - 18)  SpO2: 99% (07-29-22 @ 09:40) (98% - 99%)  Wt(kg): --    PHYSICAL EXAM:  Alert, and sitting up in bed. Oriented to name, and date. Disoriented to place. Speech fluent but pressured, no aphasia or dysarthria.  Moving all limbs equally in bed.     LABS:  CBC Full  -  ( 29 Jul 2022 08:02 )  WBC Count : 7.79 K/uL  RBC Count : 3.32 M/uL  Hemoglobin : 9.6 g/dL  Hematocrit : 30.7 %  Platelet Count - Automated : 236 K/uL  Mean Cell Volume : 92.5 fl  Mean Cell Hemoglobin : 28.9 pg  Mean Cell Hemoglobin Concentration : 31.3 gm/dL  Auto Neutrophil # : 5.24 K/uL  Auto Lymphocyte # : 1.11 K/uL  Auto Monocyte # : 1.30 K/uL  Auto Eosinophil # : 0.04 K/uL  Auto Basophil # : 0.06 K/uL  Auto Neutrophil % : 67.3 %  Auto Lymphocyte % : 14.2 %  Auto Monocyte % : 16.7 %  Auto Eosinophil % : 0.5 %  Auto Basophil % : 0.8 %    07-29    131<L>  |  94<L>  |  8   ----------------------------<  117<H>  4.7   |  27  |  0.39<L>    Ca    10.3      29 Jul 2022 08:02  Phos  3.4     07-29  Mg     1.7     07-29       EPILEPSY PROGRESS NOTE:   Patient seen and examined at bedside, and was eager to be discharged. Report that she has a lunch date, and prefer adjusting meds to occur outpatient, rather than waiting.  There were no report of seizures overnight.  This morning she seemed a bit more disoriented than yesterday.    MEDICATIONS  (STANDING):  enoxaparin Injectable 40 milliGRAM(s) SubCutaneous every 24 hours  levETIRAcetam 750 milliGRAM(s) Oral daily    MEDICATIONS  (PRN):  acetaminophen     Tablet .. 650 milliGRAM(s) Oral every 6 hours PRN Temp greater or equal to 38C (100.4F), Mild Pain (1 - 3)  diazepam  Injectable 5 milliGRAM(s) IV Push every 1 hour PRN CIWA score >8    VITAL SIGNS:  T(C): 37.3 (07-29-22 @ 09:40), Max: 37.3 (07-29-22 @ 09:40)  HR: 70 (07-29-22 @ 09:40) (60 - 73)  BP: 154/82 (07-29-22 @ 09:40) (117/76 - 154/82)  RR: 18 (07-29-22 @ 09:40) (16 - 18)  SpO2: 99% (07-29-22 @ 09:40) (98% - 99%)  Wt(kg): --    PHYSICAL EXAM:  Alert, and sitting up in bed. Oriented to name, and date. Disoriented to place. Speech fluent but pressured, no aphasia or dysarthria.  Moving all limbs equally in bed.     LABS:  CBC Full  -  ( 29 Jul 2022 08:02 )  WBC Count : 7.79 K/uL  RBC Count : 3.32 M/uL  Hemoglobin : 9.6 g/dL  Hematocrit : 30.7 %  Platelet Count - Automated : 236 K/uL  Mean Cell Volume : 92.5 fl  Mean Cell Hemoglobin : 28.9 pg  Mean Cell Hemoglobin Concentration : 31.3 gm/dL  Auto Neutrophil # : 5.24 K/uL  Auto Lymphocyte # : 1.11 K/uL  Auto Monocyte # : 1.30 K/uL  Auto Eosinophil # : 0.04 K/uL  Auto Basophil # : 0.06 K/uL  Auto Neutrophil % : 67.3 %  Auto Lymphocyte % : 14.2 %  Auto Monocyte % : 16.7 %  Auto Eosinophil % : 0.5 %  Auto Basophil % : 0.8 %    07-29    131<L>  |  94<L>  |  8   ----------------------------<  117<H>  4.7   |  27  |  0.39<L>    Ca    10.3      29 Jul 2022 08:02  Phos  3.4     07-29  Mg     1.7     07-29

## 2022-07-29 NOTE — PROGRESS NOTE ADULT - ASSESSMENT
57 y/o female with PMH of HTN, alcohol use disorder, and recent seizure-like activities (1/2022) who was admitted on 7/27/22 for a seizure like event and another episode in ED.     #Seizures:  Unclear if seizure events are provoked by ETOH withdrawal vs seizures given CTH w/ no acute pathology, personal hx of events related to ETOH use, and no identifiable seizure risks.   Recommendations:  - Pending Keppra level  - Please initiate Phenobarbital 32.4mg now then order QHS upon discharge to home  - c/w Keppra ER 750mg QD  - Maintain seizure fall and seizure precautions   - F/U w/ Dr Johns in 4 - 6 weeks ( will call patient)    #Balance:  Pt also reports instability due to balance issues with a recent fall, although improving. Unclear if related to alcohol use or some other cause. Given normal gait, negative Romberg, and lack of dysmetria on exam, cerebellar dysfunction less likely. Given intact sensation to touch, vibration, and proprioception throughout, with symmetry b/l, neuropathy less likely.   Recommendations:  - PT recommends outpatient PT 57 y/o female with PMH of HTN, alcohol use disorder, and recent seizure-like activities (1/2022) who was admitted on 7/27/22 for a seizure like event and another episode in ED.     #Seizures:  Unclear if seizure events are provoked by ETOH withdrawal vs seizures given CTH w/ no acute pathology, personal hx of events related to ETOH use, and no identifiable seizure risks.   Recommendations:  - Pending Keppra level  - Please initiate Phenobarbital 32.4mg now then order QHS upon discharge to home  - c/w Keppra ER 750mg QD  - Maintain seizure fall and seizure precautions   - F/U w/ Dr Johns in 4 - 6 weeks ( will call patient)    #Gait disturbance:  Pt also reports instability due to balance issues with a recent fall, although improving. Unclear if related to alcohol use or some other cause. Given normal gait, negative Romberg, and lack of dysmetria on exam, cerebellar dysfunction less likely. Given intact sensation to touch, vibration, and proprioception throughout, with symmetry b/l, neuropathy less likely.   Recommendations:  - PT recommends outpatient PT

## 2022-07-29 NOTE — DISCHARGE NOTE PROVIDER - NSDCMRMEDTOKEN_GEN_ALL_CORE_FT
cloNIDine 0.1 mg oral tablet: 1 tab(s) orally 2 times a day  hydrALAZINE 25 mg oral tablet: 1 tab(s) orally 4 times a day  Outpatient Physical Therapy:    cloNIDine 0.1 mg oral tablet: 1 tab(s) orally 2 times a day  hydrALAZINE 25 mg oral tablet: 1 tab(s) orally 4 times a day  levETIRAcetam 750 mg oral tablet: 1 tab(s) orally once a day  Outpatient Physical Therapy:   PHENobarbital 32.4 mg oral tablet: 1 tab(s) orally once a day (at bedtime) MDD:32.4 mg  Vitamin B1 100 mg oral tablet: 1 tab(s) orally once a day    cloNIDine 0.1 mg oral tablet: 1 tab(s) orally 2 times a day  hydrALAZINE 25 mg oral tablet: 1 tab(s) orally 4 times a day  levETIRAcetam 750 mg oral tablet: 1 tab(s) orally once a day  Outpatient Physical Therapy:   PHENobarbital 32.4 mg oral tablet: 1 tab(s) orally once a day (at bedtime) MDD:32.4 mg  PHENobarbital 32.4 mg oral tablet: 1 tab(s) orally once a day (at bedtime) MDD:1 tablet at bedtime  Vitamin B1 100 mg oral tablet: 1 tab(s) orally once a day

## 2022-07-29 NOTE — EEG REPORT - NS EEG TEXT BOX
[facility] Department of Neurology  Inpatient Continuous video-Electroencephalogram    Patient Name:	MELODY MILLS    :	1965  MRN:	[id number]    Study Start Date/Time:  2022, 9:20:56 AM  Study End Date/Time:	2022, 2:58 PM    Referred by: Dr. Woody Lane    Brief Clinical History:  MELODY MILLS is a 56 year old Female with history of seizures or seizure-like events and frequent high alcohol consumption; study performed to investigate for seizures or markers of epilepsy.  Technologist notes:-    Diagnosis Code:   R56.9 convulsions/seizure  The live video was: monitored continuously by trained technicians.    Pertinent Medications:  LEV XR 750mg daily, lorazepam IV in the ED    Acquisition Details:  Electroencephalography was acquired using a minimum of 21 channels on an [employer] system v 8.5.1 with electrode placement according to the standard International 10-20 system following ACNS (American Clinical Neurophysiology Society) guidelines for Long-Term Video EEG monitoring.  Anterior temporal T1 and T2 electrodes were utilized whenever possible.   The [employer] automated spike & seizure detections were all reviewed in detail, in addition to extensive portions of raw EEG.      Day 1: 2022 @ 9:20:56 AM to next morning @ 07:00 am  Background:  continuous, with predominantly alpha and beta frequencies.  Symmetry:  No persistent asymmetries of voltage or frequency.  Posterior Dominant Rhythm:  12 Hz symmetric, well-organized, and well-modulated.  Organization: Appropriate anterior-posterior gradient.  Voltage:  Normal (20+ uV)  Variability: Yes. 		Reactivity: Yes.  N2 sleep: Symmetric, synchronous spindles and K complexes.  Spontaneous Activity:  No epileptiform discharges.  Periodic/rhythmic activity:  None.  Events:  No electrographic seizures or significant clinical events.  Provocations:  Hyperventilation and Photic stimulation: was not performed.    Daily Summary:    Unremarkable awake and sleep overnight EEG recording  No epileptiform activity and no significant clinical events occurred.    Brandt Johns MD  Attending Neurologist, [facility]      Daily Updates  Day 2  July 28 07:00 to 3:00 PM:   Awake Background:  continuous, with predominantly alpha and beta  frequencies.  Symmetry:  No persistent asymmetries of voltage or frequency.  Organization: Appropriate anterior-posterior gradient.  Posterior Dominant Rhythm: 12 Hz symmetric, well-organized, and well-modulated.  Voltage:  Normal (20+ uV)  Variability: Yes. 		Reactivity: Yes.  N2 sleep: symmetric, synchronous sleep spindles/K-complexes.  Spontaneous Activity:  No epileptiform discharges.  Periodic/rhythmic activity: None.  Events:  No electrographic seizures or significant clinical events.  Provocations:  Hyperventilation and Photic stimulation: not performed.  Daily Summary:    No change from prior day.  No events occurred.    Brandt Johns MD  Attending Neurologist, [facility]        FINAL Summary:  Normal continuous av-EEG study.  1)	The EEG remained normal throughout the study.      Final Clinical Correlation:  There were no findings of active epilepsy, however this alone does not rule out the diagnosis.       Brandt Johns MD  Attending Neurologist, [facility]         [facility] Department of Neurology  Inpatient Continuous video-Electroencephalogram    Patient Name:	MELODY MILLS    :	1965  MRN:	[id number]    Study Start Date/Time:  2022, 9:20:56 AM  Study End Date/Time:	2022, 2:58 PM    Referred by: Dr. Woody Lane    Brief Clinical History:  MELODY MILLS is a 56 year old Female with history of seizures or seizure-like events and frequent high alcohol consumption; study performed to investigate for seizures or markers of epilepsy.  Technologist notes:-    Diagnosis Code:   R56.9 convulsions/seizure  The live video was: monitored continuously by trained technicians.    Pertinent Medications:  LEV XR 750mg daily, lorazepam IV in the ED    Acquisition Details:  Electroencephalography was acquired using a minimum of 21 channels on an [employer] system v 8.5.1 with electrode placement according to the standard International 10-20 system following ACNS (American Clinical Neurophysiology Society) guidelines for Long-Term Video EEG monitoring.  Anterior temporal T1 and T2 electrodes were utilized whenever possible.   The [employer] automated spike & seizure detections were all reviewed in detail, in addition to extensive portions of raw EEG.      Day 1: 2022 @ 9:20:56 AM to next morning @ 07:00 am  Background:  continuous, with predominantly beta and alpha frequencies.  Symmetry:  No persistent asymmetries of voltage or frequency.  Posterior Dominant Rhythm:  12 Hz symmetric, well-organized, and well-modulated.  Organization: Appropriate anterior-posterior gradient.  Voltage:  Normal (20+ uV)  Variability: Yes. 		Reactivity: Yes.  N2 sleep: Symmetric, synchronous spindles and K complexes.  Spontaneous Activity:  No epileptiform discharges.  Periodic/rhythmic activity:  None.  Events:  No electrographic seizures or significant clinical events.  Provocations:  Hyperventilation and Photic stimulation: was not performed.    Daily Summary:    Unremarkable awake and sleep overnight EEG recording aside from diffuse excess beta.  No epileptiform activity and no significant clinical events occurred.    Brandt Johns MD  Attending Neurologist, [facility]      Daily Updates  Day 2  July 28 07:00 to 3:00 PM:   Awake Background:  continuous, with predominantly beta and alpha frequencies.  Symmetry:  No persistent asymmetries of voltage or frequency.  Organization: Appropriate anterior-posterior gradient.  Posterior Dominant Rhythm: 12 Hz symmetric, well-organized, and well-modulated.  Voltage:  Normal (20+ uV)  Variability: Yes. 		Reactivity: Yes.  N2 sleep: symmetric, synchronous sleep spindles/K-complexes.  Spontaneous Activity:  No epileptiform discharges.  Periodic/rhythmic activity: None.  Events:  No electrographic seizures or significant clinical events.  Provocations:  Hyperventilation and Photic stimulation: not performed.  Daily Summary:    No change from prior day.  No events occurred.    Brandt Johns MD  Attending Neurologist, [facility]        FINAL Summary:  Normal continuous av-EEG study.  1)	The EEG remained normal throughout the study., aside from diffuse beta activity that may be in excess.      Final Clinical Correlation:  Diffuse beta may be due to medication effect, for instance a typical response to benzodiazepine usage.  There were no findings of active epilepsy, however this alone does not rule out the diagnosis.       Brandt Johns MD  Attending Neurologist, [facility]

## 2022-07-30 LAB — LEVETIRACETAM SERPL-MCNC: 8.8 UG/ML — LOW (ref 10–40)

## 2022-07-31 LAB — LEVETIRACETAM SERPL-MCNC: <1 UG/ML — LOW (ref 10–40)

## 2022-08-01 PROBLEM — Z00.00 ENCOUNTER FOR PREVENTIVE HEALTH EXAMINATION: Status: ACTIVE | Noted: 2022-08-01

## 2022-08-02 DIAGNOSIS — E87.2 ACIDOSIS: ICD-10-CM

## 2022-08-02 DIAGNOSIS — G93.41 METABOLIC ENCEPHALOPATHY: ICD-10-CM

## 2022-08-02 DIAGNOSIS — G40.89 OTHER SEIZURES: ICD-10-CM

## 2022-08-02 DIAGNOSIS — E44.0 MODERATE PROTEIN-CALORIE MALNUTRITION: ICD-10-CM

## 2022-08-02 DIAGNOSIS — E87.6 HYPOKALEMIA: ICD-10-CM

## 2022-08-02 DIAGNOSIS — F41.0 PANIC DISORDER [EPISODIC PAROXYSMAL ANXIETY]: ICD-10-CM

## 2022-08-02 DIAGNOSIS — F41.9 ANXIETY DISORDER, UNSPECIFIED: ICD-10-CM

## 2022-08-02 DIAGNOSIS — Z91.14 PATIENT'S OTHER NONCOMPLIANCE WITH MEDICATION REGIMEN: ICD-10-CM

## 2022-08-02 DIAGNOSIS — R26.81 UNSTEADINESS ON FEET: ICD-10-CM

## 2022-08-02 DIAGNOSIS — R56.9 UNSPECIFIED CONVULSIONS: ICD-10-CM

## 2022-08-02 DIAGNOSIS — D64.9 ANEMIA, UNSPECIFIED: ICD-10-CM

## 2022-08-02 DIAGNOSIS — Z72.0 TOBACCO USE: ICD-10-CM

## 2022-08-02 DIAGNOSIS — I10 ESSENTIAL (PRIMARY) HYPERTENSION: ICD-10-CM

## 2022-08-02 DIAGNOSIS — E87.1 HYPO-OSMOLALITY AND HYPONATREMIA: ICD-10-CM

## 2022-08-02 DIAGNOSIS — F10.239 ALCOHOL DEPENDENCE WITH WITHDRAWAL, UNSPECIFIED: ICD-10-CM

## 2022-08-12 ENCOUNTER — APPOINTMENT (OUTPATIENT)
Dept: INTERNAL MEDICINE | Facility: CLINIC | Age: 57
End: 2022-08-12

## 2022-08-12 VITALS
DIASTOLIC BLOOD PRESSURE: 82 MMHG | BODY MASS INDEX: 20.8 KG/M2 | OXYGEN SATURATION: 99 % | TEMPERATURE: 98.3 F | HEIGHT: 62 IN | SYSTOLIC BLOOD PRESSURE: 128 MMHG | WEIGHT: 113 LBS | HEART RATE: 92 BPM | RESPIRATION RATE: 16 BRPM

## 2022-08-12 DIAGNOSIS — I10 ESSENTIAL (PRIMARY) HYPERTENSION: ICD-10-CM

## 2022-08-12 DIAGNOSIS — Z13.9 ENCOUNTER FOR SCREENING, UNSPECIFIED: ICD-10-CM

## 2022-08-12 PROCEDURE — 99495 TRANSJ CARE MGMT MOD F2F 14D: CPT | Mod: 25,GC

## 2022-08-12 PROCEDURE — 99408 AUDIT/DAST 15-30 MIN: CPT

## 2022-08-19 ENCOUNTER — APPOINTMENT (OUTPATIENT)
Dept: INTERNAL MEDICINE | Facility: CLINIC | Age: 57
End: 2022-08-19

## 2022-08-24 ENCOUNTER — APPOINTMENT (OUTPATIENT)
Dept: INTERNAL MEDICINE | Facility: CLINIC | Age: 57
End: 2022-08-24

## 2022-10-31 ENCOUNTER — EMERGENCY (EMERGENCY)
Facility: HOSPITAL | Age: 57
LOS: 1 days | Discharge: LEFT BEFORE DISCHARGE | End: 2022-10-31
Attending: STUDENT IN AN ORGANIZED HEALTH CARE EDUCATION/TRAINING PROGRAM | Admitting: STUDENT IN AN ORGANIZED HEALTH CARE EDUCATION/TRAINING PROGRAM
Payer: COMMERCIAL

## 2022-10-31 VITALS
DIASTOLIC BLOOD PRESSURE: 90 MMHG | TEMPERATURE: 99 F | HEIGHT: 64 IN | WEIGHT: 119.93 LBS | HEART RATE: 70 BPM | SYSTOLIC BLOOD PRESSURE: 172 MMHG | RESPIRATION RATE: 16 BRPM | OXYGEN SATURATION: 98 %

## 2022-10-31 LAB
ALBUMIN SERPL ELPH-MCNC: 4.3 G/DL — SIGNIFICANT CHANGE UP (ref 3.3–5)
ALP SERPL-CCNC: 117 U/L — SIGNIFICANT CHANGE UP (ref 40–120)
ALT FLD-CCNC: 20 U/L — SIGNIFICANT CHANGE UP (ref 10–45)
ANION GAP SERPL CALC-SCNC: 18 MMOL/L — HIGH (ref 5–17)
APTT BLD: 31.2 SEC — SIGNIFICANT CHANGE UP (ref 27.5–35.5)
AST SERPL-CCNC: 54 U/L — HIGH (ref 10–40)
BASOPHILS # BLD AUTO: 0 K/UL — SIGNIFICANT CHANGE UP (ref 0–0.2)
BASOPHILS NFR BLD AUTO: 0 % — SIGNIFICANT CHANGE UP (ref 0–2)
BILIRUB SERPL-MCNC: 0.4 MG/DL — SIGNIFICANT CHANGE UP (ref 0.2–1.2)
BUN SERPL-MCNC: 6 MG/DL — LOW (ref 7–23)
CALCIUM SERPL-MCNC: 9.6 MG/DL — SIGNIFICANT CHANGE UP (ref 8.4–10.5)
CHLORIDE SERPL-SCNC: 91 MMOL/L — LOW (ref 96–108)
CO2 SERPL-SCNC: 27 MMOL/L — SIGNIFICANT CHANGE UP (ref 22–31)
CREAT SERPL-MCNC: 0.3 MG/DL — LOW (ref 0.5–1.3)
EGFR: 124 ML/MIN/1.73M2 — SIGNIFICANT CHANGE UP
EOSINOPHIL # BLD AUTO: 0 K/UL — SIGNIFICANT CHANGE UP (ref 0–0.5)
EOSINOPHIL NFR BLD AUTO: 0 % — SIGNIFICANT CHANGE UP (ref 0–6)
GLUCOSE SERPL-MCNC: 155 MG/DL — HIGH (ref 70–99)
HCT VFR BLD CALC: 30.6 % — LOW (ref 34.5–45)
HGB BLD-MCNC: 10.1 G/DL — LOW (ref 11.5–15.5)
INR BLD: 1.06 — SIGNIFICANT CHANGE UP (ref 0.88–1.16)
LYMPHOCYTES # BLD AUTO: 0.21 K/UL — LOW (ref 1–3.3)
LYMPHOCYTES # BLD AUTO: 1.7 % — LOW (ref 13–44)
MAGNESIUM SERPL-MCNC: 1.6 MG/DL — SIGNIFICANT CHANGE UP (ref 1.6–2.6)
MANUAL SMEAR VERIFICATION: SIGNIFICANT CHANGE UP
MCHC RBC-ENTMCNC: 30.9 PG — SIGNIFICANT CHANGE UP (ref 27–34)
MCHC RBC-ENTMCNC: 33 GM/DL — SIGNIFICANT CHANGE UP (ref 32–36)
MCV RBC AUTO: 93.6 FL — SIGNIFICANT CHANGE UP (ref 80–100)
MONOCYTES # BLD AUTO: 0.32 K/UL — SIGNIFICANT CHANGE UP (ref 0–0.9)
MONOCYTES NFR BLD AUTO: 2.6 % — SIGNIFICANT CHANGE UP (ref 2–14)
MYELOCYTES NFR BLD: 0.9 % — HIGH (ref 0–0)
NEUTROPHILS # BLD AUTO: 11.71 K/UL — HIGH (ref 1.8–7.4)
NEUTROPHILS NFR BLD AUTO: 94.8 % — HIGH (ref 43–77)
PHOSPHATE SERPL-MCNC: 3 MG/DL — SIGNIFICANT CHANGE UP (ref 2.5–4.5)
PLAT MORPH BLD: NORMAL — SIGNIFICANT CHANGE UP
PLATELET # BLD AUTO: 196 K/UL — SIGNIFICANT CHANGE UP (ref 150–400)
POLYCHROMASIA BLD QL SMEAR: SLIGHT — SIGNIFICANT CHANGE UP
POTASSIUM SERPL-MCNC: 3.8 MMOL/L — SIGNIFICANT CHANGE UP (ref 3.5–5.3)
POTASSIUM SERPL-SCNC: 3.8 MMOL/L — SIGNIFICANT CHANGE UP (ref 3.5–5.3)
PROT SERPL-MCNC: 7.7 G/DL — SIGNIFICANT CHANGE UP (ref 6–8.3)
PROTHROM AB SERPL-ACNC: 12.6 SEC — SIGNIFICANT CHANGE UP (ref 10.5–13.4)
RBC # BLD: 3.27 M/UL — LOW (ref 3.8–5.2)
RBC # FLD: 18.5 % — HIGH (ref 10.3–14.5)
RBC BLD AUTO: ABNORMAL
SARS-COV-2 RNA SPEC QL NAA+PROBE: NEGATIVE — SIGNIFICANT CHANGE UP
SODIUM SERPL-SCNC: 136 MMOL/L — SIGNIFICANT CHANGE UP (ref 135–145)
TROPONIN T SERPL-MCNC: 0.01 NG/ML — SIGNIFICANT CHANGE UP (ref 0–0.01)
WBC # BLD: 12.35 K/UL — HIGH (ref 3.8–10.5)
WBC # FLD AUTO: 12.35 K/UL — HIGH (ref 3.8–10.5)

## 2022-10-31 PROCEDURE — 71045 X-RAY EXAM CHEST 1 VIEW: CPT | Mod: 26

## 2022-10-31 PROCEDURE — 93010 ELECTROCARDIOGRAM REPORT: CPT

## 2022-10-31 PROCEDURE — 99285 EMERGENCY DEPT VISIT HI MDM: CPT | Mod: 25

## 2022-10-31 RX ORDER — SODIUM CHLORIDE 9 MG/ML
1000 INJECTION, SOLUTION INTRAVENOUS ONCE
Refills: 0 | Status: COMPLETED | OUTPATIENT
Start: 2022-10-31 | End: 2022-10-31

## 2022-10-31 RX ADMIN — SODIUM CHLORIDE 1000 MILLILITER(S): 9 INJECTION, SOLUTION INTRAVENOUS at 19:40

## 2022-10-31 RX ADMIN — SODIUM CHLORIDE 1000 MILLILITER(S): 9 INJECTION, SOLUTION INTRAVENOUS at 21:48

## 2022-10-31 NOTE — ED PROVIDER NOTE - PHYSICAL EXAMINATION
CONSTITUTIONAL: Non-toxic; in no apparent distress  HEAD: Normocephalic; atraumatic  EYES: PERRL; EOM intact   ENMT: External appears normal  NECK: Supple; non-tender  CARD: Normal S1, S2; no murmurs, rubs, or gallops  RESP: Normal chest excursion with respiration; breath sounds clear and equal bilaterally  ABD: Soft, non-distended; non-tender  EXT: Normal ROM in all four extremities; non-tender to palpation  SKIN: Warm, dry, no rash  NEURO: No focal neurological deficiencies, CN 2-12 intact BL, no dysmetria, no pronator drift, gait normal, strength/sensation intact throughout, normal cognition

## 2022-10-31 NOTE — ED PROVIDER NOTE - CLINICAL SUMMARY MEDICAL DECISION MAKING FREE TEXT BOX
pw seizure in the context of known seizure disorder and not in an unusual timing in relation to her expected seizure frequency. Eval for electrolyte disturbance, renal dysfunction, blood cell line level abnormalities.  General fatigue, will r/o infectious vs metabolic cause w CXR, UA, basic labs  Unclear seizure history - today vs yesterday. Known seizure disorder. Pt not clinically in alcohol wd in ED: CIWA=0.  She has no neuro findings, will defer CTH.

## 2022-10-31 NOTE — ED ADULT NURSE NOTE - OBJECTIVE STATEMENT
Pt A&Ox4, sleepy, speaking in clear/full sentences, no acute distress, vital signs stable. Pt BIB EMS for seizures. PT poor historian. Per , pt was seizing when  arrived home from work.  reports pt was ashen in color and foaming at the mouth. Per , 3 wine bottles were found on the floor.  reports that pt seizures are usually induced by ETOH. Pt did not seize with EMS. NO active seizing noted at this time. Pt reports she is sleepy and "out of it." No other complaints. Pt endorses last drink yesterday. Pt A&Ox2, sleepy, speaking in clear/full sentences, no acute distress, vital signs stable. Pt BIB EMS for seizures. PT poor historian. Per , pt was seizing when  arrived home from work.  reports pt was ashen in color and foaming at the mouth. Per , 3 wine bottles were found on the floor.  reports that pt seizures are usually induced by ETOH. Pt did not seize with EMS. NO active seizing noted at this time. Pt reports she is sleepy and "out of it." No other complaints. Pt endorses last drink yesterday.

## 2022-10-31 NOTE — ED PROVIDER NOTE - OBJECTIVE STATEMENT
58 yo F w PMH seizures, ETOH abuse (daily drinker, last drank yesterday) p/w general fatigue. Contrary to statements at triage, patient and  state pt suffered seizure yesterday (not today). She states she is compliant with her medications. Today she has been feeling low-energy and generally unwell. No specific complaints. Pt does not participate in history to great extent, is poor historian.

## 2022-10-31 NOTE — ED ADULT NURSE NOTE - NSELOPED_ED_ALL_ED
IV discontinued, cath removed intact Patient and/or family announced that they are leaving. They were advised to stay, advised to return if worse./Physician notified

## 2022-10-31 NOTE — ED ADULT TRIAGE NOTE - SOURCE OF INFORMATION
Patient
I will SWITCH the dose or number of times a day I take the medications listed below when I get home from the hospital:  None

## 2022-10-31 NOTE — ED PROVIDER NOTE - NSFOLLOWUPINSTRUCTIONS_ED_ALL_ED_FT
Follow up with your primary medical doctor as soon as possible.  Follow up with your neurologist  Return to the emergency department if your symptoms worsen or if you develop new symptoms.    Seizure    A seizure is abnormal electrical activity in the brain; the specific cause may or may not be found. Prior to a seizure you may experience a warning sensation (aura) that may include fear, nausea, dizziness, and visual changes such as flashing lights of spots. Common symptoms during the seizure may include an altered mental status, rhythmic jerking movements, drooling, grunting, loss of bladder or bowel control, or tongue biting. After a seizure, you may feel confused and sleepy.     Do not swim, drive, operate machinery, or engage in any risky activity during which a seizure could cause further injury to you or others. Teach friends and family what to do if you HAVE a seizure which includes laying you on the ground with your head on a cushion and turning you to the side to keep your breathing passages clear in case of vomiting.    SEEK IMMEDIATE MEDICAL CARE IF YOU HAVE ANY OF THE FOLLOWING SYMPTOMS: seizure lasting over 5 minutes, not waking up or persistent altered mental status after the seizure, or more frequent or worsening seizures.

## 2022-10-31 NOTE — ED PROVIDER NOTE - NS ED ROS FT
CONSTITUTIONAL: No fever, no chills, +fatigue  EYES: No eye redness, no visual changes  ENT: No ear pain, no sore throat  CARDIOVASCULAR: No chest pain, no palpitations  RESPIRATORY: No cough, no SOB  GI: No abdominal pain, no nausea, no vomiting, no constipation, no diarrhea  GENITOURINARY: No dysuria, no frequency, no hematuria  MUSCULOSKELETAL: No back pain, no joint pain, no myalgias  SKIN: No rash, no peripheral edema  NEURO: No headache, no confusion, + seizure    ALL OTHER SYSTEMS NEGATIVE.

## 2022-10-31 NOTE — ED PROVIDER NOTE - PROGRESS NOTE DETAILS
Pt using restroom, however has not yet given urine sample. She states she would like to leave soon. No longer complaining of fatigue. Will sign out pending UA. [phillip / sonal]  received on sign out. pt w hx of seizures, etoh abuse. here after seizure yesterday. generalized fatigue. last drink yesterday. on keppra.   thus far - baseline anemia. gap slightly elevated to 18. labs otherwise ok. cxr ok  got 2L fluids. will give dose of keppra.   at time of sign out pending UA to complete infectious workup.   anticipate dc home phillip - pt and  very upset. want to leave.  states that the pt is not going to give a urine sample. requesting discharge.   will send script for macrobid given concern for infection by initial team and no sample given  vitals w/o tachycardia and no hypertension. ambulatory in ED w steady gait. phillip - planned for dc w/o urine but when went to talk to pt about macrobid script and f/u and to ensure has current keppra script pt and  had already walked out. prior to discussion / paperwork-

## 2022-11-01 VITALS
TEMPERATURE: 100 F | HEART RATE: 87 BPM | OXYGEN SATURATION: 94 % | SYSTOLIC BLOOD PRESSURE: 147 MMHG | RESPIRATION RATE: 17 BRPM | DIASTOLIC BLOOD PRESSURE: 79 MMHG

## 2022-11-01 PROCEDURE — 85025 COMPLETE CBC W/AUTO DIFF WBC: CPT

## 2022-11-01 PROCEDURE — 87635 SARS-COV-2 COVID-19 AMP PRB: CPT

## 2022-11-01 PROCEDURE — 84484 ASSAY OF TROPONIN QUANT: CPT

## 2022-11-01 PROCEDURE — 85610 PROTHROMBIN TIME: CPT

## 2022-11-01 PROCEDURE — 83735 ASSAY OF MAGNESIUM: CPT

## 2022-11-01 PROCEDURE — 80053 COMPREHEN METABOLIC PANEL: CPT

## 2022-11-01 PROCEDURE — 85730 THROMBOPLASTIN TIME PARTIAL: CPT

## 2022-11-01 PROCEDURE — 93005 ELECTROCARDIOGRAM TRACING: CPT

## 2022-11-01 PROCEDURE — 36415 COLL VENOUS BLD VENIPUNCTURE: CPT

## 2022-11-01 PROCEDURE — 71045 X-RAY EXAM CHEST 1 VIEW: CPT

## 2022-11-01 PROCEDURE — 99285 EMERGENCY DEPT VISIT HI MDM: CPT | Mod: 25

## 2022-11-01 PROCEDURE — 84100 ASSAY OF PHOSPHORUS: CPT

## 2022-11-01 RX ORDER — LEVETIRACETAM 250 MG/1
750 TABLET, FILM COATED ORAL ONCE
Refills: 0 | Status: COMPLETED | OUTPATIENT
Start: 2022-11-01 | End: 2022-11-01

## 2022-11-01 RX ORDER — NITROFURANTOIN MACROCRYSTAL 50 MG
1 CAPSULE ORAL
Qty: 14 | Refills: 0
Start: 2022-11-01 | End: 2022-11-07

## 2022-11-01 RX ADMIN — LEVETIRACETAM 750 MILLIGRAM(S): 250 TABLET, FILM COATED ORAL at 00:13

## 2022-11-03 DIAGNOSIS — R53.83 OTHER FATIGUE: ICD-10-CM

## 2022-11-03 DIAGNOSIS — G40.909 EPILEPSY, UNSPECIFIED, NOT INTRACTABLE, WITHOUT STATUS EPILEPTICUS: ICD-10-CM

## 2022-11-03 DIAGNOSIS — Z86.59 PERSONAL HISTORY OF OTHER MENTAL AND BEHAVIORAL DISORDERS: ICD-10-CM

## 2022-11-03 DIAGNOSIS — I10 ESSENTIAL (PRIMARY) HYPERTENSION: ICD-10-CM

## 2022-11-03 DIAGNOSIS — Z20.822 CONTACT WITH AND (SUSPECTED) EXPOSURE TO COVID-19: ICD-10-CM

## 2022-11-03 DIAGNOSIS — R56.9 UNSPECIFIED CONVULSIONS: ICD-10-CM

## 2022-12-15 ENCOUNTER — APPOINTMENT (OUTPATIENT)
Dept: NEUROLOGY | Facility: CLINIC | Age: 57
End: 2022-12-15

## 2022-12-15 VITALS — SYSTOLIC BLOOD PRESSURE: 160 MMHG | DIASTOLIC BLOOD PRESSURE: 94 MMHG

## 2022-12-15 VITALS
HEART RATE: 88 BPM | SYSTOLIC BLOOD PRESSURE: 163 MMHG | WEIGHT: 107 LBS | BODY MASS INDEX: 19.69 KG/M2 | TEMPERATURE: 96.7 F | OXYGEN SATURATION: 96 % | HEIGHT: 62 IN | DIASTOLIC BLOOD PRESSURE: 90 MMHG

## 2022-12-15 DIAGNOSIS — Z78.9 OTHER SPECIFIED HEALTH STATUS: ICD-10-CM

## 2022-12-15 DIAGNOSIS — G45.9 TRANSIENT CEREBRAL ISCHEMIC ATTACK, UNSPECIFIED: ICD-10-CM

## 2022-12-15 DIAGNOSIS — F17.200 NICOTINE DEPENDENCE, UNSPECIFIED, UNCOMPLICATED: ICD-10-CM

## 2022-12-15 DIAGNOSIS — Z82.49 FAMILY HISTORY OF ISCHEMIC HEART DISEASE AND OTHER DISEASES OF THE CIRCULATORY SYSTEM: ICD-10-CM

## 2022-12-15 PROCEDURE — 99215 OFFICE O/P EST HI 40 MIN: CPT

## 2022-12-15 RX ORDER — THIAMINE MONONITRATE (VIT B1) 100 MG
100 TABLET ORAL DAILY
Refills: 0 | Status: ACTIVE | COMMUNITY

## 2022-12-15 NOTE — PHYSICAL EXAM
[Person] : oriented to person [Place] : oriented to place [Time] : oriented to time [Remote Intact] : remote memory intact [Naming Objects] : no difficulty naming common objects [Repeating Phrases] : no difficulty repeating a phrase [Fluency] : fluency intact [Comprehension] : comprehension intact [Cranial Nerves Optic (II)] : visual acuity intact bilaterally,  visual fields full to confrontation, pupils equal round and reactive to light [Cranial Nerves Oculomotor (III)] : extraocular motion intact [Motor Tone] : muscle tone was normal in all four extremities [Motor Handedness Right-Handed] : the patient is right hand dominant [Sensation Tactile Decrease] : light touch was intact [Limited Balance] : the patient's balance was impaired [2+] : Patella left 2+ [Short Term Intact] : short term memory impaired

## 2022-12-15 NOTE — REVIEW OF SYSTEMS
[Seizures] : convulsions [As Noted in HPI] : as noted in HPI [Anxiety] : anxiety [Negative] : Respiratory [de-identified] : ETOH abuse

## 2022-12-15 NOTE — DISCUSSION/SUMMARY
[FreeTextEntry1] : 56 y/o woman with ETOH abuse and recurrent seizures due to ETOH w/d\par On keppra 500 mg QD

## 2022-12-15 NOTE — HISTORY OF PRESENT ILLNESS
[FreeTextEntry1] : Initial Visit\par \par \par 57 RH female with alcohol abuse and seizures, \par ETOH abuse x 10 years\par \par Ex  in [place]. \par \par Drinks 2 btl a nights. 8-10 btls a week\par Was drinking Vodka in the past. \par \par \par Sleeps poorly\par Mood: down\par Never wanted to do rehab\par \par \par Hospital course\par \par #Seizure \par  Pt with recent seizure + witness seizure in ED noted to have generalized tonic \par clonic shaking w/ foaming at mouth. Lasted 1-2 minutes. Uncertain if history of \par seizure disorder. Per , no seizure prior to last 15 months. History of \par alcohol use disorder with daily alcohol use, but reports not drinking day of \par seizure. Seizure possibly alcohol withdrawal related vs. baseline seizure \par disorder. Awaiting Keppra level results, will report levels to pt as it is send \par out lab. Per pharmacy, Keppra last picked up 03/2022. vEEG demonstrated no \par seizure activity. Epilepsy followed with recs as below \par  -Keppra to 750mg QD \par -Phenobarbital 32.4mg QHS \par \par # Alcohol withdrawal \par Pt with alcohol use disorder as per  who states they find bottles of \par vodka in garbage that belongs to pt. Suspected last drink 7/25. Negative CT \par head and no FND on exam. Blood alcohol <10. Pt afebrile, no leukocytosis. \par Received 2mg ativan in ED. On exam in ED, pt mildly agitated and anxious in \par bed, non combative but needing redirection. AAOx3. 7/28/22, patient was AAOx3, \par non combative but needing redirection, not linear stories. Recieved thiamine IV \par 500mg. Started MV and folate. CIWA 0 at time of discharge. Folate/B12 WNL \par -Thiamine 1 g PO QD \par -Counseling provided to alcohol cessation \par \par # Hypertension. \par Hx of HTN. /95 on admission. Currently Normotensive. At home is on \par clonidine PO 1 mg BID, hydralazine PO 25mg BID with poor adherence. \par -C/w home medications \par \par Patient was discharged to: home with home PT recs \par \par \par AED\par Keppra 500 mg QAM\par B1 vit\par

## 2023-01-05 ENCOUNTER — APPOINTMENT (OUTPATIENT)
Dept: INTERNAL MEDICINE | Facility: CLINIC | Age: 58
End: 2023-01-05
Payer: SELF-PAY

## 2023-01-05 PROCEDURE — PCNS1: CPT

## 2023-04-04 ENCOUNTER — APPOINTMENT (OUTPATIENT)
Dept: NEUROLOGY | Facility: CLINIC | Age: 58
End: 2023-04-04

## 2023-04-05 ENCOUNTER — EMERGENCY (EMERGENCY)
Facility: HOSPITAL | Age: 58
LOS: 1 days | Discharge: HOME | End: 2023-04-05
Attending: EMERGENCY MEDICINE | Admitting: EMERGENCY MEDICINE
Payer: COMMERCIAL

## 2023-04-05 VITALS
TEMPERATURE: 98 F | DIASTOLIC BLOOD PRESSURE: 91 MMHG | HEIGHT: 66 IN | RESPIRATION RATE: 17 BRPM | SYSTOLIC BLOOD PRESSURE: 148 MMHG | HEART RATE: 85 BPM | WEIGHT: 119.93 LBS | OXYGEN SATURATION: 100 %

## 2023-04-05 DIAGNOSIS — I10 ESSENTIAL (PRIMARY) HYPERTENSION: ICD-10-CM

## 2023-04-05 DIAGNOSIS — F17.200 NICOTINE DEPENDENCE, UNSPECIFIED, UNCOMPLICATED: ICD-10-CM

## 2023-04-05 DIAGNOSIS — R41.82 ALTERED MENTAL STATUS, UNSPECIFIED: ICD-10-CM

## 2023-04-05 DIAGNOSIS — G40.909 EPILEPSY, UNSPECIFIED, NOT INTRACTABLE, WITHOUT STATUS EPILEPTICUS: ICD-10-CM

## 2023-04-05 DIAGNOSIS — Z86.59 PERSONAL HISTORY OF OTHER MENTAL AND BEHAVIORAL DISORDERS: ICD-10-CM

## 2023-04-05 LAB
ALBUMIN SERPL ELPH-MCNC: 4.7 G/DL — SIGNIFICANT CHANGE UP (ref 3.3–5)
ALP SERPL-CCNC: 105 U/L — SIGNIFICANT CHANGE UP (ref 40–120)
ALT FLD-CCNC: 32 U/L — SIGNIFICANT CHANGE UP (ref 10–45)
ANION GAP SERPL CALC-SCNC: 17 MMOL/L — SIGNIFICANT CHANGE UP (ref 5–17)
AST SERPL-CCNC: 71 U/L — HIGH (ref 10–40)
BASOPHILS # BLD AUTO: 0.06 K/UL — SIGNIFICANT CHANGE UP (ref 0–0.2)
BASOPHILS NFR BLD AUTO: 0.9 % — SIGNIFICANT CHANGE UP (ref 0–2)
BILIRUB SERPL-MCNC: 0.5 MG/DL — SIGNIFICANT CHANGE UP (ref 0.2–1.2)
BUN SERPL-MCNC: 9 MG/DL — SIGNIFICANT CHANGE UP (ref 7–23)
CALCIUM SERPL-MCNC: 9.4 MG/DL — SIGNIFICANT CHANGE UP (ref 8.4–10.5)
CHLORIDE SERPL-SCNC: 90 MMOL/L — LOW (ref 96–108)
CO2 SERPL-SCNC: 27 MMOL/L — SIGNIFICANT CHANGE UP (ref 22–31)
CREAT SERPL-MCNC: 0.37 MG/DL — LOW (ref 0.5–1.3)
EGFR: 118 ML/MIN/1.73M2 — SIGNIFICANT CHANGE UP
EOSINOPHIL # BLD AUTO: 0 K/UL — SIGNIFICANT CHANGE UP (ref 0–0.5)
EOSINOPHIL NFR BLD AUTO: 0 % — SIGNIFICANT CHANGE UP (ref 0–6)
GLUCOSE SERPL-MCNC: 169 MG/DL — HIGH (ref 70–99)
HCT VFR BLD CALC: 35.7 % — SIGNIFICANT CHANGE UP (ref 34.5–45)
HGB BLD-MCNC: 11.4 G/DL — LOW (ref 11.5–15.5)
LACTATE SERPL-SCNC: 1.3 MMOL/L — SIGNIFICANT CHANGE UP (ref 0.5–2)
LYMPHOCYTES # BLD AUTO: 0.34 K/UL — LOW (ref 1–3.3)
LYMPHOCYTES # BLD AUTO: 5.2 % — LOW (ref 13–44)
MANUAL SMEAR VERIFICATION: SIGNIFICANT CHANGE UP
MCHC RBC-ENTMCNC: 31.9 GM/DL — LOW (ref 32–36)
MCHC RBC-ENTMCNC: 32.3 PG — SIGNIFICANT CHANGE UP (ref 27–34)
MCV RBC AUTO: 101.1 FL — HIGH (ref 80–100)
MONOCYTES # BLD AUTO: 0.17 K/UL — SIGNIFICANT CHANGE UP (ref 0–0.9)
MONOCYTES NFR BLD AUTO: 2.6 % — SIGNIFICANT CHANGE UP (ref 2–14)
NEUTROPHILS # BLD AUTO: 5.9 K/UL — SIGNIFICANT CHANGE UP (ref 1.8–7.4)
NEUTROPHILS NFR BLD AUTO: 91.3 % — HIGH (ref 43–77)
PLAT MORPH BLD: NORMAL — SIGNIFICANT CHANGE UP
PLATELET # BLD AUTO: 130 K/UL — LOW (ref 150–400)
POTASSIUM SERPL-MCNC: 4.3 MMOL/L — SIGNIFICANT CHANGE UP (ref 3.5–5.3)
POTASSIUM SERPL-SCNC: 4.3 MMOL/L — SIGNIFICANT CHANGE UP (ref 3.5–5.3)
PROT SERPL-MCNC: 7.9 G/DL — SIGNIFICANT CHANGE UP (ref 6–8.3)
RBC # BLD: 3.53 M/UL — LOW (ref 3.8–5.2)
RBC # FLD: 14.7 % — HIGH (ref 10.3–14.5)
RBC BLD AUTO: NORMAL — SIGNIFICANT CHANGE UP
SODIUM SERPL-SCNC: 134 MMOL/L — LOW (ref 135–145)
WBC # BLD: 6.46 K/UL — SIGNIFICANT CHANGE UP (ref 3.8–10.5)
WBC # FLD AUTO: 6.46 K/UL — SIGNIFICANT CHANGE UP (ref 3.8–10.5)

## 2023-04-05 PROCEDURE — 99285 EMERGENCY DEPT VISIT HI MDM: CPT

## 2023-04-05 NOTE — ED PROVIDER NOTE - OBJECTIVE STATEMENT
57F hx seizures, htn, etoh abuse, BIBEMS after  found her on the floor next to the bed. per triage note  stated that pt was acting how she did post-seizure. pt unable to provide clear hx. states feels tired. states she takes her medications but is unsure which ones. per chart review pt on keppra and phenobarbital.

## 2023-04-05 NOTE — ED ADULT TRIAGE NOTE - CHIEF COMPLAINT QUOTE
Pt BIBEMS after being found on floor at 530pm next to bed after unwitnessed seizure.  states "this is what she acts like after a seizure, shes confused for a long time after." Pt AAOx2, disoriented to time. No numbness, weakness, or facial droop. Hx of seizures.

## 2023-04-05 NOTE — ED PROVIDER NOTE - PROGRESS NOTE DETAILS
pt more awake, able to ambulate.  at bedside to accompany pt home. states pt takes her medications and has a neuro follow-up  I have discussed the discharge plan with the patient. The patient agrees with the plan, as discussed.  The patient understands Emergency Department diagnosis is a preliminary diagnosis often based on limited information and that the patient must adhere to the follow-up plan as discussed.  The patient understands that if the symptoms worsen the patient may return to the Emergency Department at any time for further evaluation and treatment.

## 2023-04-05 NOTE — ED PROVIDER NOTE - NSFOLLOWUPINSTRUCTIONS_ED_ALL_ED_FT
Generalized Tonic Clonic Seizures    WHAT YOU NEED TO KNOW:    A generalized tonic-clonic seizure may also be called a grand mal seizure. A seizure means an abnormal area in your brain sometimes sends bursts of electrical activity. A generalized seizure affects both sides of your brain. Tonic and clonic are phases that happen during the seizure. The tonic phase causes your muscles to become stiff. You lose consciousness and may fall down. The clonic phase causes convulsions (repeated muscle contractions). A seizure may last from a few seconds up to 3 minutes. It is an emergency if it lasts longer than 5 minutes.    DISCHARGE INSTRUCTIONS:    Call your local emergency number (911 in the ), or have someone else call, for any of the following:    This is the first seizure you have ever had.    You have trouble breathing or feeling alert after a seizure.    The seizure lasts longer than 5 minutes.    You had a seizure in water, such as in a swimming pool or hot tub.    You have diabetes or are pregnant and had a seizure.  Call your doctor if:    You have a second seizure within 24 hours of the first.    You are injured during a seizure.    You feel you are not able to cope with your condition.    Your seizures start to happen more often.    You are confused longer than usual after a seizure.    You are planning to get pregnant or are currently pregnant.    You have questions or concerns about your condition or care.  Medicines:    Medicines may be given to treat certain health conditions. You may need antiepileptic medicine if your seizures are caused by epilepsy. You may need medicine daily to prevent seizures or during a seizure to stop it. Do not stop taking your medicine unless directed by your healthcare provider.    Take your medicine as directed. Contact your healthcare provider if you think your medicine is not helping or if you have side effects. Tell your provider if you are allergic to any medicine. Keep a list of the medicines, vitamins, and herbs you take. Include the amounts, and when and why you take them. Bring the list or the pill bottles to follow-up visits. Carry your medicine list with you in case of an emergency.  What you can do to prevent a tonic-clonic seizure: You may not be able to prevent every seizure. The following can help you manage triggers that may make a seizure start:    Take antiseizure medicine every day at the same time. This will also help prevent medicine side effects. Set an alarm to help remind you to take your medicine every day. If you are a woman, talk to your provider about family planning while you are taking this medicine.    Manage stress. Stress can be a trigger for epilepsy. Exercise can help you reduce stress. Talk to your healthcare provider about exercise that is safe for you. Illness can be a form of stress. Eat a variety of healthy foods and drink plenty of liquids during an illness. Talk to your healthcare provider about other ways to manage stress.    Set a regular sleep schedule. A lack of sleep can trigger a seizure. Try to go to sleep and wake up at the same time every day. Keep your bedroom quiet and dark. Talk to your healthcare provider if you are having trouble sleeping.    Limit or do not drink alcohol as directed. Alcohol can trigger a seizure, especially if you drink a large amount at one time. A drink of alcohol is 12 ounces of beer, 1½ ounces of liquor, or 5 ounces of wine. Talk to your healthcare provider about a safe amount of alcohol for you. Your provider may recommend that you do not drink any alcohol. Tell him or her if you need help to quit drinking.  What you can do to manage tonic-clonic seizures: The following can help you manage the seizures if you have more than one:    Keep a seizure diary. This can help you find your triggers and avoid them. Possible triggers include illness, lack of sleep, hormone changes, alcohol, drugs, lights, and stress. Write down the dates of your seizures, where you were, and what you were doing. Include how you felt before and after.    Record any auras you have before a seizure. An aura is a sign that you are about to have a seizure. Auras happen before certain types of seizures that are in only 1 part of the brain. The aura may happen seconds before a seizure, or up to an hour before. You may feel, see, hear, or smell something. Examples include part of your body becoming hot. You may see a flash of light or hear something. You may have anxiety or déjà vu. If you have an aura, include it in your seizure diary.    Create a care plan. Tell family, friends, and coworkers about your epilepsy. Give them instructions that tell them how they can keep you safe if you have a seizure.    Ask what safety precautions you should take. Talk with your healthcare provider about driving. You may not be able to drive until you are seizure-free for a period of time. You will need to check the law where you live. Also talk to your healthcare provider about swimming and bathing. You may drown or develop life-threatening heart or lung damage if you have a seizure in water.    Carry medical alert identification. Wear medical alert jewelry or carry a card that says you have tonic-clonic seizures. Ask your healthcare provider where to get these items.  Medical Alert Jewelry  How others can keep you safe during a seizure: Give the following instructions to family, friends, and coworkers:    Do not panic.    Do not hold me down or put anything in my mouth.    Gently guide me to the floor or a soft surface.    Place me on my side to help prevent me from swallowing saliva or vomit.  First Aid: Seizures (ADULT)      Protect me from injury. Remove sharp or hard objects from the area surrounding me, or cushion my head.    Loosen the clothing around my head and neck.    Time how long my seizure lasts. Call 911 if my seizure lasts longer than 5 minutes or if I have a second seizure.    Stay with me until my seizure ends. Let me rest until I am fully awake.    Perform CPR if I stop breathing or you cannot feel my pulse.    Do not give me anything to eat or drink until I am fully awake.  Follow up with your doctor or neurologist as directed: If you take antiseizure medicine, you will need blood tests to check the level in your blood. The medicine may need to be changed or adjusted. Write down your questions so you remember to ask them during your visits.

## 2023-04-05 NOTE — ED PROVIDER NOTE - CLINICAL SUMMARY MEDICAL DECISION MAKING FREE TEXT BOX
possible seizure at home, pt unable to provide clear hx, no focal deficits on exam.   seizure vs. intox, afebrile doubt infection  -check labs  -ekg  -CT head

## 2023-04-05 NOTE — ED ADULT NURSE NOTE - OBJECTIVE STATEMENT
pt. with hx of seizures p/w c/o AMS. as per , he found pt. on the floor around 5:30 pm, she was confused and could not get up, pt. refused to go to the emergency room at that time. as per  pt. acted as she usually does after a seizure. pt. states she did not take her seizure meds today, pt. c/o pain to her chest and throat. in er pt. A&O to person and place, follows commands, no facial droop or focal weakness noted. EKG NSR.

## 2023-04-05 NOTE — ED ADULT NURSE NOTE - NSIMPLEMENTINTERV_GEN_ALL_ED
Implemented All Fall Risk Interventions:  [place] to call system. Call bell, personal items and telephone within reach. Instruct patient to call for assistance. Room bathroom lighting operational. Non-slip footwear when patient is off stretcher. Physically safe environment: no spills, clutter or unnecessary equipment. Stretcher in lowest position, wheels locked, appropriate side rails in place. Provide visual cue, wrist band, yellow gown, etc. Monitor gait and stability. Monitor for mental status changes and reorient to person, place, and time. Review medications for side effects contributing to fall risk. Reinforce activity limits and safety measures with patient and family.

## 2023-04-06 VITALS
OXYGEN SATURATION: 99 % | HEART RATE: 69 BPM | DIASTOLIC BLOOD PRESSURE: 83 MMHG | TEMPERATURE: 98 F | RESPIRATION RATE: 18 BRPM | SYSTOLIC BLOOD PRESSURE: 161 MMHG

## 2023-04-06 LAB — ETHANOL SERPL-MCNC: <10 MG/DL — SIGNIFICANT CHANGE UP (ref 0–10)

## 2023-04-06 PROCEDURE — 70450 CT HEAD/BRAIN W/O DYE: CPT | Mod: MA

## 2023-04-06 PROCEDURE — 83605 ASSAY OF LACTIC ACID: CPT

## 2023-04-06 PROCEDURE — 82962 GLUCOSE BLOOD TEST: CPT

## 2023-04-06 PROCEDURE — 80307 DRUG TEST PRSMV CHEM ANLYZR: CPT

## 2023-04-06 PROCEDURE — 85025 COMPLETE CBC W/AUTO DIFF WBC: CPT

## 2023-04-06 PROCEDURE — 80053 COMPREHEN METABOLIC PANEL: CPT

## 2023-04-06 PROCEDURE — 93005 ELECTROCARDIOGRAM TRACING: CPT

## 2023-04-06 PROCEDURE — 36415 COLL VENOUS BLD VENIPUNCTURE: CPT

## 2023-04-06 PROCEDURE — 99285 EMERGENCY DEPT VISIT HI MDM: CPT | Mod: 25

## 2023-04-06 PROCEDURE — 70450 CT HEAD/BRAIN W/O DYE: CPT | Mod: 26,MA

## 2023-06-22 ENCOUNTER — APPOINTMENT (OUTPATIENT)
Dept: NEUROLOGY | Facility: CLINIC | Age: 58
End: 2023-06-22
Payer: COMMERCIAL

## 2023-06-22 VITALS
OXYGEN SATURATION: 96 % | WEIGHT: 108 LBS | SYSTOLIC BLOOD PRESSURE: 149 MMHG | BODY MASS INDEX: 19.88 KG/M2 | TEMPERATURE: 97.6 F | HEIGHT: 62 IN | DIASTOLIC BLOOD PRESSURE: 78 MMHG | HEART RATE: 66 BPM

## 2023-06-22 PROBLEM — F10.10 ALCOHOL ABUSE, UNCOMPLICATED: Chronic | Status: ACTIVE | Noted: 2023-04-05

## 2023-06-22 PROCEDURE — 95816 EEG AWAKE AND DROWSY: CPT

## 2023-06-22 PROCEDURE — 99214 OFFICE O/P EST MOD 30 MIN: CPT

## 2023-06-22 NOTE — PHYSICAL EXAM
[FreeTextEntry1] : General:\par Constitutional: Sitting comfortably in NAD.\par Psychiatric: well-groomed, appropriate affect\par Ears, Nose, Throat: no abnormalities, mucus membranes moist\par Neck: supple\par Extremities: no edema, clubbing or cyanosis\par Skin: no rash or neuro-cutaneous signs\par \par Cognitive:\par Orientation, language, memory and knowledge screens intact.\par \par Cranial Nerves:\par II: ELIZABETH. III/IV/VI: EOM Full. VII: Face appears symmetric. VIII: Normal to screening. IX/X: Normal phonation. XI: Trapezius Symmetric. XII: Tongue midline. \par Motor:\par Power: No pronator drift.\par \par Normal gait.\par

## 2023-06-22 NOTE — HISTORY OF PRESENT ILLNESS
[FreeTextEntry1] : 6/22/23 HPI:\par \par Julissa is a 57 year old female presenting with her  for a follow up visit for alcohol induced seizure.\par \par No seizures since hospitalization in July 2022. Stable on Keppra 500mg BID. REEG today.\par \par  notes drinking has cut back, but is still a large issue. Drinking at least a bottle of vodka every night. Will often sneak alcohol around the house. \par \par \par Prior:\par 57 RH female with alcohol abuse and seizures, \par ETOH abuse x 10 years\par \par Ex  in [place]. \par \par Drinks 2 btl a nights. 8-10 btls a week\par Was drinking Vodka in the past. \par \par \par Sleeps poorly\par Mood: down\par Never wanted to do rehab\par \par \par Hospital course\par \par #Seizure \par  Pt with recent seizure + witness seizure in ED noted to have generalized tonic \par clonic shaking w/ foaming at mouth. Lasted 1-2 minutes. Uncertain if history of \par seizure disorder. Per , no seizure prior to last 15 months. History of \par alcohol use disorder with daily alcohol use, but reports not drinking day of \par seizure. Seizure possibly alcohol withdrawal related vs. baseline seizure \par disorder. Awaiting Keppra level results, will report levels to pt as it is send \par out lab. Per pharmacy, Keppra last picked up 03/2022. vEEG demonstrated no \par seizure activity. Epilepsy followed with recs as below \par  -Keppra to 750mg QD \par -Phenobarbital 32.4mg QHS \par \par # Alcohol withdrawal \par Pt with alcohol use disorder as per  who states they find bottles of \par vodka in garbage that belongs to pt. Suspected last drink 7/25. Negative CT \par head and no FND on exam. Blood alcohol <10. Pt afebrile, no leukocytosis. \par Received 2mg ativan in ED. On exam in ED, pt mildly agitated and anxious in \par bed, non combative but needing redirection. AAOx3. 7/28/22, patient was AAOx3, \par non combative but needing redirection, not linear stories. Recieved thiamine IV \par 500mg. Started MV and folate. CIWA 0 at time of discharge. Folate/B12 WNL \par -Thiamine 1 g PO QD \par -Counseling provided to alcohol cessation \par \par # Hypertension. \par Hx of HTN. /95 on admission. Currently Normotensive. At home is on \par clonidine PO 1 mg BID, hydralazine PO 25mg BID with poor adherence. \par -C/w home medications \par \par Patient was discharged to: home with home PT recs \par \par \par AED\par Keppra 500 mg QAM\par B1 vit\par

## 2023-06-22 NOTE — ASSESSMENT
[FreeTextEntry1] : 1) Continue Keppra 500mg BID\par 2) Trial of Topiramate 25mg at bedtime for several weeks to attempt to cut alcohol craving. If well tolerated can increase to 50mg nightly\par 3) Follow up in 3-4 months

## 2023-06-22 NOTE — DISCUSSION/SUMMARY
[FreeTextEntry1] : 56 y/o woman with ETOH abuse and recurrent seizures due to ETOH w/d. REEG normal on Keppra 500mg BID\par

## 2023-07-14 NOTE — H&P ADULT - TIME-BASED BILLING (NON-CRITICAL CARE)
"  Problem: Pediatric Behavioral Health Plan of Care  Goal: Optimized Coping Skills in Response to Life Stressors  Outcome: Progressing     Goal Outcome Evaluation: Progressing. Voiced willingness to return home while awaiting an out of home placement.      Plan of Care Reviewed With: patient                Behaviors: Flat and withdrawn this evening. Voices frustration about remaining in the hospital. Did talk about possible discharge plans. Voiced that she would run away if she returned home but states she wants to go to a group home and understands the group home may not take her if she keeps running away.      Groups: Was in music at the beginning of the shift. Did not attend [employer] group but did watch the movie this evening. After movie was social and kind with a younger peer before going to bed.      Safety: Remains on 15 min checks.      Hallucinations: Denies     SI/SIB: Denies     Seclusion/Restraints: none     PRN'S: None this evening.      Sleep/Naps: Rests on bed during the afternoon. Denies napping. States she likes to rest on her bed.     Medical: Complains on \"Restless leg syndrome\" and reports this worsens when her blood pressure is checked. Refused BP this evening. Also requested Benzoyl Peroxide ointment for facial acne.      Intake: Adequate appetite.      Calls: Attempted to call PCA staff but no answer.      Discharge plan: Ongoing discharge planning continues. Possible group home per patient.      " Time-based billing (NON-critical care)

## 2023-07-29 ENCOUNTER — EMERGENCY (EMERGENCY)
Facility: HOSPITAL | Age: 58
LOS: 1 days | Discharge: HOME | End: 2023-07-29
Attending: EMERGENCY MEDICINE | Admitting: EMERGENCY MEDICINE
Payer: COMMERCIAL

## 2023-07-29 VITALS
SYSTOLIC BLOOD PRESSURE: 120 MMHG | OXYGEN SATURATION: 94 % | HEART RATE: 81 BPM | RESPIRATION RATE: 16 BRPM | TEMPERATURE: 97 F | DIASTOLIC BLOOD PRESSURE: 71 MMHG

## 2023-07-29 VITALS
RESPIRATION RATE: 16 BRPM | OXYGEN SATURATION: 93 % | WEIGHT: 117.95 LBS | TEMPERATURE: 98 F | HEART RATE: 87 BPM | HEIGHT: 62 IN | DIASTOLIC BLOOD PRESSURE: 76 MMHG | SYSTOLIC BLOOD PRESSURE: 128 MMHG

## 2023-07-29 PROCEDURE — 70450 CT HEAD/BRAIN W/O DYE: CPT | Mod: 26,MC

## 2023-07-29 PROCEDURE — 99284 EMERGENCY DEPT VISIT MOD MDM: CPT | Mod: 25

## 2023-07-29 PROCEDURE — 99284 EMERGENCY DEPT VISIT MOD MDM: CPT

## 2023-07-29 PROCEDURE — 82962 GLUCOSE BLOOD TEST: CPT

## 2023-07-29 PROCEDURE — 70450 CT HEAD/BRAIN W/O DYE: CPT | Mod: MC

## 2023-07-29 NOTE — ED ADULT NURSE NOTE - CHIEF COMPLAINT QUOTE
Pt presents to ED c/o fall. endorses ETOH use. Mechanical fall on street. Knocked front tooth out. Denies LOC.

## 2023-07-29 NOTE — ED ADULT NURSE NOTE - OBJECTIVE STATEMENT
57 y.o female BIBEMS after trip and fall on street, knocked out R front tooth. Per partner at bedside, pt intoxicated. Pt reports multiple episodes of similar behavior over past few months. Pt denies dizziness, cp, sob, change in vision. Pt does not remember if + LOC, partner denies LOC.

## 2023-07-29 NOTE — ED PROVIDER NOTE - CLINICAL SUMMARY MEDICAL DECISION MAKING FREE TEXT BOX
Pt with etoh abuse presents s/p fall after drinking alcohol, + missing tooth, Alert & Oriented x 3. CN II-XII intact. No facial droop. Clear speech. YADAV w/ 5/5 strength x 4 ext. Normal sensation. No pronator drift. No dysdidokinesia nor dysmetria. Normal heel-to-shin.    CT head negative  Pt ambulating with steady gait.  Friend Garret to  patient.

## 2023-07-29 NOTE — ED PROVIDER NOTE - PATIENT PORTAL LINK FT
You can access the FollowMyHealth Patient Portal offered by [facility] by registering at the following website: [email or web address]. By joining [employer]’s FollowMyHealth portal, you will also be able to view your health information using other applications (apps) compatible with our system.

## 2023-07-29 NOTE — ED PROVIDER NOTE - OBJECTIVE STATEMENT
58 y/o f with pmh of htn, etoh abuse, epilepsy presents to ED s/p etoh use with fall.  Pt lost front tooth.  Denies LOC, nausea, vomiting.  No AC.  Friend Garret dropped her off for evaluation.

## 2023-07-29 NOTE — ED PROVIDER NOTE - NSICDXPASTSURGICALHX_GEN_ALL_CORE_FT
Patient called stating [facility] only has 52 tablets of his Oxycontin. He would like a new script sent to [facility], spoke with Johana Boone, [phone], and canceled script there. Please resign. Patient is requesting you sign this today, as he does not have any to take tonight. Requested Prescriptions     Pending Prescriptions Disp Refills    oxyCODONE (OXYCONTIN) 20 MG extended release tablet 60 tablet 0     Sig: Take 1 tablet by mouth every 12 hours for 30 days.  Intended supply: 30 days       Last Office Visit:  12/21/2020  Next Office Visit:  6/28/2021  Last Medication Refill:  4/20/2021  Ruby Stacy up to date:  4/14/2021    *RX updated to reflect   5/20/2021  fill date*
PAST SURGICAL HISTORY:  No significant past surgical history

## 2023-07-29 NOTE — ED ADULT NURSE NOTE - NSFALLRISKINTERV_ED_ALL_ED
Assistance OOB with selected safe patient handling equipment if applicable/Assistance with ambulation/Communicate fall risk and risk factors to all staff, patient, and family/Monitor gait and stability/Provide visual cue: yellow wristband, yellow gown, etc/Reinforce activity limits and safety measures with patient and family/Call bell, personal items and telephone in reach/Instruct patient to call for assistance before getting out of bed/chair/stretcher/Non-slip footwear applied when patient is off stretcher/[place] to call system/Physically safe environment - no spills, clutter or unnecessary equipment/Purposeful Proactive Rounding/Room/bathroom lighting operational, light cord in reach

## 2023-07-29 NOTE — ED PROVIDER NOTE - NSFOLLOWUPINSTRUCTIONS_ED_ALL_ED_FT
Follow up with your dentist.  Avoid excessive alcohol.  Take tylenol as needed for pain.     Head Injury    WHAT YOU NEED TO KNOW:    A head injury can include your scalp, face, skull, or brain and range from mild to severe. Effects can appear immediately after the injury or develop later. The effects may last a short time or be permanent. Healthcare providers may want to check your recovery over time. Treatment may change as you recover or develop new health problems from the head injury.    DISCHARGE INSTRUCTIONS:    Call your local emergency number (911 in the ), or have someone else call if:    You cannot be woken.    You have a seizure.    You stop responding to others or you faint.    You have blurry or double vision.    Your speech becomes slurred or confused.    You have arm or leg weakness, loss of feeling, or new problems with coordination.    Your pupils are larger than usual, or one pupil is a different size than the other.    You have blood or clear fluid coming out of your ears or nose.  Return to the emergency department if:    You have repeated or forceful vomiting.    You feel confused.    Your headache gets worse or becomes severe.    You or someone caring for you notices that you are harder to wake than usual.  Call your doctor if:    Your symptoms last longer than 6 weeks after the injury.    You have questions or concerns about your condition or care.  Medicines:    Acetaminophen decreases pain and fever. It is available without a doctor's order. Ask how much to take and how often to take it. Follow directions. Read the labels of all other medicines you are using to see if they also contain acetaminophen, or ask your doctor or pharmacist. Acetaminophen can cause liver damage if not taken correctly.    Take your medicine as directed. Contact your healthcare provider if you think your medicine is not helping or if you have side effects. Tell your provider if you are allergic to any medicine. Keep a list of the medicines, vitamins, and herbs you take. Include the amounts, and when and why you take them. Bring the list or the pill bottles to follow-up visits. Carry your medicine list with you in case of an emergency.  Self-care:    Rest or do quiet activities. Limit your time watching TV, using the computer, or doing tasks that require a lot of thinking. Slowly return to your normal activities as directed. Do not play sports or do activities that may cause you to get hit in the head. Ask your healthcare provider when you can return to sports.    Apply ice on your head for 15 to 20 minutes every hour or as directed. Use an ice pack, or put crushed ice in a plastic bag. Cover it with a towel before you apply it to your skin. Ice helps prevent tissue damage and decreases swelling and pain.    Have someone stay with you for 24 hours , or as directed. This person can monitor you for problems and call for help if needed. When you are awake, the person should ask you a few questions every few hours to see if you are thinking clearly. An example is to ask your name or address.  Prevent another head injury:    Wear a helmet that fits properly. Do this when you play sports, or ride a bike, scooter, or skateboard. Helmets help decrease your risk for a serious head injury. Talk to your healthcare provider about other ways you can protect yourself if you play sports.    Wear your seatbelt every time you are in a car. This helps lower your risk for a head injury if you are in a car accident.  Follow up with your doctor as directed: Write down your questions so you remember to ask them during your visits.    © [employer] 1973, 2023    	  back to top            © [employer] 1973, 2023

## 2023-08-02 DIAGNOSIS — F10.90 ALCOHOL USE, UNSPECIFIED, UNCOMPLICATED: ICD-10-CM

## 2023-08-02 DIAGNOSIS — S09.90XA UNSPECIFIED INJURY OF HEAD, INITIAL ENCOUNTER: ICD-10-CM
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
## 2023-08-02 DIAGNOSIS — K08.119 COMPLETE LOSS OF TEETH DUE TO TRAUMA, UNSPECIFIED CLASS: ICD-10-CM

## 2023-08-02 DIAGNOSIS — G40.909 EPILEPSY, UNSPECIFIED, NOT INTRACTABLE, WITHOUT STATUS EPILEPTICUS: ICD-10-CM
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
## 2023-08-02 DIAGNOSIS — I10 ESSENTIAL (PRIMARY) HYPERTENSION: ICD-10-CM

## 2023-08-05 ENCOUNTER — EMERGENCY (EMERGENCY)
Facility: HOSPITAL | Age: 58
LOS: 1 days | Discharge: HOME | End: 2023-08-05
Attending: STUDENT IN AN ORGANIZED HEALTH CARE EDUCATION/TRAINING PROGRAM | Admitting: STUDENT IN AN ORGANIZED HEALTH CARE EDUCATION/TRAINING PROGRAM
Payer: COMMERCIAL

## 2023-08-05 VITALS
TEMPERATURE: 98 F | WEIGHT: 109.35 LBS | SYSTOLIC BLOOD PRESSURE: 151 MMHG | HEART RATE: 88 BPM | HEIGHT: 62 IN | DIASTOLIC BLOOD PRESSURE: 90 MMHG | RESPIRATION RATE: 18 BRPM | OXYGEN SATURATION: 98 %

## 2023-08-05 PROCEDURE — 99282 EMERGENCY DEPT VISIT SF MDM: CPT

## 2023-08-05 PROCEDURE — 99283 EMERGENCY DEPT VISIT LOW MDM: CPT

## 2023-08-05 NOTE — ED ADULT NURSE NOTE - OBJECTIVE STATEMENT
57y female referred to ED by provider for librium. Pt is poor historian and states she is in the process of detoxing alcohol. States last drink was 3 weeks ago, but "according to my  three days ago." Pt has hx of withdrawal seizures about a week ago. On Keppra and topomax, daily. CIWA 0. Denies drug use. A&Ox4.

## 2023-08-05 NOTE — ED ADULT TRIAGE NOTE - CHIEF COMPLAINT QUOTE
Pt presents to ED for detox for alcohol withdrawal. Pt has an endorsement letter form [facility] a detox facility pt reports has been there last week for detox however pt reports she only spoke with a  and didn't receive any treatment. Pt A&Ox4. Pt is conversive in full sentences, pt is agitated in triage. Pt presents to ED for detox for alcohol withdrawal. Pt has an endorsement letter form [facility] detox facility for treatment for withdrawal, pt reports has been there last week for detox however pt reports she only spoke with a  and didn't receive any treatment. Pt has an appointment in the facility on Monday. Pt A&Ox4. Pt is conversive in full sentences, pt is agitated in triage.

## 2023-08-05 NOTE — ED PROVIDER NOTE - NSFOLLOWUPINSTRUCTIONS_ED_ALL_ED_FT
You were given a list of detox centers for your alcohol dependence. You are not in significant withdrawal at time of this evaluation, you do not require librium in the ER today. However, you should follow up with detox centers. Return for any worsening symptoms.     I hope you feel better soon!

## 2023-08-05 NOTE — ED PROVIDER NOTE - CLINICAL SUMMARY MEDICAL DECISION MAKING FREE TEXT BOX
58 yo F presenting requesting detox, mild withdrawal on exam, CIWA score of 2. No indication for benzos at this time. Discussed with patient that she will not be prescribed librium in ER as no significant withdrawal. Will give patient additional resources for detoxication programs, stable for discharge.

## 2023-08-05 NOTE — ED ADULT NURSE NOTE - CHIEF COMPLAINT QUOTE
Pt presents to ED for detox for alcohol withdrawal. Pt has an endorsement letter form [facility] a detox facility for treatment for withdrawal, pt reports has been there last week for detox however pt reports she only spoke with a  and didn't receive any treatment. Pt has an appointment in the facility on Monday. Pt A&Ox4. Pt is conversive in full sentences, pt is agitated in triage.

## 2023-08-05 NOTE — ED PROVIDER NOTE - OBJECTIVE STATEMENT
56 yo F with history of EtOH abuse presenting requesting detox. Pt went to [facility] yesterday. Per , pt went on tena on Thursday night, was still intoxicated while at detox center. Detox center told pt that they would not give her librium and she needs to go to ER. Has ho etoh withdrawal seizures. Pt feeling anxious, not sleeping well. Denies any medical complaints. No f/c/cp/sob/palpitations/abd pain/vomiting/d. Denies other substances. ROS as above.

## 2023-08-05 NOTE — ED PROVIDER NOTE - PATIENT PORTAL LINK FT
You can access the FollowMyHealth Patient Portal offered by [facility] by registering at the following website: [email or web address]. By joining [employer]’s FollowMyHealth portal, you will also be able to view your health information using other applications (apps) compatible with our system.

## 2023-08-05 NOTE — ED PROVIDER NOTE - PHYSICAL EXAMINATION
general: Well appearing, in no acute distress  HEENT: Normocephalic, atraumatic, extraocular movements intact  CV: Regular rate  Pulm: No respiratory distress, no tachypnea  Abd: Flat, no gross distension  Ext: warm and well perfused  Skin: No gross rashes or lesions  Neuro: Alert and oriented, moving all extremities, no hand tremors, no tongue fasiculations

## 2023-08-06 DIAGNOSIS — F10.239 ALCOHOL DEPENDENCE WITH WITHDRAWAL, UNSPECIFIED: ICD-10-CM

## 2023-08-06 DIAGNOSIS — F10.129 ALCOHOL ABUSE WITH INTOXICATION, UNSPECIFIED: ICD-10-CM

## 2023-09-05 ENCOUNTER — INPATIENT (INPATIENT)
Facility: HOSPITAL | Age: 58
LOS: 1 days | Discharge: HOME | DRG: 101 | End: 2023-09-07
Attending: STUDENT IN AN ORGANIZED HEALTH CARE EDUCATION/TRAINING PROGRAM | Admitting: INTERNAL MEDICINE
Payer: COMMERCIAL

## 2023-09-05 VITALS
HEIGHT: 62 IN | DIASTOLIC BLOOD PRESSURE: 92 MMHG | RESPIRATION RATE: 18 BRPM | TEMPERATURE: 98 F | SYSTOLIC BLOOD PRESSURE: 158 MMHG | WEIGHT: 130.07 LBS | HEART RATE: 70 BPM | OXYGEN SATURATION: 94 %

## 2023-09-05 LAB
ALBUMIN SERPL ELPH-MCNC: 4.3 G/DL — SIGNIFICANT CHANGE UP (ref 3.3–5)
ALP SERPL-CCNC: 73 U/L — SIGNIFICANT CHANGE UP (ref 40–120)
ALT FLD-CCNC: 17 U/L — SIGNIFICANT CHANGE UP (ref 10–45)
AMPHET UR-MCNC: NEGATIVE — SIGNIFICANT CHANGE UP
ANION GAP SERPL CALC-SCNC: 14 MMOL/L — SIGNIFICANT CHANGE UP (ref 5–17)
APAP SERPL-MCNC: <5 UG/ML — LOW (ref 10–30)
APPEARANCE UR: CLEAR — SIGNIFICANT CHANGE UP
AST SERPL-CCNC: 33 U/L — SIGNIFICANT CHANGE UP (ref 10–40)
BARBITURATES UR SCN-MCNC: NEGATIVE — SIGNIFICANT CHANGE UP
BASE EXCESS BLDV CALC-SCNC: 3.2 MMOL/L — HIGH (ref -2–3)
BASOPHILS # BLD AUTO: 0.05 K/UL — SIGNIFICANT CHANGE UP (ref 0–0.2)
BASOPHILS NFR BLD AUTO: 0.7 % — SIGNIFICANT CHANGE UP (ref 0–2)
BENZODIAZ UR-MCNC: NEGATIVE — SIGNIFICANT CHANGE UP
BILIRUB SERPL-MCNC: 0.4 MG/DL — SIGNIFICANT CHANGE UP (ref 0.2–1.2)
BILIRUB UR-MCNC: NEGATIVE — SIGNIFICANT CHANGE UP
BUN SERPL-MCNC: 6 MG/DL — LOW (ref 7–23)
CA-I SERPL-SCNC: 1.17 MMOL/L — SIGNIFICANT CHANGE UP (ref 1.15–1.33)
CALCIUM SERPL-MCNC: 9.6 MG/DL — SIGNIFICANT CHANGE UP (ref 8.4–10.5)
CHLORIDE SERPL-SCNC: 96 MMOL/L — SIGNIFICANT CHANGE UP (ref 96–108)
CK MB CFR SERPL CALC: 4 NG/ML — SIGNIFICANT CHANGE UP (ref 0–6.7)
CK SERPL-CCNC: 146 U/L — SIGNIFICANT CHANGE UP (ref 25–170)
CO2 BLDV-SCNC: 29.9 MMOL/L — HIGH (ref 22–26)
CO2 SERPL-SCNC: 26 MMOL/L — SIGNIFICANT CHANGE UP (ref 22–31)
COCAINE METAB.OTHER UR-MCNC: NEGATIVE — SIGNIFICANT CHANGE UP
COLOR SPEC: YELLOW — SIGNIFICANT CHANGE UP
CREAT SERPL-MCNC: 0.41 MG/DL — LOW (ref 0.5–1.3)
DIFF PNL FLD: NEGATIVE — SIGNIFICANT CHANGE UP
EGFR: 114 ML/MIN/1.73M2 — SIGNIFICANT CHANGE UP
EOSINOPHIL # BLD AUTO: 0.02 K/UL — SIGNIFICANT CHANGE UP (ref 0–0.5)
EOSINOPHIL NFR BLD AUTO: 0.3 % — SIGNIFICANT CHANGE UP (ref 0–6)
ETHANOL SERPL-MCNC: <10 MG/DL — SIGNIFICANT CHANGE UP (ref 0–10)
GAS PNL BLDV: 130 MMOL/L — LOW (ref 136–145)
GAS PNL BLDV: SIGNIFICANT CHANGE UP
GLUCOSE SERPL-MCNC: 144 MG/DL — HIGH (ref 70–99)
GLUCOSE UR QL: NEGATIVE — SIGNIFICANT CHANGE UP
HCO3 BLDV-SCNC: 28 MMOL/L — SIGNIFICANT CHANGE UP (ref 22–29)
HCT VFR BLD CALC: 35 % — SIGNIFICANT CHANGE UP (ref 34.5–45)
HGB BLD-MCNC: 11.4 G/DL — LOW (ref 11.5–15.5)
IMM GRANULOCYTES NFR BLD AUTO: 0.3 % — SIGNIFICANT CHANGE UP (ref 0–0.9)
KETONES UR-MCNC: 15 MG/DL
LEUKOCYTE ESTERASE UR-ACNC: NEGATIVE — SIGNIFICANT CHANGE UP
LYMPHOCYTES # BLD AUTO: 0.88 K/UL — LOW (ref 1–3.3)
LYMPHOCYTES # BLD AUTO: 12.4 % — LOW (ref 13–44)
MCHC RBC-ENTMCNC: 32.6 GM/DL — SIGNIFICANT CHANGE UP (ref 32–36)
MCHC RBC-ENTMCNC: 32.8 PG — SIGNIFICANT CHANGE UP (ref 27–34)
MCV RBC AUTO: 100.6 FL — HIGH (ref 80–100)
METHADONE UR-MCNC: NEGATIVE — SIGNIFICANT CHANGE UP
MONOCYTES # BLD AUTO: 0.88 K/UL — SIGNIFICANT CHANGE UP (ref 0–0.9)
MONOCYTES NFR BLD AUTO: 12.4 % — SIGNIFICANT CHANGE UP (ref 2–14)
NEUTROPHILS # BLD AUTO: 5.27 K/UL — SIGNIFICANT CHANGE UP (ref 1.8–7.4)
NEUTROPHILS NFR BLD AUTO: 73.9 % — SIGNIFICANT CHANGE UP (ref 43–77)
NITRITE UR-MCNC: NEGATIVE — SIGNIFICANT CHANGE UP
NRBC # BLD: 0 /100 WBCS — SIGNIFICANT CHANGE UP (ref 0–0)
OPIATES UR-MCNC: NEGATIVE — SIGNIFICANT CHANGE UP
PCO2 BLDV: 45 MMHG — HIGH (ref 39–42)
PCP SPEC-MCNC: SIGNIFICANT CHANGE UP
PCP UR-MCNC: NEGATIVE — SIGNIFICANT CHANGE UP
PH BLDV: 7.41 — SIGNIFICANT CHANGE UP (ref 7.32–7.43)
PH UR: 7 — SIGNIFICANT CHANGE UP (ref 5–8)
PHOSPHATE SERPL-MCNC: 2.5 MG/DL — SIGNIFICANT CHANGE UP (ref 2.5–4.5)
PLATELET # BLD AUTO: 331 K/UL — SIGNIFICANT CHANGE UP (ref 150–400)
PO2 BLDV: <33 MMHG — LOW (ref 25–45)
POTASSIUM BLDV-SCNC: 4.2 MMOL/L — SIGNIFICANT CHANGE UP (ref 3.5–5.1)
POTASSIUM SERPL-MCNC: 4.2 MMOL/L — SIGNIFICANT CHANGE UP (ref 3.5–5.3)
POTASSIUM SERPL-SCNC: 4.2 MMOL/L — SIGNIFICANT CHANGE UP (ref 3.5–5.3)
PROT SERPL-MCNC: 7.3 G/DL — SIGNIFICANT CHANGE UP (ref 6–8.3)
PROT UR-MCNC: NEGATIVE MG/DL — SIGNIFICANT CHANGE UP
RBC # BLD: 3.48 M/UL — LOW (ref 3.8–5.2)
RBC # FLD: 16.5 % — HIGH (ref 10.3–14.5)
SALICYLATES SERPL-MCNC: <0.3 MG/DL — LOW (ref 2.8–20)
SAO2 % BLDV: 26.1 % — LOW (ref 67–88)
SODIUM SERPL-SCNC: 136 MMOL/L — SIGNIFICANT CHANGE UP (ref 135–145)
SP GR SPEC: 1.01 — SIGNIFICANT CHANGE UP (ref 1–1.03)
THC UR QL: NEGATIVE — SIGNIFICANT CHANGE UP
UROBILINOGEN FLD QL: 0.2 E.U./DL — SIGNIFICANT CHANGE UP
WBC # BLD: 7.12 K/UL — SIGNIFICANT CHANGE UP (ref 3.8–10.5)
WBC # FLD AUTO: 7.12 K/UL — SIGNIFICANT CHANGE UP (ref 3.8–10.5)

## 2023-09-05 PROCEDURE — 99254 IP/OBS CNSLTJ NEW/EST MOD 60: CPT

## 2023-09-05 PROCEDURE — 99223 1ST HOSP IP/OBS HIGH 75: CPT | Mod: GC

## 2023-09-05 PROCEDURE — 73090 X-RAY EXAM OF FOREARM: CPT | Mod: 26,LT

## 2023-09-05 PROCEDURE — 73564 X-RAY EXAM KNEE 4 OR MORE: CPT | Mod: 26,RT

## 2023-09-05 PROCEDURE — 73080 X-RAY EXAM OF ELBOW: CPT | Mod: 26,LT

## 2023-09-05 PROCEDURE — 99285 EMERGENCY DEPT VISIT HI MDM: CPT

## 2023-09-05 PROCEDURE — 70450 CT HEAD/BRAIN W/O DYE: CPT | Mod: 26,MA

## 2023-09-05 PROCEDURE — 99222 1ST HOSP IP/OBS MODERATE 55: CPT

## 2023-09-05 PROCEDURE — 73060 X-RAY EXAM OF HUMERUS: CPT | Mod: 26,LT

## 2023-09-05 RX ORDER — KETOROLAC TROMETHAMINE 30 MG/ML
15 SYRINGE (ML) INJECTION ONCE
Refills: 0 | Status: DISCONTINUED | OUTPATIENT
Start: 2023-09-05 | End: 2023-09-05

## 2023-09-05 RX ORDER — LEVETIRACETAM 250 MG/1
500 TABLET, FILM COATED ORAL EVERY 12 HOURS
Refills: 0 | Status: DISCONTINUED | OUTPATIENT
Start: 2023-09-05 | End: 2023-09-07

## 2023-09-05 RX ORDER — FENTANYL CITRATE 50 UG/ML
50 INJECTION INTRAVENOUS ONCE
Refills: 0 | Status: DISCONTINUED | OUTPATIENT
Start: 2023-09-05 | End: 2023-09-05

## 2023-09-05 RX ORDER — SODIUM CHLORIDE 9 MG/ML
1000 INJECTION INTRAMUSCULAR; INTRAVENOUS; SUBCUTANEOUS ONCE
Refills: 0 | Status: COMPLETED | OUTPATIENT
Start: 2023-09-05 | End: 2023-09-05

## 2023-09-05 RX ORDER — POTASSIUM PHOSPHATE, MONOBASIC POTASSIUM PHOSPHATE, DIBASIC 236; 224 MG/ML; MG/ML
15 INJECTION, SOLUTION INTRAVENOUS ONCE
Refills: 0 | Status: DISCONTINUED | OUTPATIENT
Start: 2023-09-05 | End: 2023-09-05

## 2023-09-05 RX ORDER — FOLIC ACID 0.8 MG
1 TABLET ORAL ONCE
Refills: 0 | Status: COMPLETED | OUTPATIENT
Start: 2023-09-05 | End: 2023-09-05

## 2023-09-05 RX ORDER — CHLORHEXIDINE GLUCONATE 213 G/1000ML
1 SOLUTION TOPICAL
Refills: 0 | Status: DISCONTINUED | OUTPATIENT
Start: 2023-09-05 | End: 2023-09-07

## 2023-09-05 RX ORDER — MORPHINE SULFATE 50 MG/1
4 CAPSULE, EXTENDED RELEASE ORAL ONCE
Refills: 0 | Status: DISCONTINUED | OUTPATIENT
Start: 2023-09-05 | End: 2023-09-05

## 2023-09-05 RX ORDER — LEVETIRACETAM 250 MG/1
500 TABLET, FILM COATED ORAL ONCE
Refills: 0 | Status: COMPLETED | OUTPATIENT
Start: 2023-09-05 | End: 2023-09-05

## 2023-09-05 RX ORDER — THIAMINE MONONITRATE (VIT B1) 100 MG
100 TABLET ORAL ONCE
Refills: 0 | Status: COMPLETED | OUTPATIENT
Start: 2023-09-05 | End: 2023-09-05

## 2023-09-05 RX ORDER — INFLUENZA VIRUS VACCINE 15; 15; 15; 15 UG/.5ML; UG/.5ML; UG/.5ML; UG/.5ML
0.5 SUSPENSION INTRAMUSCULAR ONCE
Refills: 0 | Status: DISCONTINUED | OUTPATIENT
Start: 2023-09-05 | End: 2023-09-07

## 2023-09-05 RX ORDER — MAGNESIUM SULFATE 500 MG/ML
2 VIAL (ML) INJECTION ONCE
Refills: 0 | Status: COMPLETED | OUTPATIENT
Start: 2023-09-05 | End: 2023-09-05

## 2023-09-05 RX ORDER — HYDRALAZINE HCL 50 MG
10 TABLET ORAL ONCE
Refills: 0 | Status: COMPLETED | OUTPATIENT
Start: 2023-09-05 | End: 2023-09-05

## 2023-09-05 RX ADMIN — SODIUM CHLORIDE 1000 MILLILITER(S): 9 INJECTION INTRAMUSCULAR; INTRAVENOUS; SUBCUTANEOUS at 06:37

## 2023-09-05 RX ADMIN — Medication 10 MILLIGRAM(S): at 08:58

## 2023-09-05 RX ADMIN — Medication 1 MILLIGRAM(S): at 07:48

## 2023-09-05 RX ADMIN — Medication 25 GRAM(S): at 10:13

## 2023-09-05 RX ADMIN — FENTANYL CITRATE 50 MICROGRAM(S): 50 INJECTION INTRAVENOUS at 08:06

## 2023-09-05 RX ADMIN — Medication 15 MILLIGRAM(S): at 16:02

## 2023-09-05 RX ADMIN — Medication 15 MILLIGRAM(S): at 16:20

## 2023-09-05 RX ADMIN — LEVETIRACETAM 400 MILLIGRAM(S): 250 TABLET, FILM COATED ORAL at 06:57

## 2023-09-05 RX ADMIN — MORPHINE SULFATE 4 MILLIGRAM(S): 50 CAPSULE, EXTENDED RELEASE ORAL at 06:38

## 2023-09-05 RX ADMIN — Medication 1 MILLIGRAM(S): at 07:01

## 2023-09-05 RX ADMIN — Medication 100 MILLIGRAM(S): at 07:00

## 2023-09-05 RX ADMIN — Medication 85 MILLIMOLE(S): at 08:26

## 2023-09-05 RX ADMIN — Medication 1 MILLIGRAM(S): at 06:45

## 2023-09-05 RX ADMIN — FENTANYL CITRATE 50 MICROGRAM(S): 50 INJECTION INTRAVENOUS at 08:36

## 2023-09-05 RX ADMIN — FENTANYL CITRATE 50 MICROGRAM(S): 50 INJECTION INTRAVENOUS at 10:26

## 2023-09-05 RX ADMIN — Medication 1 MILLIGRAM(S): at 08:07

## 2023-09-05 RX ADMIN — FENTANYL CITRATE 50 MICROGRAM(S): 50 INJECTION INTRAVENOUS at 09:56

## 2023-09-05 RX ADMIN — LEVETIRACETAM 400 MILLIGRAM(S): 250 TABLET, FILM COATED ORAL at 17:59

## 2023-09-05 NOTE — ED PROVIDER NOTE - CLINICAL SUMMARY MEDICAL DECISION MAKING FREE TEXT BOX
57F PMH HTN, alcohol abuse, seizures p/w LOC/pain. Pt states she remembers playing on her computer then remembers being on the floor w/ her partner standing over her. Since then she has pain to aurelia, R knee and L elbow. No other systemic symptoms. Admits to alcohol a few hrs ago.   Per partner: He saw pt seated at computer - pt told him that she just couldn't sleep. He went to other room and then heard thud and then heard gurgling - went back into room and found pt supine on floor, generalized shaking, foaming at mouth, unresponsive, then called EMS. Last seizure ~2months ago. Pt states she is adherent to meds.   Last neuro visit w/ dr. Christian in June, pt is on keppra 500mg BID, and at that time also started topiramate qhs.   Vitals wnl, exam as above.  ddx: Likely seizure. Likely 2/2 chronic seizure/etoh use, clinically not in significant etoh withdrawal. Subsequent R knee, L elbow, forehead trauma. Possible fx.   Will give am keppra.   Labs, EKG, CTs, XR, symptom control, reassess.   Does not appear clinically intoxicated.

## 2023-09-05 NOTE — ED PROVIDER NOTE - OBJECTIVE STATEMENT
57F PMH HTN, alcohol abuse, seizures p/w LOC/pain. Pt states she remembers playing on her computer then remembers being on the floor w/ her partner standing over her. Since then she has pain to auerlia, R knee and L elbow. No other systemic symptoms. Admits to alcohol a few hrs ago.   Per partner: He saw pt seated at computer - pt told him that she just couldn't sleep. He went to other room and then heard thud and then heard gurgling - went back into room and found pt supine on floor, generalized shaking, foaming at mouth, unresponsive, then called EMS. Last seizure ~2months ago. Pt states she is adherent to meds.   Last neuro visit w/ dr. Christian in June, pt is on keppra 500mg BID, and at that time also started topiramate qhs.   Denies any preceding symptoms prior to event. Denies tongue pain, urinary incontinence, vision changes, focal weakness, focal numbness, neck pain, back pain, SOB, CP, abd pain, nausea, vomiting, diarrhea, black stool, bloody stool, urinary complaints, URI symptoms. Denies other MSK pain. Denies recent illnesses or medication changes. Not on AC.

## 2023-09-05 NOTE — H&P ADULT - SOCIAL HISTORY
Admission date: June 6, 2018     Discharge date: June 7, 2018    Admission diagnosis: End-stage osteoarthritis right hip    Discharge diagnosis: Status post right total hip arthroplasty     Brief summary of hospitalization course:  The patient is a pleasant 74 year-old male who underwent a right total hip arthroplasty on June 6, 2018. Following the procedure the patient was stable to the recovery room and subsequently admitted to the general orthopedic floor. Vital signs were monitored daily and the patient remained stable and afebrile throughout the course of  hospitalization.  The patient's pain was well-controlled with Tylenol. On postoperative day #1 the patient began working with physical therapy for functional mobility skills and was able to participate in therapy on a daily basis, and was able to demonstrate safe and independent mobility skills by the time of discharge.  Patient was given home dose of Coumadin for deep venous thrombosis prophylaxis and tolerated this well.  Overall the patient's hospitalization course was uneventful.    Discharge instructions:  Patient may be discharged to home today.  Continue Tylenol as needed for pain control.  Please keep the current dressing in place and clean, dry, and intact until two-week postop visit.  Continue home dose of Coumadin for deep venous thrombosis prophylaxis. Patient will follow-up with cardiology for PT/INR to monitor therapeutic response to Coumadin.  Home exercise program is to continue for functional mobility skills, range of motion, and strengthening exercises. Please schedule a followup visit with the patient in my office 2 weeks from the time of discharge. If there are any questions or concerns regarding the patient's postoperative course please do not hesitate to contact my office.       
Unable to obtain

## 2023-09-05 NOTE — ED PROVIDER NOTE - CARE PLAN
1 Principal Discharge DX:	Seizure  Secondary Diagnosis:	Alcohol use   Principal Discharge DX:	Seizure  Secondary Diagnosis:	Alcohol use  Secondary Diagnosis:	Left elbow fracture

## 2023-09-05 NOTE — PATIENT PROFILE ADULT - FALL HARM RISK - HARM RISK INTERVENTIONS

## 2023-09-05 NOTE — H&P ADULT - HISTORY OF PRESENT ILLNESS
Ms. Ko is a 57 y.o F with a PMHx of HTN, alcohol abuse, seizures p/w LOC/pain. History obtained primarily through ED documentation as well as through pt albeit somnolent. Per documentation, pt allegedly had fall at home this AM, was at home with her  when she fell from her computer. Pt was reportedly found on the floor with gurgling noises and shaking per her . Pt was unable to elucidate EtOH or illicit drug usage, however did admit to EtOH a few hours prior to presentation to ED providers. Pt also reported to be compliant with medications including antiepileptics. Of note, pt was here previously in ED on 8/5 for “detox”, and recently on 7/29 for fall 2/2 EtOH withdrawal. Pt also had admission in 7/27/2022 with Epilepsy with ICU consult at that time for presumed withdrawal seizures. Pt was d/c at that visit in 7/2022 to Epilepsy clinic with last neuro visit w/ dr. Cantrell in June of this year. Was placed on Keppra 500mg BID, and started on Topiramate 25mg qhs at bedtime for several weeks to attempt to reduce alcohol cravings. Per Dr. Cantrell's note on HIE, pt's recurrent seizures attributed due to ETOH withdrawal.      At bedside, pt somnolent s/p 2mg IV ativan and 4mg morphine after witnessed generalized tonic clonic seizure in ED, reportedly ~ 30 seconds. Pt unable to answer questions about drug/EtOH use, medication compliance, or ROS apart from pain in her L forearm.     ED Course:  ED Vitals: Initial T: 98.1, HR 70, /92, satting 94% on RA   Pertinent labs: Hgb/Hct 11.4/35 (.6), Na 136, K 4.2, Cl 96, CO2 26, BUN/Cr 9/0.37 respectively, Glucose 144   ABG: pH 7.41, CO2 29.2, PCO2 45, HCO3 28   CT Head: No acute intracranial hemorrhage or calvarial fracture. Moderate frontal scalp hematoma.  Left elbow XRAY: (resident read) potential nondisplaced fx of proximal L ulna   EKG: Normal sinus rhythm with normal axis, no ischemic changes, and QTC of 506   UTox: WNL

## 2023-09-05 NOTE — ED PROVIDER NOTE - PHYSICAL EXAMINATION
mild R anterior knee swelling/ecchymosis/ttp. No deformities. Mild decreased active ROM 2/2 pain but has full passive ROM.   L posterior elbow swelling/ttp/decreased ROM. Decreased pronation/supination. Normal ROM of shoulder/wrist.   normal cap refill, no distal forearm/proximal arm bony ttp. FROM all fingers/wrist. Sensation intact. Normal finger adduction/abduction. Normal finger opposition.   Large frontal forehead swelling w/ minimal central ecchymosis and minimal cental superficial abrasion. Localized ttp.  No trismus. Jaw FROM. Normal voice. No stridor/drooling. No bony ttp.   PERRL, EOMI, no nystagmus. No proptosis. Ears symmetrical. No mastoid ttp. Neck FROM. No nuchal rigidity. No pain w/ ear manipulation.   minimal tongue fasciculations.   No spinal ttp, neck FROM. Strength 5/5. No other bony ttp, FROM all other extremities. Normal equal distal pulses.  no tongue abrasions mild R anterior knee swelling/ecchymosis/ttp. No deformities. Mild decreased active ROM 2/2 pain but has full passive ROM.   L posterior elbow swelling/ttp/decreased ROM. Decreased pronation/supination. Normal ROM of shoulder/wrist.   normal cap refill, no distal forearm/proximal arm bony ttp. FROM all fingers/wrist. Sensation intact. Normal finger adduction/abduction. Normal finger opposition.   Large frontal forehead swelling w/ minimal central ecchymosis and minimal cental superficial abrasion. Localized ttp.  No trismus. Jaw FROM. Normal voice. No stridor/drooling. No bony ttp.   PERRL, EOMI, no nystagmus. No proptosis. Ears symmetrical. No mastoid ttp. Neck FROM. No nuchal rigidity. No pain w/ ear manipulation.   minimal tongue fasciculations.   No spinal ttp, neck FROM. Strength 5/5. No other bony ttp, FROM all other extremities. Normal equal distal pulses.  no tongue abrasions  steady gait w/ minimal assistance.

## 2023-09-05 NOTE — H&P ADULT - NSHPPHYSICALEXAM_GEN_ALL_CORE
PHYSICAL EXAM:  GENERAL: L Scalp hematoma, minimally responsive to questions. Protecting airway and verbalizing pain in L arm.   HEAD:  + L scalp hematoma with small, ~ .25cm area of skin abrasion on L forehead  EYES: EOMI, Pupils ~ 0.5mm and pinpoint   NECK: Supple, No JVD, Normal thyroid, no enlarged nodes  NERVOUS SYSTEM: No clonus in b/l lower extremities, observed moving all extremities against gravity. No tremors at rest.   CHEST/LUNG: B/L good air entry; No rales, rhonchi, or wheezing  HEART: S1S2 normal, no S3, Regular rhythm and + Tachycardic   ABDOMEN: Soft, Nontender, Nondistended; Bowel sounds present  EXTREMITIES:  2+ Peripheral Pulses, No clubbing, cyanosis, or edema  LYMPH: No lymphadenopathy noted  SKIN: No rashes or lesions

## 2023-09-05 NOTE — ED PROVIDER NOTE - PROGRESS NOTE DETAILS
Klepfish: Pt became unresponsive (while laying in stretcher) w/ generalized tonic/clonic shaking, foaming at mouth. Lasted ~30 seconds then resolved. Now slight snoring, confused. +Urinary incontinence. FSG wnl. Likely recurrent seizure. Given alcohol use (though exam not c/w w/ florrid withdrawal) will give ativan.   Pt DID NOT received PO keppra/librium. Will give IV keppra as well. ED w/u still pending. Klepfish: sleeping but arousable, mental status improving. More tachy, more hypertensive. Will treat for possible etoh withdrwawl seizures. Given total of 2 ativan. ICU consulted.   Pending: Imaging, urine results, lab results, ICU consult. Director - pt received from Dr Bates pending imaging, labs, icu eval/dispo.  Pt if imaging at this time.  VS, labs reviewed. ICU at bedside. Pt sedated but responds to painful stimuli. Airway patent. Reviewed XRs - she has left olecranon process fracture. Orthopedics consulted. Ortho consulted and will eval.  Pt c/o pain.  BP high.  Pt ordered for her am clonidine as well as additional ativan and pain meds. Accepted to ICU.

## 2023-09-05 NOTE — CONSULT NOTE ADULT - SUBJECTIVE AND OBJECTIVE BOX
Pt Name: MELODY MILLS  MRN: [id number]      Pt is a 52yo Female with PMHx of SEIZURE, ALCOHOL USE admitted s/p seizure witnessed at home.     History obtained from   ED Provider note:  · HPI Objective Statement: 57F PMH HTN, alcohol abuse, seizures p/w LOC/pain. Pt states she remembers playing on her computer then remembers being on the floor w/ her partner standing over her. Since then she has pain to aurelia, R knee and L elbow. No other systemic symptoms. Admits to alcohol a few hrs ago.   	Per partner: He saw pt seated at computer - pt told him that she just couldn't sleep. He went to other room and then heard thud and then heard gurgling - went back into room and found pt supine on floor, generalized shaking, foaming at mouth, unresponsive, then called EMS. Last seizure ~2months ago. Pt states she is adherent to meds.   	Last neuro visit w/ dr. Christian in June, pt is on keppra 500mg BID, and at that time also started topiramate qhs.   Denies any preceding symptoms prior to event. Denies tongue pain, urinary incontinence, vision changes, focal weakness, focal numbness, neck pain, back pain, SOB, CP, abd pain, nausea, vomiting, diarrhea, black stool, bloody stool, urinary complaints, URI symptoms. Denies other MSK pain. Denies recent illnesses or medication changes. Not on AC.    Patient arousable to tactile and painful stimuli.  Patient partner not at bedside at the time of assessment.      PAST MEDICAL & SURGICAL HISTORY:      Allergies:   Medications:  chlorhexidine 2% Cloths 1 Application(s) Topical <User Schedule>  LORazepam   Injectable 2 milliGRAM(s) IV Push every 2 hours PRN      Social History:  Ambulation: unable to assess      PHYSICAL EXAM:    T(C): 36.7 (09-05-23 @ 06:05), Max: 36.7 (09-05-23 @ 06:05)  HR: 83 (09-05-23 @ 08:57) (70 - 100)  BP: 160/89 (09-05-23 @ 08:57) (158/92 - 197/79)  RR: 17 (09-05-23 @ 08:57) (16 - 23)  SpO2: 99% (09-05-23 @ 08:57) (90% - 100%)  Wt(kg): --    Limited physical exam secondary to patients sedation related to pain medications and antiseizure medications  Gen:  sedated, no acute distress, arousable to painful and tactile stimuli,   head: visble hematoma to the left frontal lobe  Extremities: warm to touch, bilateral UE  Vascular:  +2 radial , brachial pulses  left elbow Skin: no rash on visible skin of the left elbow, skin intact to left elbow, moderate swelling to left elbow   Neuro- unable to assess  Musculoskeletal:  limited extension to the left UE due to pain   + palpable defect to the left elbow       Imaging Studies:  < from: Xray Forearm, Left (09.05.23 @ 07:43) >    COMPARISON: None available.    TECHNIQUE: 2 views of the left humerus, 3 views of the left elbow, 2   views of the left forearm    FINDINGS:  Acute comminuted predominantly transverse olecranon fracture with   approximately 2.7 cm displacement/retraction of the proximal fragments up   to the level of the distal humeral metaphysis. Coronoid process and   radial head. Intact.  Elbow joint spaces are anatomic in alignment.  Bony mineralization within normal limits.  No aggressive osseous neoplasm.  No radiopaque foreign body.    IMPRESSION:  Acute comminuted proximally displaced/retracted left olecranon fracture.      Right Knee Xray  --- End of Report ---    < end of copied text >  < from: Xray Knee 4 Views, Right (09.05.23 @ 07:43) >  INTERPRETATION:  EXAMINATION: XR KNEE 4 OR MORE VIEWS RIGHT    CLINICAL INDICATION: eval fx status post fall/seizure    COMPARISON: None    TECHNIQUE: AP neutral, AP external rotation, AP internal rotation and   lateral views of right knee .    INTERPRETATION: There is no acute fracture or dislocation. There is   minimal joint space narrowing in the medial compartment. Alignment is   preserved. There is a trace knee joint effusion.    IMPRESSION: No acute fracture or dislocation.    < end of copied text >    A/P: Ms. Mills is a 58 y.o F with a PMHx of HTN, alcohol abuse, seizures p/w LOC/pain in the setting of fall at home due to presumed EtOH withdrawal vs epileptic seizure with a left olecranon fracture    Primary care as per ICU team  Pain control  post splint Left elbow Ap and lateral xray  Left elbow posterior splint applied -   NWANDER TEJEDA in a sling at all times  discussed plan of care with Dr. Gill- would like to take patient to surgery as soon as patient is medically stable for Left Olecranon ORIF        Pt Name: MELODY MILLS  MRN: [id number]    History obtained from ED Provider note due to patient being sedated and unable to obtain hx:    Pt is a 59yo Female with PMHx of SEIZURE, ALCOHOL USE admitted s/p seizure witnessed at home. PMH HTN, alcohol abuse, seizures p/w LOC/pain. Pt states she remembers playing on her computer then remembers being on the floor w/ her partner standing over her. Since then she has pain to aurelia, R knee and L elbow. No other systemic symptoms. Admits to alcohol a few hrs ago. Per partner: He saw pt seated at computer - pt told him that she just couldn't sleep. He went to other room and then heard thud and then heard gurgling - went back into room and found pt supine on floor, generalized shaking, foaming at mouth, unresponsive, then called EMS. Last seizure ~2months ago. Pt states she is adherent to meds. Last neuro visit w/ dr. Christian in June, pt is on keppra 500mg BID, and at that time also started topiramate qhs. Denies any preceding symptoms prior to event. Denies tongue pain, urinary incontinence, vision changes, focal weakness, focal numbness, neck pain, back pain, SOB, CP, abd pain, nausea, vomiting, diarrhea, black stool, bloody stool, urinary complaints, URI symptoms. Denies other MSK pain. Denies recent illnesses or medication changes. Not on AC.    Patient arousable to tactile and painful stimuli.  Patient partner not at bedside at the time of assessment.      PAST MEDICAL & SURGICAL HISTORY:  ETOH abuse  seizures  HTN      Medications:  chlorhexidine 2% Cloths 1 Application(s) Topical <User Schedule>  LORazepam   Injectable 2 milliGRAM(s) IV Push every 2 hours PRN      PHYSICAL EXAM:    T(C): 36.7 (09-05-23 @ 06:05), Max: 36.7 (09-05-23 @ 06:05)  HR: 83 (09-05-23 @ 08:57) (70 - 100)  BP: 160/89 (09-05-23 @ 08:57) (158/92 - 197/79)  RR: 17 (09-05-23 @ 08:57) (16 - 23)  SpO2: 99% (09-05-23 @ 08:57) (90% - 100%)  Wt(kg): --    PE: Limited physical exam secondary to patient sedation related to pain medications and antiseizure medications  Gen:  sedated, no acute distress, arousable to painful and tactile stimuli,   Head: visible hematoma to the left frontal lobe  Neuro- unable to assess to patient status  Extremities: warm to touch, bilateral UE  Vascular:  +2 radial , brachial pulses  Musculoskeletal: Left elbow Skin- skin intact, cap refill < 2 sec,  hands warm.  Moderate swelling at left elbow region, elbow in fully flexed position with sling on, no rash/ecchymosis. Palpable defect to the left elbow, moderate ttp at left olecranon region, no bony tenderness to radius/ulna/humerus. Slt intact (unable to fully access but did feel palpation and movement of elbow with moderate pain),  biceps/triceps/delts, , finger int- unable to test 2/2 to fx  ROM- not fully tested due to fx but patient able to extend to 70 deg for splint placement  Pt. resisting not to extend elbow, but then was arousable telling us "to stop." Explained to patient she fractured elbow and placing splint on. Pt. aware and more compliant at this time and able to get splint placed. Pain medications  were given.           Imaging Studies:  < from: Xray Forearm, Left (09.05.23 @ 07:43) >    COMPARISON: None available.    TECHNIQUE: 2 views of the left humerus, 3 views of the left elbow, 2   views of the left forearm    FINDINGS:  Acute comminuted predominantly transverse olecranon fracture with   approximately 2.7 cm displacement/retraction of the proximal fragments up   to the level of the distal humeral metaphysis. Coronoid process and   radial head. Intact.  Elbow joint spaces are anatomic in alignment.  Bony mineralization within normal limits.  No aggressive osseous neoplasm.  No radiopaque foreign body.    IMPRESSION:  Acute comminuted proximally displaced/retracted left olecranon fracture.      Right Knee Xray    < from: Xray Knee 4 Views, Right (09.05.23 @ 07:43) >  INTERPRETATION:  EXAMINATION: XR KNEE 4 OR MORE VIEWS RIGHT    CLINICAL INDICATION: eval fx status post fall/seizure    COMPARISON: None    TECHNIQUE: AP neutral, AP external rotation, AP internal rotation and   lateral views of right knee .    INTERPRETATION: There is no acute fracture or dislocation. There is   minimal joint space narrowing in the medial compartment. Alignment is   preserved. There is a trace knee joint effusion.    IMPRESSION: No acute fracture or dislocation.        A/P:  59yo F  s/p fall with a left olecranon fracture from possible ETOH withdrawal and seizure     Primary care as per ICU team  Pain control  Left elbow posterior splint applied   NWB LUE in a sling at all times. Keep left arm elevated at all times.   Left elbow xray s/p splint placement  Discussed plan of care with Dr. Gill- would like to take patient to surgery as soon as patient is medically stable for Left Olecranon ORIF   Ortho to follow

## 2023-09-05 NOTE — CONSULT NOTE ADULT - SUBJECTIVE AND OBJECTIVE BOX
HPI:  Ms. Ko is a 57 y.o F with a PMHx of HTN, alcohol abuse, seizures has been brought to ED following a fall and seizure witnessed by . History obtained from  at bedside. He states that around 4:00 am this morning he found the patient sitting on the high stool in kitchen saying she couldn't sleep. After few minutes when he was in other room, heard a noise and gurgling sound. He ran to her and found her on the floor, shaking, foaming from mouth lasting 1-2 minutes.     PAST MEDICAL & SURGICAL HISTORY:  HTN (hypertension)    Epilepsy    ETOH abuse    No significant past surgical history            Medications:  chlorhexidine 2% Cloths 1 Application(s) Topical <User Schedule>  influenza   Vaccine 0.5 milliLiter(s) IntraMuscular once  LORazepam   Injectable 2 milliGRAM(s) IV Push every 1 hour PRN      Vital Signs Last 24 Hrs  T(C): 36.7 (05 Sep 2023 06:05), Max: 36.7 (05 Sep 2023 06:05)  T(F): 98.1 (05 Sep 2023 06:05), Max: 98.1 (05 Sep 2023 06:05)  HR: 92 (05 Sep 2023 12:00) (70 - 110)  BP: 123/61 (05 Sep 2023 12:00) (100/66 - 197/79)  BP(mean): 86 (05 Sep 2023 12:00) (86 - 95)  RR: 13 (05 Sep 2023 12:00) (13 - 23)  SpO2: 98% (05 Sep 2023 12:00) (90% - 100%)    Parameters below as of 05 Sep 2023 13:00  Patient On (Oxygen Delivery Method): room air        Neurological Exam:   Mental status: Awake, alert and oriented x3.  Recent and remote memory intact.  Naming, repetition and comprehension intact.  Attention/concentration intact.  No dysarthria, no aphasia.  Fund of knowledge appropriate.    Cranial nerves: Pupils equally round and reactive to light, visual fields full, no nystagmus, extraocular muscles intact, V1 through V3 intact bilaterally and symmetric, face symmetric, hearing intact to finger rub, palate elevation symmetric, tongue was midline.  Motor:  MRC grading 5/5 b/l UE/LE.   strength 5/5.  Normal tone and bulk.  No abnormal movements.    Sensation: Intact to light touch, proprioception, and pinprick.   Coordination: No dysmetria on finger-to-nose and heel-to-shin.  No dysdiadokinesia.  Reflexes: 2+ in bilateral UE/LE, downgoing toes bilaterally. (-) Crowe.  Gait: Narrow and steady. No ataxia.  Romberg negative    Labs:  CBC Full  -  ( 05 Sep 2023 06:46 )  WBC Count : 7.12 K/uL  RBC Count : 3.48 M/uL  Hemoglobin : 11.4 g/dL  Hematocrit : 35.0 %  Platelet Count - Automated : 331 K/uL  Mean Cell Volume : 100.6 fl  Mean Cell Hemoglobin : 32.8 pg  Mean Cell Hemoglobin Concentration : 32.6 gm/dL  Auto Neutrophil # : 5.27 K/uL  Auto Lymphocyte # : 0.88 K/uL  Auto Monocyte # : 0.88 K/uL  Auto Eosinophil # : 0.02 K/uL  Auto Basophil # : 0.05 K/uL  Auto Neutrophil % : 73.9 %  Auto Lymphocyte % : 12.4 %  Auto Monocyte % : 12.4 %  Auto Eosinophil % : 0.3 %  Auto Basophil % : 0.7 %        136  |  96  |  6<L>  ----------------------------<  144<H>  4.2   |  26  |  0.41<L>    Ca    9.6      05 Sep 2023 06:46  Phos  2.5       Mg     1.6         TPro  7.3  /  Alb  4.3  /  TBili  0.4  /  DBili  x   /  AST  33  /  ALT  17  /  AlkPhos  73  09-05    LIVER FUNCTIONS - ( 05 Sep 2023 06:46 )  Alb: 4.3 g/dL / Pro: 7.3 g/dL / ALK PHOS: 73 U/L / ALT: 17 U/L / AST: 33 U/L / GGT: x             Urinalysis Basic - ( 05 Sep 2023 06:46 )    Color: Yellow / Appearance: Clear / S.015 / pH: x  Gluc: 144 mg/dL / Ketone: 15 mg/dL  / Bili: Negative / Urobili: 0.2 E.U./dL   Blood: x / Protein: NEGATIVE mg/dL / Nitrite: NEGATIVE   Leuk Esterase: NEGATIVE / RBC: x / WBC x   Sq Epi: x / Non Sq Epi: x / Bacteria: x        Assessment:  This is a 58y Female w/ h/o     Plan: HPI:  Ms. Ko is a 57 y.o F with a PMHx of HTN, alcohol abuse, seizures has been brought to ED following a fall and seizure witnessed by . History obtained from  at bedside. He states that around 4:00 am this morning he found the patient sitting on the high stool in kitchen saying she couldn't sleep. After few minutes when he was in other room, heard a noise and gurgling sound. He ran to her and found her on the floor, shaking, foaming from mouth lasting 1-2 minutes. Then she became very confused. Patient had another seizure after bringing to ED. Patient has H/O multiple seizure in the past, all in setting of alcohol intoxication/withdrawal. She has been in an and out of detox program. She was seen by Epilepsy in hospital last  and has been following with Dr. Cantrell. During last visit in  she was advised to continue Keppra 500 mg BID and start TOpiramate 25 mg QHS to help with alcohol craving. She hasn't been compliant with them until 1 week ago. He also states she drank a glass of diluted wine yesterday evening, but he found a empty bottle of alcohol in house.     PAST MEDICAL & SURGICAL HISTORY:  HTN (hypertension)    Epilepsy    ETOH abuse    No significant past surgical history            Medications:  chlorhexidine 2% Cloths 1 Application(s) Topical <User Schedule>  influenza   Vaccine 0.5 milliLiter(s) IntraMuscular once  LORazepam   Injectable 2 milliGRAM(s) IV Push every 1 hour PRN      Vital Signs Last 24 Hrs  T(C): 36.7 (05 Sep 2023 06:05), Max: 36.7 (05 Sep 2023 06:05)  T(F): 98.1 (05 Sep 2023 06:05), Max: 98.1 (05 Sep 2023 06:05)  HR: 92 (05 Sep 2023 12:00) (70 - 110)  BP: 123/61 (05 Sep 2023 12:00) (100/66 - 197/79)  BP(mean): 86 (05 Sep 2023 12:00) (86 - 95)  RR: 13 (05 Sep 2023 12:00) (13 - 23)  SpO2: 98% (05 Sep 2023 12:00) (90% - 100%)    Parameters below as of 05 Sep 2023 13:00  Patient On (Oxygen Delivery Method): room air        Neurological Exam:   Mental status: Obtunded, responds to verbal stimuli and sternal rub,   Cranial nerves: Pupils pinpoint , blink to threat present B/L. Face symmetric.   Motor: All extremities are antigravity except LUE  ( pain limited due to fracture) Normal tone and bulk.  No abnormal movements.    Sensation: Intact to noxious stimuli  Coordination: unable to assess  Reflexes: 2+ in bilateral UE/LE, downgoing toes bilaterally.       Labs:  CBC Full  -  ( 05 Sep 2023 06:46 )  WBC Count : 7.12 K/uL  RBC Count : 3.48 M/uL  Hemoglobin : 11.4 g/dL  Hematocrit : 35.0 %  Platelet Count - Automated : 331 K/uL  Mean Cell Volume : 100.6 fl  Mean Cell Hemoglobin : 32.8 pg  Mean Cell Hemoglobin Concentration : 32.6 gm/dL  Auto Neutrophil # : 5.27 K/uL  Auto Lymphocyte # : 0.88 K/uL  Auto Monocyte # : 0.88 K/uL  Auto Eosinophil # : 0.02 K/uL  Auto Basophil # : 0.05 K/uL  Auto Neutrophil % : 73.9 %  Auto Lymphocyte % : 12.4 %  Auto Monocyte % : 12.4 %  Auto Eosinophil % : 0.3 %  Auto Basophil % : 0.7 %        136  |  96  |  6<L>  ----------------------------<  144<H>  4.2   |  26  |  0.41<L>    Ca    9.6      05 Sep 2023 06:46  Phos  2.5     -  Mg     1.6         TPro  7.3  /  Alb  4.3  /  TBili  0.4  /  DBili  x   /  AST  33  /  ALT  17  /  AlkPhos  73  09-05    LIVER FUNCTIONS - ( 05 Sep 2023 06:46 )  Alb: 4.3 g/dL / Pro: 7.3 g/dL / ALK PHOS: 73 U/L / ALT: 17 U/L / AST: 33 U/L / GGT: x             Urinalysis Basic - ( 05 Sep 2023 06:46 )    Color: Yellow / Appearance: Clear / S.015 / pH: x  Gluc: 144 mg/dL / Ketone: 15 mg/dL  / Bili: Negative / Urobili: 0.2 E.U./dL   Blood: x / Protein: NEGATIVE mg/dL / Nitrite: NEGATIVE   Leuk Esterase: NEGATIVE / RBC: x / WBC x   Sq Epi: x / Non Sq Epi: x / Bacteria: x       HPI:  Ms. Ko is a 57 y.o F with a PMHx of HTN, alcohol abuse, seizures has been brought to ED following a fall and seizure witnessed by . History obtained from  at bedside. He states that around 4:00 am this morning he found the patient sitting on the high stool in kitchen saying she couldn't sleep. After few minutes when he was in other room, heard a noise and gurgling sound. He ran to her and found her on the floor, shaking, foaming from mouth lasting 1-2 minutes. Then she became very confused. Patient had another seizure after bringing to ED. Patient has H/O multiple seizure in the past, all in setting of alcohol intoxication/withdrawal. She has been in an and out of detox program. She was seen by Epilepsy in hospital last  and has been following with Dr. Cantrell. During last visit in  she was advised to continue Keppra 500 mg BID and start TOpiramate 25 mg QHS to help with alcohol craving. She hasn't been compliant with them until 1 week ago. He also states she drank a glass of diluted wine yesterday evening, but he found a empty bottle of alcohol in house.     PAST MEDICAL & SURGICAL HISTORY:  HTN (hypertension)    Epilepsy    ETOH abuse    No significant past surgical history      Medications:  chlorhexidine 2% Cloths 1 Application(s) Topical <User Schedule>  influenza   Vaccine 0.5 milliLiter(s) IntraMuscular once  LORazepam   Injectable 2 milliGRAM(s) IV Push every 1 hour PRN      Vital Signs Last 24 Hrs  T(C): 36.7 (05 Sep 2023 06:05), Max: 36.7 (05 Sep 2023 06:05)  T(F): 98.1 (05 Sep 2023 06:05), Max: 98.1 (05 Sep 2023 06:05)  HR: 92 (05 Sep 2023 12:00) (70 - 110)  BP: 123/61 (05 Sep 2023 12:00) (100/66 - 197/79)  BP(mean): 86 (05 Sep 2023 12:00) (86 - 95)  RR: 13 (05 Sep 2023 12:00) (13 - 23)  SpO2: 98% (05 Sep 2023 12:00) (90% - 100%)    Parameters below as of 05 Sep 2023 13:00  Patient On (Oxygen Delivery Method): room air        Neurological Exam:   Mental status: Obtunded, responds to verbal stimuli and sternal rub,   Cranial nerves: Pupils pinpoint , blink to threat present B/L. Face symmetric.   Motor: All extremities are antigravity except LUE  ( pain limited due to fracture) Normal tone and bulk.  No abnormal movements.    Sensation: Intact to noxious stimuli  Coordination: unable to assess  Reflexes: 2+ in bilateral UE/LE, downgoing toes bilaterally.       Labs:  CBC Full  -  ( 05 Sep 2023 06:46 )  WBC Count : 7.12 K/uL  RBC Count : 3.48 M/uL  Hemoglobin : 11.4 g/dL  Hematocrit : 35.0 %  Platelet Count - Automated : 331 K/uL  Mean Cell Volume : 100.6 fl  Mean Cell Hemoglobin : 32.8 pg  Mean Cell Hemoglobin Concentration : 32.6 gm/dL  Auto Neutrophil # : 5.27 K/uL  Auto Lymphocyte # : 0.88 K/uL  Auto Monocyte # : 0.88 K/uL  Auto Eosinophil # : 0.02 K/uL  Auto Basophil # : 0.05 K/uL  Auto Neutrophil % : 73.9 %  Auto Lymphocyte % : 12.4 %  Auto Monocyte % : 12.4 %  Auto Eosinophil % : 0.3 %  Auto Basophil % : 0.7 %        136  |  96  |  6<L>  ----------------------------<  144<H>  4.2   |  26  |  0.41<L>    Ca    9.6      05 Sep 2023 06:46  Phos  2.5     -  Mg     1.6         TPro  7.3  /  Alb  4.3  /  TBili  0.4  /  DBili  x   /  AST  33  /  ALT  17  /  AlkPhos  73  09-05    LIVER FUNCTIONS - ( 05 Sep 2023 06:46 )  Alb: 4.3 g/dL / Pro: 7.3 g/dL / ALK PHOS: 73 U/L / ALT: 17 U/L / AST: 33 U/L / GGT: x             Urinalysis Basic - ( 05 Sep 2023 06:46 )    Color: Yellow / Appearance: Clear / S.015 / pH: x  Gluc: 144 mg/dL / Ketone: 15 mg/dL  / Bili: Negative / Urobili: 0.2 E.U./dL   Blood: x / Protein: NEGATIVE mg/dL / Nitrite: NEGATIVE   Leuk Esterase: NEGATIVE / RBC: x / WBC x   Sq Epi: x / Non Sq Epi: x / Bacteria: x

## 2023-09-05 NOTE — ED ADULT NURSE NOTE - OBJECTIVE STATEMENT
Patient to the ED s/p GLF r/t witnessed seizure PTA. Partner reports patient has been drinking ETOH tonight and seized in the kitchen, endorses hx of daily etoh and seizures. No post-ictal state upon arrival. C/O bilateral knee pain, left elbow pain and hematoma to forehead. -Thinners, AAOX4, NAD.

## 2023-09-05 NOTE — CONSULT NOTE ADULT - ASSESSMENT
Ms Ko is a 57 year old female with H/O EtOH withdrawal seizure admitted for witnessed seizure and fall. Her exam is limited due to recently received Ativan and Morphine. EEG currently showing no seizure. She had multiple ED visit for either seizure/ fall related to EtOH intoxication. Currently it seems to be another alcohol intoxication/withdrawal seizure in setting of poor compliance.     Recommendations:  - Continue Keppra 500 mg BID for now  - Will f/u EEG  - CIWA protocol.  - Seizure precaution and delirium precaution.  - Epilepsy team will follow.

## 2023-09-05 NOTE — PATIENT PROFILE ADULT - IS THERE A SUSPICION OF ABUSE/NEGLIGENCE?
Chest tube secured and cont suction  Daily CXR  Blood cultures no growth  Sputum cultures rare gram + cocci and gram - rods  Continue zosyn  Follow fever curve and repeat CBC in AM   no

## 2023-09-05 NOTE — H&P ADULT - NSHPLABSRESULTS_GEN_ALL_CORE
LABS:                         11.4   7.12  )-----------( 331      ( 05 Sep 2023 06:46 )             35.0         136  |  96  |  6<L>  ----------------------------<  144<H>  4.2   |  26  |  0.41<L>    Ca    9.6      05 Sep 2023 06:46  Phos  2.5       Mg     1.6         TPro  7.3  /  Alb  4.3  /  TBili  0.4  /  DBili  x   /  AST  33  /  ALT  17  /  AlkPhos  73  09-      Urinalysis Basic - ( 05 Sep 2023 06:46 )    Color: Yellow / Appearance: Clear / S.015 / pH: x  Gluc: 144 mg/dL / Ketone: 15 mg/dL  / Bili: Negative / Urobili: 0.2 E.U./dL   Blood: x / Protein: NEGATIVE mg/dL / Nitrite: NEGATIVE   Leuk Esterase: NEGATIVE / RBC: x / WBC x   Sq Epi: x / Non Sq Epi: x / Bacteria: x      CARDIAC MARKERS ( 05 Sep 2023 06:46 )  x     / x     / 146 U/L / x     / 4.0 ng/mL            RADIOLOGY, EKG & ADDITIONAL TESTS: Reviewed.

## 2023-09-05 NOTE — ED PROVIDER NOTE - NSFOLLOWUPINSTRUCTIONS_ED_ALL_ED_FT
Can take tylenol 650mg every 6hrs as needed for pain.    Take seizure medications as prescribed.    Stay well hydrated.    Follow up with primary doctor within 1-2 days.     Follow up with neurologist!! Can call [phone] to schedule appointment.     DO NOT perform any activities during which if you have another seizure it can be dangerous (i.e. driving, operating machinery).    Return to ER sooner for fevers, persistent vomit, uncontrolled pain, worsening breathing, worsening lightheaded, worsening shaking.    Alcohol is harmful to your health!     Alcohol Abuse    Alcohol intoxication occurs when the amount of alcohol that a person has consumed impairs his or her ability to mentally and physically function. Chronic alcohol consumption can also lead to a variety of health issues including neurological disease, stomach disease, heart disease, liver disease, etc. Do not drive after drinking alcohol. Drinking enough alcohol to end up in an Emergency Room suggests you may have an alcohol abuse problem. Seek help at a drug addiction center.    SEEK IMMEDIATE MEDICAL CARE IF YOU HAVE ANY OF THE FOLLOWING SYMPTOMS: seizures, vomiting blood, blood in your stool, lightheadedness/dizziness, or becoming shaky to tremulous when you stop drinking.     Seizure    A seizure is abnormal electrical activity in the brain; the specific cause may or may not be found. Prior to a seizure you may experience a warning sensation (aura) that may include fear, nausea, dizziness, and visual changes such as flashing lights of spots. Common symptoms during the seizure may include an altered mental status, rhythmic jerking movements, drooling, grunting, loss of bladder or bowel control, or tongue biting. After a seizure, you may feel confused and sleepy.     Do not swim, drive, operate machinery, or engage in any risky activity during which a seizure could cause further injury to you or others. Teach friends and family what to do if you HAVE a seizure which includes laying you on the ground with your head on a cushion and turning you to the side to keep your breathing passages clear in case of vomiting.    SEEK IMMEDIATE MEDICAL CARE IF YOU HAVE ANY OF THE FOLLOWING SYMPTOMS: seizure lasting over 5 minutes, not waking up or persistent altered mental status after the seizure, or more frequent or worsening seizures.

## 2023-09-05 NOTE — CONSULT NOTE ADULT - ASSESSMENT
Ms. Ko is a 57 y.o F with a PMHx of HTN, alcohol abuse, seizures p/w LOC/pain in the setting of fall at home due to presumed EtOH withdrawal vs epileptic seizure     Neuro:   #AMS #EtOH withdrawal seizure   Pt with AMS with previous ED visits and admissions in the setting of EtOH withdrawal. Pt with neg EtOH in [facility]ox, was reportedly found this AM with bottle of liquor per  who was not at bedside. Pt s/p 3mg IV ativan, Keppra 500mg IVP @ 0600, and Morphine 4mg @ 0600. Sedation may be in the setting of EtOH withdrawal, post-ictal state, polypharmacy.   Plan:   - Recommending IV Keppra at this time  - Ativan 2mg Q1h PRN for CIWA >8   - CIWA protocol Q4h   - Add on TSH, consider Neurology consult for potential vEEG monitoring   - C/w high dose Thiamine & Folic acid     Pulm:   # Shortness of breath  Pt never hypoxic in ED, most recent O2 (off nasal cannula) 92%.   Plan:   - Given degree of sedation, recommending continuing on 2L NC for now. Wean as tolerated  - If desatting < 92% or if not protecting airway, recommending intubation     Cardiovascular:  # Severe asymptomatic hypertension   Pt intermittently hypertensive to 200/100 in ED. Pt with hx of HTN at home (on Clonidine 0.1mg at home). Now s/p 10mg of Hydralazine and 50mcg of Fentanyl in ED -> most recent /87. Labile BP may be in the setting of agitation 2/2 EtOH withdrawal vs pain in the setting of possible L forearm fracture.   Plan:   - C/w home Clonidine vs Precedex     # Prolonged QTC  Pt had QTC of 506 in ED. Not on any QTC prolonging drugs.   Plan:   - F/u with serum Mg level   - Repleting NaPhos   - Replete electrolytes PRN     Endocrine:  BASHIR   Plan:   - F/u with TSH add on     GI:  NPO for now     :   BASHIR     Renal:   BASHIR     ID:   Pt afebrile at this time, no cough or infectious sx. No leukocytosis.   Plan:   - If pt desats further, obtain CXR  - Caution for aspiration     Heme/Onc:  #Macrocytic anemia   Plan:   - Presumed d/2 B12/Folate deficiency   - Recommending high dose B12 and folate supplementation at this time   - H&H lateral from previous admissions dating back to 7/2022     Misc: F: NPO   E: replete PRN Mg <2, K > 4  DVT: lovenox   N: NPO  GI: none  Code Status: full code  Dispo: ICU    Ms. Ko is a 57 y.o F with a PMHx of HTN, alcohol abuse, seizures p/w LOC/pain in the setting of fall at home due to presumed EtOH withdrawal vs epileptic seizure     Neuro:   #AMS #EtOH withdrawal seizure   Pt with AMS with previous ED visits and admissions in the setting of EtOH withdrawal. Pt with neg EtOH in [facility]ox, was reportedly found this AM with bottle of liquor per  who was not at bedside. Pt s/p 3mg IV ativan, Keppra 500mg IVP @ 0600, and Morphine 4mg @ 0600. Sedation may be in the setting of EtOH withdrawal, post-ictal state, polypharmacy.   Plan:   - Recommending IV Keppra at this time, Keppra 500mg Q12h   - Ativan 2mg Q1h PRN for CIWA >8   - CIWA protocol Q4h, consider librium taper   - Add on TSH, f/u with Keppra level add on   - Begin vEEG, if any abnormalities, consider Neurology consult for potential epileptiform seizures   - C/w high dose Thiamine & Folic acid     Pulm:   # Shortness of breath  Pt never hypoxic in ED, most recent O2 (off nasal cannula) 92%.   Plan:   - Given degree of sedation, recommending continuing on 2L NC for now. Wean as tolerated  - If desatting < 92% or if not protecting airway, recommending intubation     Cardiovascular:  # Severe asymptomatic hypertension   Pt intermittently hypertensive to 200/100 in ED. Pt with hx of HTN at home (on Clonidine 0.1mg at home). Now s/p 10mg of Hydralazine and 50mcg of Fentanyl in ED -> most recent /87. Labile BP may be in the setting of agitation 2/2 EtOH withdrawal vs pain in the setting of possible L forearm fracture.   Plan:   - C/w home Clonidine vs Precedex     # Prolonged QTC  Pt had QTC of 506 in ED. Not on any QTC prolonging drugs.   Plan:   - F/u with serum Mg level   - Repleting NaPhos   - Replete electrolytes PRN     Endocrine:  BASHIR   Plan:   - F/u with TSH add on     MSK:   #Left proximal forearm fx?   Pt s/p XRAY of forearm, pending official read. Resident impression, possible fx at proximal ulna.   Plan:   - F/u with Ortho consult    GI:  NPO for now     :   BASHIR     Renal:   BASHIR     ID:   Pt afebrile at this time, no cough or infectious sx. No leukocytosis.   Plan:   - If pt desats further, obtain CXR  - Caution for aspiration     Heme/Onc:  #Macrocytic anemia   Plan:   - Presumed d/2 B12/Folate deficiency   - Recommending high dose B12 and folate supplementation at this time   - H&H lateral from previous admissions dating back to 7/2022     Misc: F: NPO   E: replete PRN Mg <2, K > 4  DVT: lovenox   N: NPO  GI: none  Code Status: full code  Dispo: ICU    Ms. Ko is a 57 y.o F with a PMHx of HTN, alcohol abuse, seizures p/w LOC/pain in the setting of fall at home due to presumed EtOH withdrawal vs epileptic seizure     Neuro:   #AMS #EtOH withdrawal seizure   Pt with AMS with previous ED visits and admissions in the setting of EtOH withdrawal. Pt with neg EtOH in [facility], was reportedly found this AM with bottle of liquor per  who was not at bedside. Pt s/p 3mg IV ativan, Keppra 500mg IVP @ 0600, and Morphine 4mg @ 0600. Sedation may be in the setting of EtOH withdrawal, post-ictal state, polypharmacy. Per HIE, pt last saw her epilepsy physician Dr. 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 Per documentation, pt was trialed on Topiramate 25mg at bedtime for several weeks to attempt to cut alcohol craving at that time.   Plan:  - Recommending IV Keppra at this time, c/w Keppra 500mg Q12h. Can transition to PO once pt tolerating.   - Ativan 2mg Q1h PRN for CIWA >8   - CIWA protocol Q4h, consider librium taper   - Add on TSH, f/u with Keppra level add on   - Begin vEEG, if any abnormalities, consider Neurology consult for potential epileptiform seizures   - C/w high dose Thiamine & Folic acid     Pulm:   # Shortness of breath  Pt never hypoxic in ED, most recent O2 (off nasal cannula) 92%.   Plan:   - Given degree of sedation, recommending continuing on 2L NC for now. Wean as tolerated  - If desatting < 92% or if not protecting airway, recommending intubation     Cardiovascular:  # Severe asymptomatic hypertension   Pt intermittently hypertensive to 200/100 in ED. Pt with hx of HTN at home (on Clonidine 0.1mg at home). Now s/p 10mg of Hydralazine and 50mcg of Fentanyl in ED -> most recent /87. Labile BP may be in the setting of agitation 2/2 EtOH withdrawal vs pain in the setting of possible L forearm fracture.   Plan:   - C/w home Clonidine vs Precedex     # Prolonged QTC  Pt had QTC of 506 in ED. Not on any QTC prolonging drugs.   Plan:   - F/u with serum Mg level   - Repleting NaPhos   - Replete electrolytes PRN     Endocrine:  BASHIR   Plan:   - F/u with TSH add on     MSK:   #Left proximal forearm fx?   Pt s/p XRAY of forearm, pending official read. Resident impression, possible fx at proximal ulna.   Plan:   - F/u with Ortho consult    GI:  NPO for now     :   BASHIR     Renal:   BASHIR     ID:   Pt afebrile at this time, no cough or infectious sx. No leukocytosis.   Plan:   - If pt desats further, obtain CXR  - Caution for aspiration     Heme/Onc:  #Macrocytic anemia   Plan:   - Presumed d/2 B12/Folate deficiency   - Recommending high dose B12 and folate supplementation at this time   - H&H lateral from previous admissions dating back to 7/2022     Misc: F: NPO   E: replete PRN Mg <2, K > 4  DVT: lovenox   N: NPO  GI: none  Code Status: full code  Dispo: ICU    Ms. Ko is a 57 y.o F with a PMHx of HTN, alcohol abuse, seizures p/w LOC/pain in the setting of fall at home due to presumed EtOH withdrawal vs epileptic seizure     Neuro:   #AMS #EtOH withdrawal seizure   Pt with AMS with previous ED visits and admissions in the setting of EtOH withdrawal. Pt with neg EtOH in [facility], was reportedly found this AM with bottle of liquor per  who was not at bedside. Pt s/p 3mg IV ativan, Keppra 500mg IVP @ 0600, and Morphine 4mg @ 0600. Sedation may be in the setting of EtOH withdrawal, post-ictal state, polypharmacy. Per HIE, pt last saw her epilepsy physician Dr. 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 Per documentation, pt was trialed on Topiramate 25mg at bedtime for several weeks to attempt to cut alcohol craving at that time.   Plan:  - Recommending IV Keppra at this time, c/w Keppra 500mg Q12h. Can transition to PO once pt tolerating.   - Ativan 2mg Q1h PRN for CIWA >8   - CIWA protocol Q4h, consider librium taper   - Add on TSH, f/u with Keppra level add on   - Begin vEEG, if any abnormalities, consider Neurology consult for potential epileptiform seizures   - C/w high dose Thiamine & Folic acid   - Epilepsy consulted, appreciate recs     Pulm:   # Shortness of breath  Pt never hypoxic in ED, most recent O2 (off nasal cannula) 92%.   Plan:   - Given degree of sedation, recommending continuing on 2L NC for now. Wean as tolerated  - If desatting < 92% or if not protecting airway, recommending intubation     Cardiovascular:  # Severe asymptomatic hypertension   Pt intermittently hypertensive to 200/100 in ED. Pt with hx of HTN at home (on Clonidine 0.1mg at home). Now s/p 10mg of Hydralazine and 50mcg of Fentanyl in ED -> most recent /87. Labile BP may be in the setting of agitation 2/2 EtOH withdrawal vs pain in the setting of possible L forearm fracture.   Plan:   - C/w home Clonidine vs Precedex     # Prolonged QTC  Pt had QTC of 506 in ED. Not on any QTC prolonging drugs.   Plan:   - F/u with serum Mg level   - Repleting NaPhos   - Replete electrolytes PRN     Endocrine:  BASHIR   Plan:   - F/u with TSH add on     MSK:   #Left proximal forearm fx?   Pt s/p XRAY of forearm, pending official read. Resident impression, possible fx at proximal ulna.   Plan:   - F/u with Ortho consult    GI:  NPO for now     :   BASHIR     Renal:   BASHIR     ID:   Pt afebrile at this time, no cough or infectious sx. No leukocytosis.   Plan:   - If pt desats further, obtain CXR  - Caution for aspiration     Heme/Onc:  #Macrocytic anemia   Plan:   - Presumed d/2 B12/Folate deficiency   - Recommending high dose B12 and folate supplementation at this time   - H&H lateral from previous admissions dating back to 7/2022     Misc: F: NPO   E: replete PRN Mg <2, K > 4  DVT: lovenox   N: NPO  GI: none  Code Status: full code  Dispo: ICU

## 2023-09-05 NOTE — CONSULT NOTE ADULT - SUBJECTIVE AND OBJECTIVE BOX
Ms. Ko is a 57 y.o F with a PMHx of HTN, alcohol abuse, seizures p/w LOC/pain. History obtained primarily through ED documentation as well as through pt albeit somnolent. Per documentation, pt allegedly had fall at home this AM, was at home with her  when she fell from her computer. Pt was reportedly found on the floor with gurgling noises and shaking per her . Pt was unable to elucidate EtOH or illicit drug usage, however did admit to EtOH a few hours prior to presentation to ED providers. Pt also reported to be compliant with medications including antiepileptics. Of note, pt was here previously in ED on  for “detox”, and recently on  for fall 2/ EtOH withdrawal. Pt also had admission in 2022 with Epilepsy, ICU consulted at that time for presumed withdrawal seizures. Pt was d/c at that time to Epilepsy clinic with last neuro visit w/ dr. Cantrell in . Was placed on Keppra 500mg BID, and started On Topiramate 25mg qhs.     At bedside, pt somnolent s/p 2mg IV ativan and 4mg morphine after witnessed generalized tonic clonic seizure in ED, reportedly ~ 30 seconds. Pt unable to answer questions about drug/EtOH use, medication compliance, or ROS apart from pain in her L forearm.     ED Course:  ED Vitals: Initial T: 98.1, HR 70, /92, satting 94% on RA   Pertinent labs: Hgb/Hct 11.4/35 (.6), Na 136, K 4.2, Cl 96, CO2 26, BUN/Cr 9/0.37 respectively, Glucose 144   ABG: pH 7.41, CO2 29.2, PCO2 45, HCO3 28   CT Head: No acute intracranial hemorrhage or calvarial fracture. Moderate frontal scalp hematoma.  Left elbow XRAY: (resident read) potential nondisplaced fx of proximal L ulna   EKG: Normal sinus rhythm with normal axis, no ischemic changes, and QTC of 506   UTox: WNL     Medications given: Keppra 500mg IVP @ 0600, Ativan 1mg IVPx3 @ 0600, 0700, 0800. Morphine 4mg IVP @ 0600, Thiamine 100mg IVP @ 0700, 1L NS @ 0700, Fentanyl 50mg IVP @ 0800, Hydralazine 10mg IVP @ 0800, NaPhos 30mmol.     ICU consulted for witnessed clonictonic seizure in ED likely in the setting of EtOH withdrawal vs epileptic seizure    ICU Vital Signs Last 24 Hrs  T(C): 36.7 (05 Sep 2023 06:05), Max: 36.7 (05 Sep 2023 06:05)  T(F): 98.1 (05 Sep 2023 06:05), Max: 98.1 (05 Sep 2023 06:05)  HR: 83 (05 Sep 2023 08:57) (70 - 100)  BP: 160/89 (05 Sep 2023 08:57) (158/92 - 197/79)  BP(mean): --  ABP: --  ABP(mean): --  RR: 17 (05 Sep 2023 08:57) (16 - 23)  SpO2: 99% (05 Sep 2023 08:57) (90% - 100%)    O2 Parameters below as of 05 Sep 2023 08:57  Patient On (Oxygen Delivery Method): nasal cannula  O2 Flow (L/min): 2        I&O's Summary        LABS:                        11.4   7.12  )-----------( 331      ( 05 Sep 2023 06:46 )             35.0     09-05    136  |  96  |  6<L>  ----------------------------<  144<H>  4.2   |  26  |  0.41<L>    Ca    9.6      05 Sep 2023 06:46  Phos  2.5     09-05  Mg     1.6     09-05    TPro  7.3  /  Alb  4.3  /  TBili  0.4  /  DBili  x   /  AST  33  /  ALT  17  /  AlkPhos  73  09-05      Urinalysis Basic - ( 05 Sep 2023 06:46 )    Color: Yellow / Appearance: Clear / S.015 / pH: x  Gluc: 144 mg/dL / Ketone: 15 mg/dL  / Bili: Negative / Urobili: 0.2 E.U./dL   Blood: x / Protein: NEGATIVE mg/dL / Nitrite: NEGATIVE   Leuk Esterase: NEGATIVE / RBC: x / WBC x   Sq Epi: x / Non Sq Epi: x / Bacteria: x      CAPILLARY BLOOD GLUCOSE      POCT Blood Glucose.: 141 mg/dL (05 Sep 2023 06:43)        RADIOLOGY & ADDITIONAL TESTS:    Consultant(s) Notes Reviewed:  [x ] YES  [ ] NO    MEDICATIONS  (STANDING):    MEDICATIONS  (PRN):      PHYSICAL EXAM:  GENERAL: L Scalp hematoma, minimally responsive to questions. Protecting airway and verbalizing pain in L arm.   HEAD:  + L scalp hematoma with small, ~ .25cm area of skin abrasion on L forehead  EYES: EOMI, Pupils ~ 0.5mm and pinpoint   NECK: Supple, No JVD, Normal thyroid, no enlarged nodes  NERVOUS SYSTEM: No clonus in b/l lower extremities, observed moving all extremities against gravity. No tremors at rest.   CHEST/LUNG: B/L good air entry; No rales, rhonchi, or wheezing  HEART: S1S2 normal, no S3, Regular rhythm and + Tachycardic   ABDOMEN: Soft, Nontender, Nondistended; Bowel sounds present  EXTREMITIES:  2+ Peripheral Pulses, No clubbing, cyanosis, or edema  LYMPH: No lymphadenopathy noted  SKIN: No rashes or lesions    Care Discussed with Consultants/Other Providers [ x] YES  [ ] NO Ms. Ko is a 57 y.o F with a PMHx of HTN, alcohol abuse, seizures p/w LOC/pain. History obtained primarily through ED documentation as well as through pt albeit somnolent. Per documentation, pt allegedly had fall at home this AM, was at home with her  when she fell from her computer. Pt was reportedly found on the floor with gurgling noises and shaking per her . Pt was unable to elucidate EtOH or illicit drug usage, however did admit to EtOH a few hours prior to presentation to ED providers. Pt also reported to be compliant with medications including antiepileptics. Of note, pt was here previously in ED on  for “detox”, and recently on  for fall 2/ EtOH withdrawal. Pt also had admission in 2022 with Epilepsy with ICU consult at that time for presumed withdrawal seizures. Pt was d/c at that visit in 2022 to Epilepsy clinic with last neuro visit w/ dr. Cantrell in  of this year. Was placed on Keppra 500mg BID, and started On Topiramate 25mg qhs.     At bedside, pt somnolent s/p 2mg IV ativan and 4mg morphine after witnessed generalized tonic clonic seizure in ED, reportedly ~ 30 seconds. Pt unable to answer questions about drug/EtOH use, medication compliance, or ROS apart from pain in her L forearm.     ED Course:  ED Vitals: Initial T: 98.1, HR 70, /92, satting 94% on RA   Pertinent labs: Hgb/Hct 11.4/35 (.6), Na 136, K 4.2, Cl 96, CO2 26, BUN/Cr 9/0.37 respectively, Glucose 144   ABG: pH 7.41, CO2 29.2, PCO2 45, HCO3 28   CT Head: No acute intracranial hemorrhage or calvarial fracture. Moderate frontal scalp hematoma.  Left elbow XRAY: (resident read) potential nondisplaced fx of proximal L ulna   EKG: Normal sinus rhythm with normal axis, no ischemic changes, and QTC of 506   UTox: WNL     Medications given: Keppra 500mg IVP @ 0600, Ativan 1mg IVPx3 @ 0600, 0700, 0800. Morphine 4mg IVP @ 0600, Thiamine 100mg IVP @ 0700, 1L NS @ 0700, Fentanyl 50mg IVP @ 0800, Hydralazine 10mg IVP @ 0800, NaPhos 30mmol.     ICU consulted for witnessed clonictonic seizure in ED likely in the setting of EtOH withdrawal vs epileptic seizure    ICU Vital Signs Last 24 Hrs  T(C): 36.7 (05 Sep 2023 06:05), Max: 36.7 (05 Sep 2023 06:05)  T(F): 98.1 (05 Sep 2023 06:05), Max: 98.1 (05 Sep 2023 06:05)  HR: 83 (05 Sep 2023 08:57) (70 - 100)  BP: 160/89 (05 Sep 2023 08:57) (158/92 - 197/79)  BP(mean): --  ABP: --  ABP(mean): --  RR: 17 (05 Sep 2023 08:57) (16 - 23)  SpO2: 99% (05 Sep 2023 08:57) (90% - 100%)    O2 Parameters below as of 05 Sep 2023 08:57  Patient On (Oxygen Delivery Method): nasal cannula  O2 Flow (L/min): 2        I&O's Summary        LABS:                        11.4   7.12  )-----------( 331      ( 05 Sep 2023 06:46 )             35.0     09-05    136  |  96  |  6<L>  ----------------------------<  144<H>  4.2   |  26  |  0.41<L>    Ca    9.6      05 Sep 2023 06:46  Phos  2.5     09-05  Mg     1.6     09-05    TPro  7.3  /  Alb  4.3  /  TBili  0.4  /  DBili  x   /  AST  33  /  ALT  17  /  AlkPhos  73  09-05      Urinalysis Basic - ( 05 Sep 2023 06:46 )    Color: Yellow / Appearance: Clear / S.015 / pH: x  Gluc: 144 mg/dL / Ketone: 15 mg/dL  / Bili: Negative / Urobili: 0.2 E.U./dL   Blood: x / Protein: NEGATIVE mg/dL / Nitrite: NEGATIVE   Leuk Esterase: NEGATIVE / RBC: x / WBC x   Sq Epi: x / Non Sq Epi: x / Bacteria: x      CAPILLARY BLOOD GLUCOSE      POCT Blood Glucose.: 141 mg/dL (05 Sep 2023 06:43)        RADIOLOGY & ADDITIONAL TESTS:    Consultant(s) Notes Reviewed:  [x ] YES  [ ] NO    MEDICATIONS  (STANDING):    MEDICATIONS  (PRN):      PHYSICAL EXAM:  GENERAL: L Scalp hematoma, minimally responsive to questions. Protecting airway and verbalizing pain in L arm.   HEAD:  + L scalp hematoma with small, ~ .25cm area of skin abrasion on L forehead  EYES: EOMI, Pupils ~ 0.5mm and pinpoint   NECK: Supple, No JVD, Normal thyroid, no enlarged nodes  NERVOUS SYSTEM: No clonus in b/l lower extremities, observed moving all extremities against gravity. No tremors at rest.   CHEST/LUNG: B/L good air entry; No rales, rhonchi, or wheezing  HEART: S1S2 normal, no S3, Regular rhythm and + Tachycardic   ABDOMEN: Soft, Nontender, Nondistended; Bowel sounds present  EXTREMITIES:  2+ Peripheral Pulses, No clubbing, cyanosis, or edema  LYMPH: No lymphadenopathy noted  SKIN: No rashes or lesions    Care Discussed with Consultants/Other Providers [ x] YES  [ ] NO Ms. Ko is a 57 y.o F with a PMHx of HTN, alcohol abuse, seizures p/w LOC/pain. History obtained primarily through ED documentation as well as through pt albeit somnolent. Per documentation, pt allegedly had fall at home this AM, was at home with her  when she fell from her computer. Pt was reportedly found on the floor with gurgling noises and shaking per her . Pt was unable to elucidate EtOH or illicit drug usage, however did admit to EtOH a few hours prior to presentation to ED providers. Pt also reported to be compliant with medications including antiepileptics. Of note, pt was here previously in ED on  for “detox”, and recently on  for fall 2/ EtOH withdrawal. Pt also had admission in 2022 with Epilepsy with ICU consult at that time for presumed withdrawal seizures. Pt was d/c at that visit in 2022 to Epilepsy clinic with last neuro visit w/ dr. Cantrell in  of this year. Was placed on Keppra 500mg BID, and started on Topiramate 25mg qhs at bedtime for several weeks to attempt to reduce alcohol cravings. Per Dr. Cantrell's note on HIE, pt's recurrent seizures attributed due to ETOH withdrawal.      At bedside, pt somnolent s/p 2mg IV ativan and 4mg morphine after witnessed generalized tonic clonic seizure in ED, reportedly ~ 30 seconds. Pt unable to answer questions about drug/EtOH use, medication compliance, or ROS apart from pain in her L forearm.     ED Course:  ED Vitals: Initial T: 98.1, HR 70, /92, satting 94% on RA   Pertinent labs: Hgb/Hct 11.4/35 (.6), Na 136, K 4.2, Cl 96, CO2 26, BUN/Cr 9/0.37 respectively, Glucose 144   ABG: pH 7.41, CO2 29.2, PCO2 45, HCO3 28   CT Head: No acute intracranial hemorrhage or calvarial fracture. Moderate frontal scalp hematoma.  Left elbow XRAY: (resident read) potential nondisplaced fx of proximal L ulna   EKG: Normal sinus rhythm with normal axis, no ischemic changes, and QTC of 506   UTox: WNL     Medications given: Keppra 500mg IVP @ 0600, Ativan 1mg IVPx3 @ 0600, 0700, 0800. Morphine 4mg IVP @ 0600, Thiamine 100mg IVP @ 0700, 1L NS @ 0700, Fentanyl 50mg IVP @ 0800, Hydralazine 10mg IVP @ 0800, NaPhos 30mmol.     ICU consulted for witnessed clonictonic seizure in ED likely in the setting of EtOH withdrawal vs epileptic seizure    ICU Vital Signs Last 24 Hrs  T(C): 36.7 (05 Sep 2023 06:05), Max: 36.7 (05 Sep 2023 06:05)  T(F): 98.1 (05 Sep 2023 06:05), Max: 98.1 (05 Sep 2023 06:05)  HR: 83 (05 Sep 2023 08:57) (70 - 100)  BP: 160/89 (05 Sep 2023 08:57) (158/92 - 197/79)  BP(mean): --  ABP: --  ABP(mean): --  RR: 17 (05 Sep 2023 08:57) (16 - 23)  SpO2: 99% (05 Sep 2023 08:57) (90% - 100%)    O2 Parameters below as of 05 Sep 2023 08:57  Patient On (Oxygen Delivery Method): nasal cannula  O2 Flow (L/min): 2        I&O's Summary        LABS:                        11.4   7.12  )-----------( 331      ( 05 Sep 2023 06:46 )             35.0     09-05    136  |  96  |  6<L>  ----------------------------<  144<H>  4.2   |  26  |  0.41<L>    Ca    9.6      05 Sep 2023 06:46  Phos  2.5     09-05  Mg     1.6     09-05    TPro  7.3  /  Alb  4.3  /  TBili  0.4  /  DBili  x   /  AST  33  /  ALT  17  /  AlkPhos  73  09-05      Urinalysis Basic - ( 05 Sep 2023 06:46 )    Color: Yellow / Appearance: Clear / S.015 / pH: x  Gluc: 144 mg/dL / Ketone: 15 mg/dL  / Bili: Negative / Urobili: 0.2 E.U./dL   Blood: x / Protein: NEGATIVE mg/dL / Nitrite: NEGATIVE   Leuk Esterase: NEGATIVE / RBC: x / WBC x   Sq Epi: x / Non Sq Epi: x / Bacteria: x      CAPILLARY BLOOD GLUCOSE      POCT Blood Glucose.: 141 mg/dL (05 Sep 2023 06:43)        RADIOLOGY & ADDITIONAL TESTS:    Consultant(s) Notes Reviewed:  [x ] YES  [ ] NO    MEDICATIONS  (STANDING):    MEDICATIONS  (PRN):      PHYSICAL EXAM:  GENERAL: L Scalp hematoma, minimally responsive to questions. Protecting airway and verbalizing pain in L arm.   HEAD:  + L scalp hematoma with small, ~ .25cm area of skin abrasion on L forehead  EYES: EOMI, Pupils ~ 0.5mm and pinpoint   NECK: Supple, No JVD, Normal thyroid, no enlarged nodes  NERVOUS SYSTEM: No clonus in b/l lower extremities, observed moving all extremities against gravity. No tremors at rest.   CHEST/LUNG: B/L good air entry; No rales, rhonchi, or wheezing  HEART: S1S2 normal, no S3, Regular rhythm and + Tachycardic   ABDOMEN: Soft, Nontender, Nondistended; Bowel sounds present  EXTREMITIES:  2+ Peripheral Pulses, No clubbing, cyanosis, or edema  LYMPH: No lymphadenopathy noted  SKIN: No rashes or lesions    Care Discussed with Consultants/Other Providers [ x] YES  [ ] NO

## 2023-09-05 NOTE — ED ADULT NURSE REASSESSMENT NOTE - NS ED NURSE REASSESS COMMENT FT1
Patient ambulated to bathroom with steady gait. Upon returning to [facility], patient began to seize- lasting approximately 30 seconds. Dr. Bates to bedside. Patient on cardiac monitor, post-ictal, NAD.

## 2023-09-05 NOTE — CONSULT NOTE ADULT - ATTENDING COMMENTS
57-year-old woman with alcohol abuse and history of multiple prior seizures in the setting of alcohol withdrawal, admitted following a seizure.  On exam quite somnolent, likely due to sedating medications, but following simple commands and moving all 4 extremities with good strength.  EEG thus far is normal.  Prior EEGs have been unremarkable as well.  I am not certain that she has true underlying epileptic potential as all of her seizures seem to have been in the setting of intoxication or withdrawal, and we have not captured epileptiform activity on EEG.  Reasonable to continue Keppra 500 mg BID just in case.  Continue EEG for now, will plan to dc tomorrow if no epileptiform activity recorded.  Management of alcohol withdrawal per primary team.

## 2023-09-06 DIAGNOSIS — Z29.9 ENCOUNTER FOR PROPHYLACTIC MEASURES, UNSPECIFIED: ICD-10-CM

## 2023-09-06 DIAGNOSIS — G40.409 OTHER GENERALIZED EPILEPSY AND EPILEPTIC SYNDROMES, NOT INTRACTABLE, WITHOUT STATUS EPILEPTICUS: ICD-10-CM

## 2023-09-06 DIAGNOSIS — D53.9 NUTRITIONAL ANEMIA, UNSPECIFIED: ICD-10-CM

## 2023-09-06 DIAGNOSIS — I10 ESSENTIAL (PRIMARY) HYPERTENSION: ICD-10-CM

## 2023-09-06 DIAGNOSIS — F10.239 ALCOHOL DEPENDENCE WITH WITHDRAWAL, UNSPECIFIED: ICD-10-CM

## 2023-09-06 DIAGNOSIS — S52.022A DISPLACED FRACTURE OF OLECRANON PROCESS WITHOUT INTRAARTICULAR EXTENSION OF LEFT ULNA, INITIAL ENCOUNTER FOR CLOSED FRACTURE: ICD-10-CM

## 2023-09-06 LAB
ANION GAP SERPL CALC-SCNC: 11 MMOL/L — SIGNIFICANT CHANGE UP (ref 5–17)
BUN SERPL-MCNC: 6 MG/DL — LOW (ref 7–23)
CALCIUM SERPL-MCNC: 8.8 MG/DL — SIGNIFICANT CHANGE UP (ref 8.4–10.5)
CHLORIDE SERPL-SCNC: 103 MMOL/L — SIGNIFICANT CHANGE UP (ref 96–108)
CO2 SERPL-SCNC: 19 MMOL/L — LOW (ref 22–31)
CREAT SERPL-MCNC: 0.5 MG/DL — SIGNIFICANT CHANGE UP (ref 0.5–1.3)
CULTURE RESULTS: SIGNIFICANT CHANGE UP
EGFR: 109 ML/MIN/1.73M2 — SIGNIFICANT CHANGE UP
GLUCOSE SERPL-MCNC: 111 MG/DL — HIGH (ref 70–99)
HCT VFR BLD CALC: 30.9 % — LOW (ref 34.5–45)
HGB BLD-MCNC: 9.9 G/DL — LOW (ref 11.5–15.5)
MCHC RBC-ENTMCNC: 31.5 PG — SIGNIFICANT CHANGE UP (ref 27–34)
MCHC RBC-ENTMCNC: 32 GM/DL — SIGNIFICANT CHANGE UP (ref 32–36)
MCV RBC AUTO: 98.4 FL — SIGNIFICANT CHANGE UP (ref 80–100)
NRBC # BLD: 0 /100 WBCS — SIGNIFICANT CHANGE UP (ref 0–0)
PLATELET # BLD AUTO: 188 K/UL — SIGNIFICANT CHANGE UP (ref 150–400)
POTASSIUM SERPL-MCNC: 3.5 MMOL/L — SIGNIFICANT CHANGE UP (ref 3.5–5.3)
POTASSIUM SERPL-SCNC: 3.5 MMOL/L — SIGNIFICANT CHANGE UP (ref 3.5–5.3)
RBC # BLD: 3.14 M/UL — LOW (ref 3.8–5.2)
RBC # FLD: 16.1 % — HIGH (ref 10.3–14.5)
SODIUM SERPL-SCNC: 133 MMOL/L — LOW (ref 135–145)
SPECIMEN SOURCE: SIGNIFICANT CHANGE UP
WBC # BLD: 4.8 K/UL — SIGNIFICANT CHANGE UP (ref 3.8–10.5)
WBC # FLD AUTO: 4.8 K/UL — SIGNIFICANT CHANGE UP (ref 3.8–10.5)

## 2023-09-06 PROCEDURE — 95720 EEG PHY/QHP EA INCR W/VEEG: CPT

## 2023-09-06 PROCEDURE — 99231 SBSQ HOSP IP/OBS SF/LOW 25: CPT

## 2023-09-06 PROCEDURE — 99233 SBSQ HOSP IP/OBS HIGH 50: CPT | Mod: GC

## 2023-09-06 RX ORDER — POTASSIUM CHLORIDE 20 MEQ
40 PACKET (EA) ORAL ONCE
Refills: 0 | Status: COMPLETED | OUTPATIENT
Start: 2023-09-06 | End: 2023-09-06

## 2023-09-06 RX ORDER — POTASSIUM CHLORIDE 20 MEQ
10 PACKET (EA) ORAL ONCE
Refills: 0 | Status: DISCONTINUED | OUTPATIENT
Start: 2023-09-06 | End: 2023-09-06

## 2023-09-06 RX ADMIN — Medication 40 MILLIEQUIVALENT(S): at 08:51

## 2023-09-06 RX ADMIN — CHLORHEXIDINE GLUCONATE 1 APPLICATION(S): 213 SOLUTION TOPICAL at 06:31

## 2023-09-06 RX ADMIN — LEVETIRACETAM 400 MILLIGRAM(S): 250 TABLET, FILM COATED ORAL at 06:31

## 2023-09-06 NOTE — PROGRESS NOTE ADULT - PROBLEM SELECTOR PLAN 6
Misc: F: NPO   E: replete PRN Mg <2, K > 4  DVT: lovenox   N: NPO  GI: none  Code Status: full code  Dispo: ICU

## 2023-09-06 NOTE — CHART NOTE - NSCHARTNOTEFT_GEN_A_CORE
The patient did not want surgery (Left olecranon ORIF with Dr. Gill) and did not want to sign the consent. I explained to her benefits and risks of operation vs nonop. She understands them and want second opinion and need some time to think through.

## 2023-09-06 NOTE — PROGRESS NOTE ADULT - ASSESSMENT
Ms Ko is a 57 year old female with H/O EtOH withdrawal seizure admitted for witnessed seizure and fall. Her exam is limited due to recently received Ativan and Morphine. EEG currently showing no seizure. She had multiple ED visit for either seizure/ fall related to EtOH intoxication. Currently it seems to be another alcohol intoxication/withdrawal seizure in setting of poor compliance.     Recommendations:  - Continue Keppra 500 mg BID for now  - Will f/u EEG  - [facility] protocol.  - Seizure precaution and delirium precaution.   Ms Ko is a 57 year old female with H/O EtOH withdrawal seizure admitted for witnessed seizure and fall. Her exam is limited due to recently received Ativan and Morphine. EEG currently showing no seizure. She had multiple ED visit for either seizure/ fall related to EtOH intoxication. Currently it seems to be another alcohol intoxication/withdrawal seizure in setting of poor compliance. Her EEG didn't show any epileptic activity.     Recommendations:  - Continue Keppra 500 mg BID  - Discontinue Video EEG  - CIWA protocol.  - Seizure precaution and delirium precaution.  - Epilepsy will sign off. Please call back with any question or concern, Patient should follow up with Dr. Cantrell following discharge

## 2023-09-06 NOTE — PROGRESS NOTE ADULT - PROBLEM SELECTOR PLAN 1
Pt with AMS with previous ED visits and admissions in the setting of EtOH withdrawal. Pt with neg EtOH in UTox, was reportedly found this AM with bottle of liquor per  who was not at bedside. Pt s/p 3mg IV ativan, Keppra 500mg IVP @ 0600, and Morphine 4mg @ 0600. Sedation may be in the setting of EtOH withdrawal, post-ictal state, polypharmacy. Per HIE, pt last saw her epilepsy physician Dr. 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 Per documentation, pt was trialed on Topiramate 25mg at bedtime for several weeks to attempt to cut alcohol craving at that time.   Plan:  - c/w Keppra 500mg Q12h  - Ativan 2mg Q1h PRN for CIWA >8   - CIWA protocol Q4h, consider librium taper   - vEEG without abnormalities, consider Neurology consult for potential epileptiform seizures   - C/w high dose Thiamine & Folic acid   - Epilepsy consulted, appreciate recs

## 2023-09-06 NOTE — PROGRESS NOTE ADULT - PROBLEM SELECTOR PLAN 4
Ortho following for L olecrenon process.  - Ortho will do ORIF tonight  - NPO for 8 hrs prior to surgery

## 2023-09-06 NOTE — PROGRESS NOTE ADULT - ASSESSMENT
Ms. Ko is a 57 y.o F with a PMHx of HTN, alcohol abuse, seizures p/w LOC/pain in the setting of fall at home due to presumed EtOH withdrawal vs epileptic seizure     Neuro:   #AMS #EtOH withdrawal seizure   Pt with AMS with previous ED visits and admissions in the setting of EtOH withdrawal. Pt with neg EtOH in UTox, was reportedly found this AM with bottle of liquor per  who was not at bedside. Pt s/p 3mg IV ativan, Keppra 500mg IVP @ 0600, and Morphine 4mg @ 0600. Sedation may be in the setting of EtOH withdrawal, post-ictal state, polypharmacy. Per HIE, pt last saw her epilepsy physician Dr. 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 Per documentation, pt was trialed on Topiramate 25mg at bedtime for several weeks to attempt to cut alcohol craving at that time.   Plan:  - c/w Keppra 500mg Q12h  - Ativan 2mg Q1h PRN for CIWA >8   - CIWA protocol Q4h, consider librium taper   - vEEG without abnormalities, consider Neurology consult for potential epileptiform seizures   - C/w high dose Thiamine & Folic acid   - Epilepsy consulted, appreciate recs     Pulm:   # Shortness of breath  Pt never hypoxic in ED, most recent O2 (off nasal cannula) 92%.   Plan:   - Currently satting 99% on RA  - If desatting < 92% or if not protecting airway, recommending intubation     Cardiovascular:  # Severe asymptomatic hypertension   Pt intermittently hypertensive to 200/100 in ED. Pt with hx of HTN at home (on Clonidine 0.1mg at home). Now s/p 10mg of Hydralazine and 50mcg of Fentanyl in ED -> most recent /87. Labile BP may be in the setting of agitation 2/2 EtOH withdrawal vs pain in the setting of possible L forearm fracture. /79 without antihypertensive meds (9/6)   Plan:   - Started home Clonidine vs Precedex but d/kiara       # Prolonged QTC  Pt had QTC of 506 in ED. Not on any QTC prolonging drugs.   Plan:   - F/u with serum Mg level   - Repleting NaPhos   - Replete electrolytes PRN     Endocrine:  BASHIR     MSK:   #Left olecranon fracture  Plan:   - Corrective operation per ortho    GI:  NPO for now     :   BASHIR     Renal:   BASHIR     ID:   Pt afebrile at this time, no cough or infectious sx. No leukocytosis.   Plan:   - If pt desats further, obtain CXR       Heme/Onc:  #Macrocytic anemia   Plan:   - Presumed d/2 B12/Folate deficiency   - H&H lateral from previous admissions dating back to 7/2022     Misc: F: NPO   E: replete PRN Mg <2, K > 4  DVT: lovenox   N: NPO  GI: none  Code Status: full code  Dispo: ICU Ms. Ko is a 57 y.o F with a PMHx of HTN, alcohol abuse, seizures p/w LOC/pain in the setting of fall at home due to presumed EtOH withdrawal

## 2023-09-06 NOTE — PROGRESS NOTE ADULT - ATTENDING COMMENTS
57 y.o F with a PMHx of HTN, alcohol abuse, seizures p/w LOC/pain in the setting of fall at home due to presumed EtOH withdrawal vs epileptic seizure     Labs and imaging reviewed    Problem List  #EtOH Withdrawal - CIWA minimal, continue to monitor with Ativan protocol   #Seizures - continue management Keppra  #Olecranon Fracture - Plan for OR  #Pre-Op Eval - RCRI 0, Class I 3.9% CV Risk, Low to intermediate procedure, patient is low risk with METS>4 at baseline, given EtOH withdrawal will need increased benzos during procedure  #HTN - continue to monitor     Rest as above
On exam today awake, alert, following all commands, moving all 4 extremities symmetrically with good strength.  I am still uncertain whether she has had true unprovoked seizures, but recommend continuing Keppra at least until follow up with Dr. Cantrell.  We discussed that the most important factor in preventing future seizures is discontinuation of alcohol use.  Epilepsy will sign off.  Please call with any questions.

## 2023-09-06 NOTE — PROGRESS NOTE ADULT - PROBLEM SELECTOR PLAN 3
Pt intermittently hypertensive to 200/100 in ED. Pt with hx of HTN at home (on Clonidine 0.1mg at home). Now s/p 10mg of Hydralazine and 50mcg of Fentanyl in ED -> most recent /87. Labile BP may be in the setting of agitation 2/2 EtOH withdrawal vs pain in the setting of possible L forearm fracture. /79 without antihypertensive meds (9/6) s/p home Clonidine vs Precedex but d/kiara  - CTM, HDS for now

## 2023-09-06 NOTE — PROGRESS NOTE ADULT - ASSESSMENT
Ms. Ko is a 57 y.o F with a PMHx of HTN, alcohol abuse, seizures p/w LOC/pain in the setting of fall at home due to presumed EtOH withdrawal vs epileptic seizure     Neuro:   #AMS #EtOH withdrawal seizure   Pt with AMS with previous ED visits and admissions in the setting of EtOH withdrawal. Pt with neg EtOH in UTox, was reportedly found this AM with bottle of liquor per  who was not at bedside. Pt s/p 3mg IV ativan, Keppra 500mg IVP @ 0600, and Morphine 4mg @ 0600. Sedation may be in the setting of EtOH withdrawal, post-ictal state, polypharmacy. Per HIE, pt last saw her epilepsy physician Dr. 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 Per documentation, pt was trialed on Topiramate 25mg at bedtime for several weeks to attempt to cut alcohol craving at that time.   Plan:  - c/w Keppra 500mg Q12h, dc pending epilepsy  - Ativan 2mg Q1h PRN for CIWA >8   - CIWA protocol Q4h, consider librium taper   - vEEG without abnormalities, consider Neurology consult for potential epileptiform seizures   - C/w high dose Thiamine & Folic acid   - Epilepsy consulted, appreciate recs     Pulm:   # Shortness of breath  Pt never hypoxic in ED, most recent O2 (off nasal cannula) 92%.   Plan:   - Currently satting 99% on RA  - If desatting < 92% or if not protecting airway, recommending intubation     Cardiovascular:  # Severe asymptomatic hypertension   Pt intermittently hypertensive to 200/100 in ED. Pt with hx of HTN at home (on Clonidine 0.1mg at home). Now s/p 10mg of Hydralazine and 50mcg of Fentanyl in ED -> most recent /87. Labile BP may be in the setting of agitation 2/2 EtOH withdrawal vs pain in the setting of possible L forearm fracture. /79 without antihypertensive meds (9/6)   Plan:   - Started home Clonidine vs Precedex but d/kiara       # Prolonged QTC  Pt had QTC of 506 in ED. Not on any QTC prolonging drugs.   Plan:   - F/u with serum Mg level   - Repleting NaPhos   - Replete electrolytes PRN     Endocrine:  BASHIR     MSK:   #Left olecranon fracture  Plan:   - Corrective operation per ortho    GI:  NPO for now     :   BASHIR     Renal:   BASHIR     ID:   Pt afebrile at this time, no cough or infectious sx. No leukocytosis.   Plan:   - If pt desats further, obtain CXR       Heme/Onc:  #Macrocytic anemia   Plan:   - Presumed d/2 B12/Folate deficiency   - H&H lateral from previous admissions dating back to 7/2022     Misc: F: NPO   E: replete PRN Mg <2, K > 4  DVT: lovenox   N: NPO  GI: none  Code Status: full code  Dispo: ICU

## 2023-09-06 NOTE — PROGRESS NOTE ADULT - PROBLEM SELECTOR PLAN 5
Presumed d/2 B12/Folate deficiency   - H&H lateral from previous admissions dating back to 7/2022  - CTM

## 2023-09-06 NOTE — PROGRESS NOTE ADULT - PROBLEM SELECTOR PLAN 2
Pt with known alcohol dependence. Last drink yesterday. Unknown CIWA score on admission due to AMS but on the RMF, CIWA now 0.  - plan as above

## 2023-09-07 ENCOUNTER — TRANSCRIPTION ENCOUNTER (OUTPATIENT)
Age: 58
End: 2023-09-07

## 2023-09-07 VITALS
TEMPERATURE: 98 F | SYSTOLIC BLOOD PRESSURE: 147 MMHG | DIASTOLIC BLOOD PRESSURE: 87 MMHG | HEART RATE: 79 BPM | OXYGEN SATURATION: 97 % | RESPIRATION RATE: 18 BRPM

## 2023-09-07 PROCEDURE — 82553 CREATINE MB FRACTION: CPT

## 2023-09-07 PROCEDURE — 87086 URINE CULTURE/COLONY COUNT: CPT

## 2023-09-07 PROCEDURE — 99285 EMERGENCY DEPT VISIT HI MDM: CPT | Mod: 25

## 2023-09-07 PROCEDURE — 96374 THER/PROPH/DIAG INJ IV PUSH: CPT

## 2023-09-07 PROCEDURE — 85027 COMPLETE CBC AUTOMATED: CPT

## 2023-09-07 PROCEDURE — 81003 URINALYSIS AUTO W/O SCOPE: CPT

## 2023-09-07 PROCEDURE — 85025 COMPLETE CBC W/AUTO DIFF WBC: CPT

## 2023-09-07 PROCEDURE — 82803 BLOOD GASES ANY COMBINATION: CPT

## 2023-09-07 PROCEDURE — 84295 ASSAY OF SERUM SODIUM: CPT

## 2023-09-07 PROCEDURE — 84100 ASSAY OF PHOSPHORUS: CPT

## 2023-09-07 PROCEDURE — 95708 EEG WO VID EA 12-26HR UNMNTR: CPT

## 2023-09-07 PROCEDURE — 70450 CT HEAD/BRAIN W/O DYE: CPT | Mod: MA

## 2023-09-07 PROCEDURE — 73564 X-RAY EXAM KNEE 4 OR MORE: CPT

## 2023-09-07 PROCEDURE — 82330 ASSAY OF CALCIUM: CPT

## 2023-09-07 PROCEDURE — 95700 EEG CONT REC W/VID EEG TECH: CPT

## 2023-09-07 PROCEDURE — 73080 X-RAY EXAM OF ELBOW: CPT

## 2023-09-07 PROCEDURE — 83735 ASSAY OF MAGNESIUM: CPT

## 2023-09-07 PROCEDURE — 73060 X-RAY EXAM OF HUMERUS: CPT

## 2023-09-07 PROCEDURE — 36415 COLL VENOUS BLD VENIPUNCTURE: CPT

## 2023-09-07 PROCEDURE — 80053 COMPREHEN METABOLIC PANEL: CPT

## 2023-09-07 PROCEDURE — 99239 HOSP IP/OBS DSCHRG MGMT >30: CPT | Mod: GC

## 2023-09-07 PROCEDURE — 84132 ASSAY OF SERUM POTASSIUM: CPT

## 2023-09-07 PROCEDURE — 84443 ASSAY THYROID STIM HORMONE: CPT

## 2023-09-07 PROCEDURE — 73090 X-RAY EXAM OF FOREARM: CPT

## 2023-09-07 PROCEDURE — 82550 ASSAY OF CK (CPK): CPT

## 2023-09-07 PROCEDURE — 80307 DRUG TEST PRSMV CHEM ANLYZR: CPT

## 2023-09-07 PROCEDURE — 82962 GLUCOSE BLOOD TEST: CPT

## 2023-09-07 PROCEDURE — 96375 TX/PRO/DX INJ NEW DRUG ADDON: CPT

## 2023-09-07 PROCEDURE — 80048 BASIC METABOLIC PNL TOTAL CA: CPT

## 2023-09-07 RX ORDER — POTASSIUM CHLORIDE 20 MEQ
40 PACKET (EA) ORAL ONCE
Refills: 0 | Status: COMPLETED | OUTPATIENT
Start: 2023-09-07 | End: 2023-09-07

## 2023-09-07 RX ADMIN — LEVETIRACETAM 400 MILLIGRAM(S): 250 TABLET, FILM COATED ORAL at 06:36

## 2023-09-07 RX ADMIN — Medication 40 MILLIEQUIVALENT(S): at 09:56

## 2023-09-07 NOTE — DISCHARGE NOTE PROVIDER - NSDCMRMEDTOKEN_GEN_ALL_CORE_FT
cloNIDine 0.1 mg oral tablet: 1 tab(s) orally 2 times a day  hydrALAZINE 25 mg oral tablet: 1 tab(s) orally 4 times a day  levETIRAcetam 750 mg oral tablet: 1 tab(s) orally once a day  Macrobid 100 mg oral capsule: 1 cap(s) orally 2 times a day   Outpatient physical therapy :   Outpatient Physical Therapy:   PHENobarbital 32.4 mg oral tablet: 1 tab(s) orally once a day (at bedtime) MDD:1 tablet at bedtime  PHENobarbital 32.4 mg oral tablet: 1 tab(s) orally once a day (at bedtime) MDD:1 tablet  PHENobarbital 64.8 mg oral tablet: 0.5 tab(s) orally once a day (at bedtime) MDD:32.4  Vitamin B1 100 mg oral tablet: 1 tab(s) orally once a day    cloNIDine 0.1 mg oral tablet: 1 tab(s) orally 2 times a day  hydrALAZINE 25 mg oral tablet: 1 tab(s) orally 4 times a day  levETIRAcetam 750 mg oral tablet: 1 tab(s) orally once a day  PHENobarbital 32.4 mg oral tablet: 1 tab(s) orally once a day (at bedtime) MDD:1 tablet at bedtime  PHENobarbital 32.4 mg oral tablet: 1 tab(s) orally once a day (at bedtime) MDD:1 tablet  PHENobarbital 64.8 mg oral tablet: 0.5 tab(s) orally once a day (at bedtime) MDD:32.4  Vitamin B1 100 mg oral tablet: 1 tab(s) orally once a day

## 2023-09-07 NOTE — DISCHARGE NOTE PROVIDER - PROVIDER TOKENS
PROVIDER:[TOKEN:[[id number]],FOLLOWUP:[1 week]],PROVIDER:[TOKEN:[[id number]],SCHEDULEDAPPT:[09/19/2023],SCHEDULEDAPPTTIME:[01:40 PM],ESTABLISHEDPATIENT:[T]]

## 2023-09-07 NOTE — DISCHARGE NOTE PROVIDER - CARE PROVIDER_API CALL
Wesly Gill  Orthopaedic Surgery  [address] [place] [address], [address]  [place] [address]  Phone: [phone]  Fax: [phone]  Follow Up Time: 1 week    Jeremiah Cantrell  Neurology  [address]  [place] [address]  Phone: [phone]  Fax: [phone]  Established Patient  Scheduled Appointment: 09/19/2023 01:40 PM

## 2023-09-07 NOTE — DISCHARGE NOTE NURSING/CASE MANAGEMENT/SOCIAL WORK - PATIENT PORTAL LINK FT
You can access the FollowMyHealth Patient Portal offered by [facility] by registering at the following website: [email or web address]. By joining [employer]’s FollowMyHealth portal, you will also be able to view your health information using other applications (apps) compatible with our system.

## 2023-09-07 NOTE — DISCHARGE NOTE PROVIDER - HOSPITAL COURSE
59yo F with PMH of HTN, alcohol abuse, seizures p/w LOC/pain. Patient fell at home and was noted to be shaking and grunting. Patient had a witnessed tonic-clonic seizure in the ED reportedly ~30 seconds. Vitals in ED were notable for /92, otherwise unremarkable. CT head showed no acute intracranial hemorrhage or calvarial fracture but positive for frontal scalp hematoma. Left elbow xray with a left olecranon fracture, ortho consulted. EKG with NSR and no ischemic changes and . Patient was admitted to MICU for seizures iso alc withdrawal. Follows neurologist outpatient and has been prescribed Keppra but was noncompliant, which we continued here. Started Ativan 2mg q1h for CIWA>8. vEEG without evidence of seizures. Patient doing much better today and denies n/v, anxiety, headaches, tremor, hallucinations. Ortho wants to operate on left olecranon fracture pending medical clearance. Patient is stable for stepdown to F. 57yo F with PMH of HTN, alcohol abuse, seizures p/w LOC/pain. Patient fell at home and was noted to be shaking and grunting. Patient had a witnessed tonic-clonic seizure in the ED reportedly ~30 seconds. Vitals in ED were notable for /92, otherwise unremarkable. CT head showed no acute intracranial hemorrhage or calvarial fracture but positive for frontal scalp hematoma. Left elbow xray with a left olecranon fracture, ortho consulted. EKG with NSR and no ischemic changes and . Patient was admitted to MICU for seizures iso alc withdrawal. Follows neurologist outpatient and has been prescribed Keppra but was noncompliant, which we continued here. Started Ativan 2mg q1h for CIWA>8. vEEG without evidence of seizures. Patient doing much better and denies n/v, anxiety, headaches, tremor, hallucinations. Refused L olecranon ORIF with ortho. Patient is stable for  discharge with Ortho f/u    #Tonic-clonic seizure.   Pt with AMS with previous ED visits and admissions in the setting of EtOH withdrawal. Pt with neg EtOH in UTox, was reportedly found this AM with bottle of liquor per  who was not at bedside. Pt s/p 3mg IV ativan, Keppra 500mg IVP @ 0600, and Morphine 4mg @ 0600. Sedation may be in the setting of EtOH withdrawal, post-ictal state, polypharmacy. Per HIE, pt last saw her epilepsy physician Dr. 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 Per documentation, pt was trialed on Topiramate 25mg at bedtime for several weeks to attempt to cut alcohol craving at that time.   Plan:  - c/w Keppra 500mg Q12h  - Ativan 2mg Q1h PRN for CIWA >8   - CIWA protocol Q4h, consider librium taper   - vEEG without abnormalities  - C/w high dose Thiamine & Folic acid     #Alcohol dependence with withdrawal.   Pt with known alcohol dependence. Last drink yesterday. Unknown CIWA score on admission due to AMS but on the F, CIWA now 0.  - plan as above.    #Hypertension.   ·  Plan: Pt intermittently hypertensive to 200/100 in ED. Pt with hx of HTN at home (on Clonidine 0.1mg at home). Now s/p 10mg of Hydralazine and 50mcg of Fentanyl in ED -> most recent /87. Labile BP may be in the setting of agitation 2/2 EtOH withdrawal vs pain in the setting of possible L forearm fracture. /79 without antihypertensive meds (9/6) s/p home Clonidine vs Precedex but d/kiara  - CTM, HDS for now.    #Fracture of left olecranon process.   Refused ORIF with ortho  - f/u outpatinet    #Macrocytic anemia.   ·  Plan: Presumed d/2 B12/Folate deficiency   - H&H lateral from previous admissions dating back to 7/2022  - CTM.    New medications: none  Labs to be followed outpatient: none  Exam to be followed outpatient: ortho f/u for olecranon fx    PHYSICAL EXAM:  GENERAL: AAO x4, B/l raccoon eyes, L Scalp hematoma.. Protecting airway and verbalizing pain in L arm.   HEAD:  B/l raccoon eyes, + L scalp hematoma with small, ~ .25cm area of skin abrasion on L forehead  EYES: EOMI, PRRR  NECK: Supple, No JVD, Normal thyroid, no enlarged nodes  NERVOUS SYSTEM: No clonus in b/l lower extremities, observed moving all extremities against gravity. No tremors at rest.   CHEST/LUNG: B/L good air entry; No rales, rhonchi, or wheezing  HEART: S1S2 normal, no S3, Regular rhythm and + Tachycardic   ABDOMEN: Soft, Nontender, Nondistended; Bowel sounds present  EXTREMITIES:  2+ Peripheral Pulses, No clubbing, cyanosis, or edema  LYMPH: No lymphadenopathy noted  SKIN: No rashes or lesions

## 2023-09-07 NOTE — DISCHARGE NOTE PROVIDER - NSDCCPCAREPLAN_GEN_ALL_CORE_FT
PRINCIPAL DISCHARGE DIAGNOSIS  Diagnosis: Seizure  Assessment and Plan of Treatment: You were admitted to the hospital for a seizure likely form alcohol withdrawal.Alcohol withdrawal is a life threatening condition. Please utilize our cessation and rehabilitation resources on discharge. Please go to your nearest emergency room if you suspect you may be withdrawing form alcohol. Please talk with your primary care provider about medications that can help to reduce cravings.      SECONDARY DISCHARGE DIAGNOSES  Diagnosis: Left elbow fracture  Assessment and Plan of Treatment: You were also found to have a fracture in your left elbow. Our orthopedic team will contact you for an appointment to treat this injury. Please attend this appointment as your fracture will not heal on its own.

## 2023-09-07 NOTE — DISCHARGE NOTE PROVIDER - ATTENDING DISCHARGE PHYSICAL EXAMINATION:
GENERAL: AAO x4, B/l bruising around eyes, L Scalp hematoma.. Protecting airway and verbalizing pain in L arm.   HEAD:  B/l raccoon eyes, + L scalp hematoma with small, ~ .25cm area of skin abrasion on L forehead  EYES: EOMI, PRRR  NECK: Supple, No JVD, Normal thyroid, no enlarged nodes  NERVOUS SYSTEM: No clonus in b/l lower extremities, observed moving all extremities against gravity. No tremors at rest.   CHEST/LUNG: B/L good air entry; No rales, rhonchi, or wheezing  HEART: S1S2 normal, no S3, Regular rhythm and + Tachycardic   ABDOMEN: Soft, Nontender, Nondistended; Bowel sounds present  EXTREMITIES:  2+ Peripheral Pulses, No clubbing, cyanosis, or edema  LYMPH: No lymphadenopathy noted  SKIN: No rashes or lesions

## 2023-09-07 NOTE — DISCHARGE NOTE NURSING/CASE MANAGEMENT/SOCIAL WORK - NSDCPEFALRISK_GEN_ALL_CORE
For information on Fall & Injury Prevention, visit: [email or web address] OR  [email or web address] OR  https://www.cdc.gov/steadi/patient.html

## 2023-09-07 NOTE — DISCHARGE NOTE PROVIDER - NSDCHC_MEDRECSTATUS_GEN_ALL_CORE
Subjective:       Patient ID: Arianne Johnson is a 75 y.o. female.    Chief Complaint: No chief complaint on file.    HPI     Recall that I saw patient on 8/29/17 for dizziness. Resolved since taking antivert    Taking meds for hld, osteoporosis and chronic rhinitis.     Review of Systems      Review of Systems   Constitutional: Negative for fever and chills.   HENT: Negative for hearing loss and neck stiffness.    Eyes: Negative for redness and itching.   Respiratory: Negative for cough and choking.    Cardiovascular: Negative for chest pain and leg swelling.  Abdomen: Negative for abdominal pain and blood in stool.   Genitourinary: Negative for dysuria and flank pain.   Musculoskeletal: Negative for back pain and gait problem.   Neurological: Negative for light-headedness and headaches.   Hematological: Negative for adenopathy.   Psychiatric/Behavioral: Negative for behavioral problems.       Objective:      Physical Exam   Constitutional: She appears well-developed.   HENT:   Head: Normocephalic and atraumatic.   Eyes: Conjunctivae are normal. Pupils are equal, round, and reactive to light.   Neck: Normal range of motion.   Cardiovascular: Normal rate and regular rhythm.    No murmur heard.  Pulmonary/Chest: Effort normal and breath sounds normal.   Lymphadenopathy:     She has no cervical adenopathy.       Assessment:       1. Vertigo    2. Hyperlipidemia, unspecified hyperlipidemia type    3. Osteoporosis, unspecified osteoporosis type, unspecified pathological fracture presence    4. Chronic rhinitis, unspecified type        Plan:       Vertigo    Hyperlipidemia, unspecified hyperlipidemia type    Osteoporosis, unspecified osteoporosis type, unspecified pathological fracture presence    Chronic rhinitis, unspecified type    Other orders  -     fluticasone (FLONASE) 50 mcg/actuation nasal spray; 2 sprays by Each Nare route once daily.  Dispense: 16 g; Refill: 11  -     DIPH,PERTUSS,ACEL,,TET VAC,PF,, ADULT,  (ADACEL) 2 Lf-(2.5-5-3-5 mcg)-5Lf/0.5 mL injection; Inject 0.5 mLs into the muscle once.  Dispense: 0.5 mL; Refill: 0          Plan:  Cont current meds    Medication List with Changes/Refills   Current Medications    ATORVASTATIN (LIPITOR) 40 MG TABLET    Take 1 tablet (40 mg total) by mouth once daily.    CETIRIZINE (ZYRTEC) 10 MG TABLET    Take 10 mg by mouth once daily.    IBANDRONATE (BONIVA) 150 MG TABLET    Take 1 tablet (150 mg total) by mouth every 30 days.    MECLIZINE (ANTIVERT) 25 MG TABLET    Take 1 tablet (25 mg total) by mouth 3 (three) times daily as needed.   Changed and/or Refilled Medications    Modified Medication Previous Medication    FLUTICASONE (FLONASE) 50 MCG/ACTUATION NASAL SPRAY fluticasone (FLONASE) 50 mcg/actuation nasal spray       2 sprays by Each Nare route once daily.    2 sprays by Each Nare route once daily.   Discontinued Medications    FAMOTIDINE (PEPCID) 20 MG TABLET    Take 1 tablet (20 mg total) by mouth 2 (two) times daily.    PANTOPRAZOLE (PROTONIX) 20 MG TABLET    Take 1 tablet (20 mg total) by mouth 2 (two) times daily before meals.        Admission Reconciliation is Not Complete  Discharge Reconciliation is Not Complete Admission Reconciliation is Completed  Discharge Reconciliation is Completed

## 2023-09-07 NOTE — PROGRESS NOTE ADULT - SUBJECTIVE AND OBJECTIVE BOX
INTERVAL HPI/OVERNIGHT EVENTS:  Patient seen and examined. Reports waking up yesterday evening, last thing she remembers prior to that was eating dinner at home.    MEDICATIONS  (STANDING):  chlorhexidine 2% Cloths 1 Application(s) Topical <User Schedule>  influenza   Vaccine 0.5 milliLiter(s) IntraMuscular once  levETIRAcetam  IVPB 500 milliGRAM(s) IV Intermittent every 12 hours    MEDICATIONS  (PRN):  LORazepam   Injectable 2 milliGRAM(s) IV Push every 1 hour PRN CIWA-Ar score 8 or greater      Allergies    No Known Allergies    Intolerances        Vital Signs Last 24 Hrs  T(C): 36.6 (06 Sep 2023 09:31), Max: 37.5 (05 Sep 2023 14:23)  T(F): 97.8 (06 Sep 2023 09:31), Max: 99.5 (05 Sep 2023 14:23)  HR: 82 (06 Sep 2023 09:00) (66 - 97)  BP: 136/79 (06 Sep 2023 09:00) (111/66 - 153/70)  BP(mean): 102 (06 Sep 2023 09:00) (82 - 103)  RR: 15 (06 Sep 2023 09:00) (12 - 29)  SpO2: 99% (06 Sep 2023 09:00) (94% - 99%)    Parameters below as of 06 Sep 2023 09:00  Patient On (Oxygen Delivery Method): room air        Physical exam:  Mental status: Awake , alert, oriented to time , place and person, follows command, no dysarthria or aphasia  Cranial nerves: intact EOM, PERRLA, symmetric face.   Motor: All extremities are antigravity except LUE  ( pain limited due to fracture) Normal tone and bulk.  No abnormal movements.    Sensation: intact and symmetric B/L  Coordination: Intact FTN B/L  Reflexes: 2+ in bilateral UE/LE, downgoing toes bilaterally.     LABS:                        9.9    4.80  )-----------( 188      ( 06 Sep 2023 06:01 )             30.9     09-06    133<L>  |  103  |  6<L>  ----------------------------<  111<H>  3.5   |  19<L>  |  0.50    Ca    8.8      06 Sep 2023 06:01  Phos  2.5     09-05  Mg     1.6     09-05    TPro  7.3  /  Alb  4.3  /  TBili  0.4  /  DBili  x   /  AST  33  /  ALT  17  /  AlkPhos  73  09-05      Urinalysis Basic - ( 06 Sep 2023 06:01 )    Color: x / Appearance: x / SG: x / pH: x  Gluc: 111 mg/dL / Ketone: x  / Bili: x / Urobili: x   Blood: x / Protein: x / Nitrite: x   Leuk Esterase: x / RBC: x / WBC x   Sq Epi: x / Non Sq Epi: x / Bacteria: x            
********STEPDOWN FROM MICU TO [facility]*********    Patient is a 59yo F with PMH of HTN, alcohol abuse, seizures p/w LOC/pain. Patient fell at home and was noted to be shaking and grunting. Patient had a witnessed tonic-clonic seizure in the ED reportedly ~30 seconds. Vitals in ED were notable for /92, otherwise unremarkable. CT head showed no acute intracranial hemorrhage or calvarial fracture but positive for frontal scalp hematoma. Left elbow xray with a left olecranon fracture, ortho consulted. EKG with NSR and no ischemic changes and . Patient was admitted to MICU for seizures iso alc withdrawal. Follows neurologist outpatient and has been prescribed Keppra but was noncompliant, which we continued here. Started Ativan 2mg q1h for CIWA>8. vEEG without evidence of seizures. Patient doing much better today and denies n/v, anxiety, headaches, tremor, hallucinations. Ortho wants to operate on left olecranon fracture pending medical clearance. Patient is stable for stepdown to [facility].      INTERVAL HPI/OVERNIGHT EVENTS:   10meq kcl repleted. fagan removed @ 10pm. BS <200 all night -- passed TOV.  Afebrile, hemodynamically stable     Subjective: Patient seen and examined at bedside. Patient found laying comfortably in bed. No acute complaints. Denies anxiety, headache, N/V, hallucinations, tremors. Admits to drinking 2 glasses of wine a day. Does not recall seizing in the ED.     ICU Vital Signs Last 24 Hrs  T(C): 36.6 (06 Sep 2023 09:31), Max: 37.5 (05 Sep 2023 14:23)  T(F): 97.8 (06 Sep 2023 09:31), Max: 99.5 (05 Sep 2023 14:23)  HR: 82 (06 Sep 2023 09:00) (66 - 100)  BP: 136/79 (06 Sep 2023 09:00) (111/66 - 153/70)  BP(mean): 102 (06 Sep 2023 09:00) (82 - 103)  ABP: --  ABP(mean): --  RR: 15 (06 Sep 2023 09:00) (12 - 29)  SpO2: 99% (06 Sep 2023 09:00) (94% - 99%)    O2 Parameters below as of 06 Sep 2023 09:00  Patient On (Oxygen Delivery Method): room air          I&O's Summary    05 Sep 2023 07:01  -  06 Sep 2023 07:00  --------------------------------------------------------  IN: 249 mL / OUT: 1315 mL / NET: -1066 mL          PHYSICAL EXAM:  GENERAL: AAO x4, B/l raccoon eyes, L Scalp hematoma.. Protecting airway and verbalizing pain in L arm.   HEAD:  B/l raccoon eyes, + L scalp hematoma with small, ~ .25cm area of skin abrasion on L forehead  EYES: EOMI, PRRR  NECK: Supple, No JVD, Normal thyroid, no enlarged nodes  NERVOUS SYSTEM: No clonus in b/l lower extremities, observed moving all extremities against gravity. No tremors at rest.   CHEST/LUNG: B/L good air entry; No rales, rhonchi, or wheezing  HEART: S1S2 normal, no S3, Regular rhythm and + Tachycardic   ABDOMEN: Soft, Nontender, Nondistended; Bowel sounds present  EXTREMITIES:  2+ Peripheral Pulses, No clubbing, cyanosis, or edema  LYMPH: No lymphadenopathy noted  SKIN: No rashes or lesions    LABS:                        9.9    4.80  )-----------( 188      ( 06 Sep 2023 06:01 )             30.9     09-06    133<L>  |  103  |  6<L>  ----------------------------<  111<H>  3.5   |  19<L>  |  0.50    Ca    8.8      06 Sep 2023 06:01  Phos  2.5     09-05  Mg     1.6     09-05    TPro  7.3  /  Alb  4.3  /  TBili  0.4  /  DBili  x   /  AST  33  /  ALT  17  /  AlkPhos  73  09-05      Urinalysis Basic - ( 06 Sep 2023 06:01 )    Color: x / Appearance: x / SG: x / pH: x  Gluc: 111 mg/dL / Ketone: x  / Bili: x / Urobili: x   Blood: x / Protein: x / Nitrite: x   Leuk Esterase: x / RBC: x / WBC x   Sq Epi: x / Non Sq Epi: x / Bacteria: x      CAPILLARY BLOOD GLUCOSE            Consultant(s) Notes Reviewed:  [x ] YES  [ ] NO    MEDICATIONS  (STANDING):  chlorhexidine 2% Cloths 1 Application(s) Topical <User Schedule>  influenza   Vaccine 0.5 milliLiter(s) IntraMuscular once  levETIRAcetam  IVPB 500 milliGRAM(s) IV Intermittent every 12 hours    MEDICATIONS  (PRN):  LORazepam   Injectable 2 milliGRAM(s) IV Push every 1 hour PRN CIWA-Ar score 8 or greater      Care Discussed with Consultants/Other Providers [ x] YES  [ ] NO    RADIOLOGY & ADDITIONAL TESTS:
********STEPDOWN FROM MICU TO [facility]*********    Patient is a 59yo F with PMH of HTN, alcohol abuse, seizures p/w LOC/pain. Patient fell at home and was noted to be shaking and grunting. Patient had a witnessed tonic-clonic seizure in the ED reportedly ~30 seconds. Vitals in ED were notable for /92, otherwise unremarkable. CT head showed no acute intracranial hemorrhage or calvarial fracture but positive for frontal scalp hematoma. Left elbow xray with a left olecranon fracture, ortho consulted. EKG with NSR and no ischemic changes and . Patient was admitted to MICU for seizures iso alc withdrawal. Follows neurologist outpatient and has been prescribed Keppra but was noncompliant, which we continued here. Started Ativan 2mg q1h for CIWA>8. vEEG without evidence of seizures. Patient doing much better today and denies n/v, anxiety, headaches, tremor, hallucinations. Ortho wants to operate on left olecranon fracture pending medical clearance. Patient is stable for stepdown to [facility].      INTERVAL HPI/OVERNIGHT EVENTS:   10meq kcl repleted. fagan removed @ 10pm. BS <200 all night -- passed TOV.  Afebrile, hemodynamically stable     Subjective: Patient seen and examined at bedside. Patient found laying comfortably in bed. No acute complaints. Denies anxiety, headache, N/V, hallucinations, tremors. Admits to drinking 2 glasses of wine a day. Does not recall seizing in the ED.     ICU Vital Signs Last 24 Hrs  T(C): 36.6 (06 Sep 2023 09:31), Max: 37.5 (05 Sep 2023 14:23)  T(F): 97.8 (06 Sep 2023 09:31), Max: 99.5 (05 Sep 2023 14:23)  HR: 82 (06 Sep 2023 09:00) (66 - 100)  BP: 136/79 (06 Sep 2023 09:00) (111/66 - 153/70)  BP(mean): 102 (06 Sep 2023 09:00) (82 - 103)  ABP: --  ABP(mean): --  RR: 15 (06 Sep 2023 09:00) (12 - 29)  SpO2: 99% (06 Sep 2023 09:00) (94% - 99%)    O2 Parameters below as of 06 Sep 2023 09:00  Patient On (Oxygen Delivery Method): room air          I&O's Summary    05 Sep 2023 07:01  -  06 Sep 2023 07:00  --------------------------------------------------------  IN: 249 mL / OUT: 1315 mL / NET: -1066 mL          PHYSICAL EXAM:  GENERAL: AAO x4, B/l raccoon eyes, L Scalp hematoma.. Protecting airway and verbalizing pain in L arm.   HEAD:  B/l raccoon eyes, + L scalp hematoma with small, ~ .25cm area of skin abrasion on L forehead  EYES: EOMI, PRRR  NECK: Supple, No JVD, Normal thyroid, no enlarged nodes  NERVOUS SYSTEM: No clonus in b/l lower extremities, observed moving all extremities against gravity. No tremors at rest.   CHEST/LUNG: B/L good air entry; No rales, rhonchi, or wheezing  HEART: S1S2 normal, no S3, Regular rhythm and + Tachycardic   ABDOMEN: Soft, Nontender, Nondistended; Bowel sounds present  EXTREMITIES:  2+ Peripheral Pulses, No clubbing, cyanosis, or edema  LYMPH: No lymphadenopathy noted  SKIN: No rashes or lesions    LABS:                        9.9    4.80  )-----------( 188      ( 06 Sep 2023 06:01 )             30.9     09-06    133<L>  |  103  |  6<L>  ----------------------------<  111<H>  3.5   |  19<L>  |  0.50    Ca    8.8      06 Sep 2023 06:01  Phos  2.5     09-05  Mg     1.6     09-05    TPro  7.3  /  Alb  4.3  /  TBili  0.4  /  DBili  x   /  AST  33  /  ALT  17  /  AlkPhos  73  09-05      Urinalysis Basic - ( 06 Sep 2023 06:01 )    Color: x / Appearance: x / SG: x / pH: x  Gluc: 111 mg/dL / Ketone: x  / Bili: x / Urobili: x   Blood: x / Protein: x / Nitrite: x   Leuk Esterase: x / RBC: x / WBC x   Sq Epi: x / Non Sq Epi: x / Bacteria: x      CAPILLARY BLOOD GLUCOSE            Consultant(s) Notes Reviewed:  [x ] YES  [ ] NO    MEDICATIONS  (STANDING):  chlorhexidine 2% Cloths 1 Application(s) Topical <User Schedule>  influenza   Vaccine 0.5 milliLiter(s) IntraMuscular once  levETIRAcetam  IVPB 500 milliGRAM(s) IV Intermittent every 12 hours    MEDICATIONS  (PRN):  LORazepam   Injectable 2 milliGRAM(s) IV Push every 1 hour PRN CIWA-Ar score 8 or greater      Care Discussed with Consultants/Other Providers [ x] YES  [ ] NO    RADIOLOGY & ADDITIONAL TESTS:
  Ortho Note    Pt comfortable  pain controlled  Denies CP, SOB, N/V, numbness/tingling       Vital Signs Last 24 Hrs  T(C): 36.7 (07 Sep 2023 05:41), Max: 37 (06 Sep 2023 20:39)  T(F): 98.1 (07 Sep 2023 05:41), Max: 98.6 (06 Sep 2023 20:39)  HR: 59 (07 Sep 2023 05:41) (59 - 82)  BP: 125/75 (07 Sep 2023 05:41) (117/96 - 143/74)  BP(mean): 90 (06 Sep 2023 11:00) (90 - 103)  RR: 19 (07 Sep 2023 05:41) (12 - 29)  SpO2: 96% (07 Sep 2023 05:41) (96% - 99%)    Parameters below as of 07 Sep 2023 05:41  Patient On (Oxygen Delivery Method): room air      General: Pt Alert and oriented, NAD  LLE,  Webril around the arm, not wearing posterior slab or sling  Pulses: 2+ radial  Sensation: SILT to Ax/ulnar/radial/median  Motor: Ax,AIN,PIN,ulnar           LABS:                          9.9    4.80  )-----------( 188      ( 06 Sep 2023 06:01 )             30.9     09-06    133<L>  |  103  |  6<L>  ----------------------------<  111<H>  3.5   |  19<L>  |  0.50    Ca    8.8      06 Sep 2023 06:01  Phos  2.5     09-05  Mg     1.6     09-05    TPro  7.3  /  Alb  4.3  /  TBili  0.4  /  DBili  x   /  AST  33  /  ALT  17  /  AlkPhos  73  09-05    LIVER FUNCTIONS - ( 05 Sep 2023 06:46 )  Alb: 4.3 g/dL / Pro: 7.3 g/dL / ALK PHOS: 73 U/L / ALT: 17 U/L / AST: 33 U/L / GGT: x             Urinalysis Basic - ( 06 Sep 2023 06:01 )    Color: x / Appearance: x / SG: x / pH: x  Gluc: 111 mg/dL / Ketone: x  / Bili: x / Urobili: x   Blood: x / Protein: x / Nitrite: x   Leuk Esterase: x / RBC: x / WBC x   Sq Epi: x / Non Sq Epi: x / Bacteria: x            A/P: 58yFemale with L olecranon fx  - Patient not interested in surgery can follow up outpatient with Dr. Gill  - ANDER TEJEDA in sling and posterior slab  - remainder of care per primary  Ortho Pager [phone]
Ortho Note    Pt comfortable without complaints, pain controlled  Denies CP, SOB, N/V, numbness/tingling     Vital Signs Last 24 Hrs  T(C): 36.7 (09-06-23 @ 01:04), Max: 36.7 (09-06-23 @ 01:04)  T(F): 98.1 (09-06-23 @ 01:04), Max: 98.1 (09-06-23 @ 01:04)  HR: 70 (09-06-23 @ 04:00) (70 - 82)  BP: 132/68 (09-06-23 @ 04:00) (120/57 - 153/70)  BP(mean): 94 (09-06-23 @ 04:00) (82 - 100)  RR: 19 (09-06-23 @ 04:00) (12 - 29)  SpO2: 98% (09-06-23 @ 04:00) (96% - 98%)  I&O's Summary    05 Sep 2023 07:01  -  06 Sep 2023 05:19  --------------------------------------------------------  IN: 249 mL / OUT: 1315 mL / NET: -1066 mL        General: Pt Alert and oriented, NAD  LLE,  In sling and posterior slab, ace wrap  Pulses: 2+ radial  Sensation: SILT to Ax/ulnar/radial/median  Motor: Ax,AIN,PIN,ulnar                          11.4   7.12  )-----------( 331      ( 05 Sep 2023 06:46 )             35.0     09-05    136  |  96  |  6<L>  ----------------------------<  144<H>  4.2   |  26  |  0.41<L>    Ca    9.6      05 Sep 2023 06:46  Phos  2.5     09-05  Mg     1.6     09-05    TPro  7.3  /  Alb  4.3  /  TBili  0.4  /  DBili  x   /  AST  33  /  ALT  17  /  AlkPhos  73  09-05          A/P: 58yFemale with L olecranon fx  - - Plan for Left olecranon ORIF, Please document medical clearance for OR  - NWB LUE in sling and posterior slab  - remainder of care per primary  Ortho Pager [phone]

## 2023-09-12 DIAGNOSIS — R56.9 UNSPECIFIED CONVULSIONS: ICD-10-CM

## 2023-09-12 DIAGNOSIS — E53.8 DEFICIENCY OF OTHER SPECIFIED B GROUP VITAMINS: ICD-10-CM

## 2023-09-12 DIAGNOSIS — S00.03XA CONTUSION OF SCALP, INITIAL ENCOUNTER: ICD-10-CM

## 2023-09-12 DIAGNOSIS — M25.561 PAIN IN RIGHT KNEE: ICD-10-CM
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
## 2023-09-12 DIAGNOSIS — T42.6X6A UNDERDOSING OF OTHER ANTIEPILEPTIC AND SEDATIVE-HYPNOTIC DRUGS, INITIAL ENCOUNTER: ICD-10-CM
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
## 2023-09-12 DIAGNOSIS — Z28.9 IMMUNIZATION NOT CARRIED OUT FOR UNSPECIFIED REASON: ICD-10-CM

## 2023-09-12 DIAGNOSIS — R94.31 ABNORMAL ELECTROCARDIOGRAM [ECG] [EKG]: ICD-10-CM

## 2023-09-12 DIAGNOSIS — I10 ESSENTIAL (PRIMARY) HYPERTENSION: ICD-10-CM

## 2023-09-12 DIAGNOSIS — Z91.148 PATIENT'S OTHER NONCOMPLIANCE WITH MEDICATION REGIMEN FOR OTHER REASON: ICD-10-CM

## 2023-09-12 DIAGNOSIS — Z53.20 PROCEDURE AND TREATMENT NOT CARRIED OUT BECAUSE OF PATIENT'S DECISION FOR UNSPECIFIED REASONS: ICD-10-CM

## 2023-09-12 DIAGNOSIS — S00.81XA ABRASION OF OTHER PART OF HEAD, INITIAL ENCOUNTER: ICD-10-CM

## 2023-09-12 DIAGNOSIS — R06.02 SHORTNESS OF BREATH: ICD-10-CM

## 2023-09-12 DIAGNOSIS — Z79.899 OTHER LONG TERM (CURRENT) DRUG THERAPY: ICD-10-CM

## 2023-09-12 DIAGNOSIS — D53.9 NUTRITIONAL ANEMIA, UNSPECIFIED: ICD-10-CM

## 2023-09-12 DIAGNOSIS — S52.022A DISPLACED FRACTURE OF OLECRANON PROCESS WITHOUT INTRAARTICULAR EXTENSION OF LEFT ULNA, INITIAL ENCOUNTER FOR CLOSED FRACTURE: ICD-10-CM

## 2023-09-12 DIAGNOSIS — Z91.199 PATIENT'S NONCOMPLIANCE WITH OTHER MEDICAL TREATMENT AND REGIMEN DUE TO UNSPECIFIED REASON: ICD-10-CM

## 2023-09-12 DIAGNOSIS — F10.239 ALCOHOL DEPENDENCE WITH WITHDRAWAL, UNSPECIFIED: ICD-10-CM

## 2023-09-12 DIAGNOSIS — G40.89 OTHER SEIZURES: ICD-10-CM

## 2023-09-19 ENCOUNTER — APPOINTMENT (OUTPATIENT)
Dept: NEUROLOGY | Facility: CLINIC | Age: 58
End: 2023-09-19
Payer: COMMERCIAL

## 2023-09-19 VITALS
BODY MASS INDEX: 19.14 KG/M2 | WEIGHT: 104 LBS | HEIGHT: 62 IN | DIASTOLIC BLOOD PRESSURE: 102 MMHG | SYSTOLIC BLOOD PRESSURE: 166 MMHG | HEART RATE: 89 BPM | OXYGEN SATURATION: 96 % | TEMPERATURE: 96.3 F

## 2023-09-19 PROCEDURE — 99214 OFFICE O/P EST MOD 30 MIN: CPT

## 2023-09-21 ENCOUNTER — APPOINTMENT (OUTPATIENT)
Dept: ORTHOPEDIC SURGERY | Facility: CLINIC | Age: 58
End: 2023-09-21
Payer: COMMERCIAL

## 2023-09-21 VITALS — WEIGHT: 135 LBS | HEIGHT: 63 IN | RESPIRATION RATE: 16 BRPM | BODY MASS INDEX: 23.92 KG/M2

## 2023-09-21 PROCEDURE — 99213 OFFICE O/P EST LOW 20 MIN: CPT | Mod: NC

## 2023-09-21 PROCEDURE — 73070 X-RAY EXAM OF ELBOW: CPT | Mod: LT

## 2023-10-12 NOTE — ED ADULT NURSE NOTE - DISCHARGE DATE/TIME
PATIENT INSTRUCTIONS    Treatment:      Infection Signs    Be aware that infection can occur as a complication. Contact the Clinic, Immediate Care, or [facility] on-call (if after clinic hours) if you notice any of the following:  Increased swelling after 24 hours.  Excessive redness (if sutures remain in longer than 10 days, redness may develop around them).  Increasing pain that is not relieved after elevating for 20 minutes.  Excessive heat, either locally or systemically, as reflected by a fever.  Excessive drainage from the wound.  If continuous bleeding occurs after applying direct pressure to the site for 15 minutes constantly.     Follow-Up:  Please make an appointment with Dr. Florencio Guadalupe in 1.5 months            Please note: 24 hour notice for cancellation of appointment is required.    You may receive a survey in the mail, or via the e-mail address that you have provided.  We would appreciate if you could fill out the survey and provide us with any feedback on your experience regarding your visit today. Thank you for allowing us to provide you with your health care needs.     Do not hesitate to call if you are experiencing severe pain, worsening or change in your pain, have symptoms of infection (fever, warmth, redness, increased drainage), or have any other problem that concerns you ~ [phone] (or [phone] after hours).    Please remember when requesting refills on pain medication that the request should be made by Thursday at the latest. [facility] is open Monday-Friday, 8am-5pm, and closed on the weekends.  No narcotic refills will be filled after hours.    Additional Educational Resources:  For additional resources regarding your symptoms, diagnosis, or further health information, please visit the Health Resources section on [email or web address] or the Online Health Resources section in [employer].        25-Jan-2022 04:05

## 2023-10-19 ENCOUNTER — APPOINTMENT (OUTPATIENT)
Dept: ORTHOPEDIC SURGERY | Facility: CLINIC | Age: 58
End: 2023-10-19
Payer: COMMERCIAL

## 2023-10-19 DIAGNOSIS — S52.022A DISPLACED FRACTURE OF OLECRANON PROCESS W/OUT INTRAARTICULAR EXTENSION OF LEFT ULNA, INITIAL ENCOUNTER FOR CLOSED FRACTURE: ICD-10-CM

## 2023-10-19 PROCEDURE — 73070 X-RAY EXAM OF ELBOW: CPT | Mod: LT

## 2023-10-19 PROCEDURE — 99213 OFFICE O/P EST LOW 20 MIN: CPT

## 2023-12-08 ENCOUNTER — EMERGENCY (EMERGENCY)
Facility: HOSPITAL | Age: 58
LOS: 1 days | Discharge: HOME | End: 2023-12-08
Admitting: EMERGENCY MEDICINE
Payer: COMMERCIAL

## 2023-12-08 VITALS
HEART RATE: 88 BPM | DIASTOLIC BLOOD PRESSURE: 77 MMHG | OXYGEN SATURATION: 96 % | TEMPERATURE: 98 F | SYSTOLIC BLOOD PRESSURE: 128 MMHG | RESPIRATION RATE: 18 BRPM
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
## 2023-12-08 DIAGNOSIS — I10 ESSENTIAL (PRIMARY) HYPERTENSION: ICD-10-CM
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
## 2023-12-08 DIAGNOSIS — S50.812A ABRASION OF LEFT FOREARM, INITIAL ENCOUNTER: ICD-10-CM

## 2023-12-08 DIAGNOSIS — R46.89 OTHER SYMPTOMS AND SIGNS INVOLVING APPEARANCE AND BEHAVIOR: ICD-10-CM

## 2023-12-08 DIAGNOSIS — F10.90 ALCOHOL USE, UNSPECIFIED, UNCOMPLICATED: ICD-10-CM

## 2023-12-08 DIAGNOSIS — R56.9 UNSPECIFIED CONVULSIONS: ICD-10-CM

## 2023-12-08 PROCEDURE — 99283 EMERGENCY DEPT VISIT LOW MDM: CPT

## 2023-12-08 PROCEDURE — 99282 EMERGENCY DEPT VISIT SF MDM: CPT

## 2023-12-08 NOTE — ED ADULT TRIAGE NOTE - ARRIVAL INFO ADDITIONAL COMMENTS
pt's partner called 911 after argument.  [employer] has been to the apartment [place] [employer].   pt admits to etoh.  arrives [employer] and [place].   scrape to left forearm with dried blood and pt does not know how it happened. pt's partner called 911 after argument.  [employer] has been to the apartment x[place] [employer].   pt admits to etoh.  arrives [employer] and [place].   scrape to left forearm with dried blood and pt does not know how it happened.

## 2023-12-08 NOTE — ED ADULT NURSE NOTE - OBJECTIVE STATEMENT
59 yo F reports having been brought to the hospital because she passed out but does not remember. Pt denies any pain or any other symptom. Denies SI/HI. Reports her 's father came to live with them recently and that family dynamics have changed. 57 yo F reports having been brought to the hospital because she passed out but does not remember. Pt denies any pain or any other symptom. Denies SI/HI. Reports her 's father came to live with them recently and that family dynamics have changed.

## 2023-12-08 NOTE — ED ADULT NURSE NOTE - NSFALLUNIVINTERV_ED_ALL_ED
Bed/Stretcher in lowest position, wheels locked, appropriate side rails in place/Call bell, personal items and telephone in reach/Instruct patient to call for assistance before getting out of bed/chair/stretcher/Non-slip footwear applied when patient is off stretcher/[place] to call system/Physically safe environment - no spills, clutter or unnecessary equipment/Purposeful proactive rounding/Room/bathroom lighting operational, light cord in reach Bed/Stretcher in lowest position, wheels locked, appropriate side rails in place/Call bell, personal items and telephone in reach/Instruct patient to call for assistance before getting out of bed/chair/stretcher/Non-slip footwear applied when patient is off stretcher/[place] to call system/Physically safe environment - no spills, clutter or unnecessary equipment/Purposeful proactive rounding/Room/bathroom lighting operational, light cord in reach

## 2023-12-09 NOTE — ED PROVIDER NOTE - NSFOLLOWUPINSTRUCTIONS_ED_ALL_ED_FT
Substance Abuse    Chemical dependency is an addiction to drugs or alcohol. It is characterized by the repeated behavior of seeking out and using drugs and alcohol despite harmful consequences to the health and safety of oneself and others. Using drugs in a manner that brought you to an Emergency Room suggests you may have an drug abuse problem. Seek help at a drug addiction center.    SEEK IMMEDIATE MEDICAL CARE IF YOU HAVE ANY OF THE FOLLOWING SYMPTOMS: chest pain, shortness of breath, change in mental status, thoughts about hurting killing yourself, thoughts about hurting or killing somebody else, hallucinations, or worsening depression. Abrasion    WHAT YOU NEED TO KNOW:    What is an abrasion? An abrasion is a wound on your skin. Abrasions usually happen when your skin rubs against a rough surface. Examples of an abrasion include rug burn, a skinned elbow, or road rash. Abrasions can be deep or shallow The wound may hurt, bleed, bruise, or swell.    How can I care for my abrasion?    Wash your hands and dry them with a clean towel first.    Press a clean cloth against your wound for 5 to 10 minutes to stop any bleeding.    Rinse your wound with clean water. Do not use harsh soap, alcohol, or iodine solutions.    Use a clean, wet cloth to remove any objects, such as small pieces of rocks or dirt.    Rub antibiotic ointment on your wound. This may help prevent infection and help your wound heal.    Cover the wound with a non-stick bandage. Change the bandage daily, and if it gets wet or dirty.  When should I seek immediate care?    The bleeding does not stop after 10 minutes of firm pressure.    The redness around your wound begins to spread.    You cannot rinse one or more foreign objects out of your wound.  When should I call my doctor?    You have a fever or chills.    Your abrasion is red, warm, swollen, or draining pus.    You have questions or concerns about your condition or care.  CARE AGREEMENT:    You have the right to help plan your care. Learn about your health condition and how it may be treated. Discuss treatment options with your healthcare providers to decide what care you want to receive. You always have the right to refuse treatment.        Substance Abuse    Chemical dependency is an addiction to drugs or alcohol. It is characterized by the repeated behavior of seeking out and using drugs and alcohol despite harmful consequences to the health and safety of oneself and others. Using drugs in a manner that brought you to an Emergency Room suggests you may have an drug abuse problem. Seek help at a drug addiction center.    SEEK IMMEDIATE MEDICAL CARE IF YOU HAVE ANY OF THE FOLLOWING SYMPTOMS: chest pain, shortness of breath, change in mental status, thoughts about hurting killing yourself, thoughts about hurting or killing somebody else, hallucinations, or worsening depression.

## 2023-12-09 NOTE — ED PROVIDER NOTE - PATIENT PORTAL LINK FT
You can access the FollowMyHealth Patient Portal offered by [facility] by registering at the following website: [email or web address]. By joining [employer]’s FollowMyHealth portal, you will also be able to view your health information using other applications (apps) compatible with our system. You can access the FollowMyHealth Patient Portal offered by [facility] by registering at the following website: [email or web address]. By joining [employer]’s FollowMyHealth portal, you will also be able to view your health information using other applications (apps) compatible with our system.

## 2023-12-09 NOTE — ED PROVIDER NOTE - PHYSICAL EXAMINATION
Vitals reviewed  Gen: comfortable appearing, calm/cooperative, in nad, speaking in full sentences  Skin: wwp, no rash, abrasion L forearm, no active bleeding  HEENT: ncat, eomi, perrla, mmm  Neck/Back: no midline ttp/step off, no paraspinal ttp   CV: rrr, no audible m/r/g  Resp: symmetrical expansion, ctab, no w/r/r  Ext: FROM throughout, no joint deformity or ttp, no peripheral edema, 5/5 strength all ext, SILT equal throughout, distal pulses 2+  Neuro: alert/oriented x3, no focal deficits, steady gait without assistance

## 2023-12-12 ENCOUNTER — APPOINTMENT (OUTPATIENT)
Dept: ORTHOPEDIC SURGERY | Facility: CLINIC | Age: 58
End: 2023-12-12
Payer: COMMERCIAL

## 2023-12-12 DIAGNOSIS — S52.023A DISPLACED FRACTURE OF OLECRANON PROCESS W/OUT INTRAARTICULAR EXTENSION OF UNSPECIFIED ULNA, INITIAL ENCOUNTER FOR CLOSED FRACTURE: ICD-10-CM

## 2023-12-12 PROCEDURE — 73070 X-RAY EXAM OF ELBOW: CPT | Mod: LT

## 2023-12-12 PROCEDURE — 99213 OFFICE O/P EST LOW 20 MIN: CPT

## 2024-01-08 NOTE — ED ADULT NURSE NOTE - ABDOMEN
[place]    Name: Harley Domínguez       Question 1   Rx Obtained   Have you been able to get all of your prescribed medications? Please press 1 if you have, press 2 if you have not, or press 3 if you were not prescribed any medications.   Does Not Have Rx (Issue Panel: [place] Clinical Intervention, Issue Alert: [place] Clinical Alert)     Question 2   SDOH Concerns   Do you have any concerns affording medication or food,? Press 1 if yes or press 2 if no.   Has SDOH Questions (Issue Panel: [place] Clinical Intervention, Issue Alert: [place] Clinical Alert)         Obtaining Rx - Issues         Obtaining Rx - Issues List:     Awaiting Approval     Comments:     Patient states he is awaiting insurance approval on medications due to changes in insurance, patient has spoken to pharmacy and insurance at this time already.  Denies any needs for assistance with obtaining medications at this time.       Follow Up Appointment - Issues         Follow Up Appointment - Issues List:     Other (Add Details in Comments)     Comments:     Patient states he will set up appointment, declined need for assistance with scheduling at this time.       Follow Up Appointment - Actions Taken         Follow Up Appointment - Actions List:     Encouraged Patient to Make a F/U Appt Sooner      SDOH - Issues         SDOH - Issues List:     Wrong Button Pressed     Wrong Button Pressed         Call 1 :     SDOH Concerns    soft

## 2024-02-08 NOTE — ED ADULT NURSE NOTE - TEMPLATE
0900: Patient in fetal position on floor without clothes on.  Reportedly has been this way and not able to stay in bed.  Patient nonverbal, withdrawn, altered.  Sitter at bedside for safety.  Refusing medication.  Able to obtain vitals, but difficult and patient was very anxious, uncomfortable during this, moaning and shaking.    1400: Orders for 2 mg IM Versed and also IM thorazine.  Given IM in BL gluteal muscles without difficulty.  Patient still refusing to take PO.     Neuro

## 2024-02-13 ENCOUNTER — APPOINTMENT (OUTPATIENT)
Dept: NEUROLOGY | Facility: CLINIC | Age: 59
End: 2024-02-13

## 2024-03-03 VITALS
WEIGHT: 130.07 LBS | HEIGHT: 64 IN | SYSTOLIC BLOOD PRESSURE: 108 MMHG | TEMPERATURE: 98 F | DIASTOLIC BLOOD PRESSURE: 78 MMHG | OXYGEN SATURATION: 100 % | HEART RATE: 102 BPM | RESPIRATION RATE: 17 BRPM

## 2024-03-03 LAB
ANION GAP SERPL CALC-SCNC: 26 MMOL/L — HIGH (ref 5–17)
BASE EXCESS BLDV CALC-SCNC: -4.4 MMOL/L — LOW (ref -2–3)
BASOPHILS # BLD AUTO: 0 K/UL — SIGNIFICANT CHANGE UP (ref 0–0.2)
BASOPHILS NFR BLD AUTO: 0 % — SIGNIFICANT CHANGE UP (ref 0–2)
BUN SERPL-MCNC: 23 MG/DL — SIGNIFICANT CHANGE UP (ref 7–23)
CA-I SERPL-SCNC: 1.2 MMOL/L — SIGNIFICANT CHANGE UP (ref 1.15–1.33)
CALCIUM SERPL-MCNC: 10.2 MG/DL — SIGNIFICANT CHANGE UP (ref 8.4–10.5)
CHLORIDE SERPL-SCNC: 84 MMOL/L — LOW (ref 96–108)
CO2 BLDV-SCNC: 21.3 MMOL/L — LOW (ref 22–26)
CO2 SERPL-SCNC: 15 MMOL/L — LOW (ref 22–31)
CREAT SERPL-MCNC: 0.86 MG/DL — SIGNIFICANT CHANGE UP (ref 0.5–1.3)
EGFR: 78 ML/MIN/1.73M2 — SIGNIFICANT CHANGE UP
EOSINOPHIL # BLD AUTO: 0 K/UL — SIGNIFICANT CHANGE UP (ref 0–0.5)
EOSINOPHIL NFR BLD AUTO: 0 % — SIGNIFICANT CHANGE UP (ref 0–6)
ETHANOL SERPL-MCNC: <10 MG/DL — SIGNIFICANT CHANGE UP (ref 0–10)
GAS PNL BLDV: 125 MMOL/L — LOW (ref 136–145)
GAS PNL BLDV: SIGNIFICANT CHANGE UP
GAS PNL BLDV: SIGNIFICANT CHANGE UP
GLUCOSE SERPL-MCNC: 188 MG/DL — HIGH (ref 70–99)
HCO3 BLDV-SCNC: 20 MMOL/L — LOW (ref 22–29)
HCT VFR BLD CALC: 33.1 % — LOW (ref 34.5–45)
HGB BLD-MCNC: 11.7 G/DL — SIGNIFICANT CHANGE UP (ref 11.5–15.5)
HYPOCHROMIA BLD QL: SLIGHT — SIGNIFICANT CHANGE UP
LYMPHOCYTES # BLD AUTO: 1.12 K/UL — SIGNIFICANT CHANGE UP (ref 1–3.3)
LYMPHOCYTES # BLD AUTO: 16.8 % — SIGNIFICANT CHANGE UP (ref 13–44)
MANUAL SMEAR VERIFICATION: SIGNIFICANT CHANGE UP
MCHC RBC-ENTMCNC: 32.9 PG — SIGNIFICANT CHANGE UP (ref 27–34)
MCHC RBC-ENTMCNC: 35.3 GM/DL — SIGNIFICANT CHANGE UP (ref 32–36)
MCV RBC AUTO: 93 FL — SIGNIFICANT CHANGE UP (ref 80–100)
METAMYELOCYTES # FLD: 0.9 % — HIGH (ref 0–0)
MONOCYTES # BLD AUTO: 0.53 K/UL — SIGNIFICANT CHANGE UP (ref 0–0.9)
MONOCYTES NFR BLD AUTO: 8 % — SIGNIFICANT CHANGE UP (ref 2–14)
NEUTROPHILS # BLD AUTO: 4.93 K/UL — SIGNIFICANT CHANGE UP (ref 1.8–7.4)
NEUTROPHILS NFR BLD AUTO: 73.4 % — SIGNIFICANT CHANGE UP (ref 43–77)
NEUTS BAND # BLD: 0.9 % — SIGNIFICANT CHANGE UP (ref 0–8)
PCO2 BLDV: 35 MMHG — LOW (ref 39–42)
PH BLDV: 7.37 — SIGNIFICANT CHANGE UP (ref 7.32–7.43)
PLAT MORPH BLD: NORMAL — SIGNIFICANT CHANGE UP
PLATELET # BLD AUTO: 250 K/UL — SIGNIFICANT CHANGE UP (ref 150–400)
PO2 BLDV: 30 MMHG — SIGNIFICANT CHANGE UP (ref 25–45)
POTASSIUM BLDV-SCNC: 2.8 MMOL/L — CRITICAL LOW (ref 3.5–5.1)
POTASSIUM SERPL-MCNC: SIGNIFICANT CHANGE UP (ref 3.5–5.3)
POTASSIUM SERPL-SCNC: SIGNIFICANT CHANGE UP (ref 3.5–5.3)
RBC # BLD: 3.56 M/UL — LOW (ref 3.8–5.2)
RBC # FLD: 12.9 % — SIGNIFICANT CHANGE UP (ref 10.3–14.5)
RBC BLD AUTO: ABNORMAL
SAO2 % BLDV: 44.8 % — LOW (ref 67–88)
SODIUM SERPL-SCNC: 125 MMOL/L — LOW (ref 135–145)
TROPONIN T, HIGH SENSITIVITY RESULT: 14 NG/L — SIGNIFICANT CHANGE UP (ref 0–51)
WBC # BLD: 6.64 K/UL — SIGNIFICANT CHANGE UP (ref 3.8–10.5)
WBC # FLD AUTO: 6.64 K/UL — SIGNIFICANT CHANGE UP (ref 3.8–10.5)

## 2024-03-03 PROCEDURE — 71045 X-RAY EXAM CHEST 1 VIEW: CPT | Mod: 26

## 2024-03-03 PROCEDURE — 70486 CT MAXILLOFACIAL W/O DYE: CPT | Mod: 26,MC

## 2024-03-03 PROCEDURE — 99285 EMERGENCY DEPT VISIT HI MDM: CPT

## 2024-03-03 PROCEDURE — 70450 CT HEAD/BRAIN W/O DYE: CPT | Mod: 26,MC

## 2024-03-03 RX ORDER — SODIUM CHLORIDE 9 MG/ML
1000 INJECTION, SOLUTION INTRAVENOUS ONCE
Refills: 0 | Status: COMPLETED | OUTPATIENT
Start: 2024-03-03 | End: 2024-03-03

## 2024-03-03 RX ADMIN — SODIUM CHLORIDE 1000 MILLILITER(S): 9 INJECTION, SOLUTION INTRAVENOUS at 22:00

## 2024-03-03 NOTE — ED ADULT TRIAGE NOTE - CHIEF COMPLAINT QUOTE
Pt states, "I fell in front of my apartment." States hit the R side of face and now c/o pain to area.

## 2024-03-03 NOTE — ED ADULT NURSE NOTE - OBJECTIVE STATEMENT
Pt aaox4, pt c/o R sided HA s/p fall. Pt states, "I walked up the steps to my apartment and then I called the ambulance, I might've fallen but I don't remember exactly". Pt denies LOC, HS, AC use. Pt w bruising to R eye and mild bruising to L eye in various stages of healing. Per EMS, pt endorsed having one alcoholic drink, pt denies ETOH use upon assessment. Pt denies drug use, cp, sob, n/v/d, fevers/chills, weakness/tingling.

## 2024-03-03 NOTE — ED ADULT NURSE NOTE - NSFALLRISKINTERV_ED_ALL_ED

## 2024-03-04 ENCOUNTER — INPATIENT (INPATIENT)
Facility: HOSPITAL | Age: 59
LOS: 0 days | Discharge: HOME | DRG: 100 | End: 2024-03-05
Attending: STUDENT IN AN ORGANIZED HEALTH CARE EDUCATION/TRAINING PROGRAM | Admitting: INTERNAL MEDICINE
Payer: COMMERCIAL

## 2024-03-04 ENCOUNTER — RESULT REVIEW (OUTPATIENT)
Age: 59
End: 2024-03-04

## 2024-03-04 DIAGNOSIS — Z29.9 ENCOUNTER FOR PROPHYLACTIC MEASURES, UNSPECIFIED: ICD-10-CM

## 2024-03-04 DIAGNOSIS — E87.6 HYPOKALEMIA: ICD-10-CM

## 2024-03-04 DIAGNOSIS — E87.1 HYPO-OSMOLALITY AND HYPONATREMIA: ICD-10-CM

## 2024-03-04 DIAGNOSIS — K08.9 DISORDER OF TEETH AND SUPPORTING STRUCTURES, UNSPECIFIED: ICD-10-CM

## 2024-03-04 DIAGNOSIS — R55 SYNCOPE AND COLLAPSE: ICD-10-CM

## 2024-03-04 DIAGNOSIS — E83.39 OTHER DISORDERS OF PHOSPHORUS METABOLISM: ICD-10-CM

## 2024-03-04 DIAGNOSIS — I10 ESSENTIAL (PRIMARY) HYPERTENSION: ICD-10-CM

## 2024-03-04 DIAGNOSIS — E83.42 HYPOMAGNESEMIA: ICD-10-CM

## 2024-03-04 DIAGNOSIS — F10.10 ALCOHOL ABUSE, UNCOMPLICATED: ICD-10-CM
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
## 2024-03-04 DIAGNOSIS — E87.20 ACIDOSIS, UNSPECIFIED: ICD-10-CM

## 2024-03-04 DIAGNOSIS — R56.9 UNSPECIFIED CONVULSIONS: ICD-10-CM

## 2024-03-04 LAB
A1C WITH ESTIMATED AVERAGE GLUCOSE RESULT: 5.3 % — SIGNIFICANT CHANGE UP (ref 4–5.6)
ADD ON TEST-SPECIMEN IN LAB: SIGNIFICANT CHANGE UP
ADD ON TEST-SPECIMEN IN LAB: SIGNIFICANT CHANGE UP
ALBUMIN SERPL ELPH-MCNC: 3.6 G/DL — SIGNIFICANT CHANGE UP (ref 3.3–5)
ALBUMIN SERPL ELPH-MCNC: 4.3 G/DL — SIGNIFICANT CHANGE UP (ref 3.3–5)
ALP SERPL-CCNC: 61 U/L — SIGNIFICANT CHANGE UP (ref 40–120)
ALP SERPL-CCNC: 74 U/L — SIGNIFICANT CHANGE UP (ref 40–120)
ALT FLD-CCNC: 13 U/L — SIGNIFICANT CHANGE UP (ref 10–45)
ALT FLD-CCNC: 16 U/L — SIGNIFICANT CHANGE UP (ref 10–45)
ANION GAP SERPL CALC-SCNC: 12 MMOL/L — SIGNIFICANT CHANGE UP (ref 5–17)
ANION GAP SERPL CALC-SCNC: 14 MMOL/L — SIGNIFICANT CHANGE UP (ref 5–17)
ANION GAP SERPL CALC-SCNC: 17 MMOL/L — SIGNIFICANT CHANGE UP (ref 5–17)
ANION GAP SERPL CALC-SCNC: 21 MMOL/L — HIGH (ref 5–17)
AST SERPL-CCNC: 18 U/L — SIGNIFICANT CHANGE UP (ref 10–40)
AST SERPL-CCNC: 22 U/L — SIGNIFICANT CHANGE UP (ref 10–40)
BASOPHILS # BLD AUTO: 0.03 K/UL — SIGNIFICANT CHANGE UP (ref 0–0.2)
BASOPHILS NFR BLD AUTO: 0.6 % — SIGNIFICANT CHANGE UP (ref 0–2)
BILIRUB SERPL-MCNC: 0.3 MG/DL — SIGNIFICANT CHANGE UP (ref 0.2–1.2)
BILIRUB SERPL-MCNC: 0.4 MG/DL — SIGNIFICANT CHANGE UP (ref 0.2–1.2)
BLD GP AB SCN SERPL QL: NEGATIVE — SIGNIFICANT CHANGE UP
BUN SERPL-MCNC: 19 MG/DL — SIGNIFICANT CHANGE UP (ref 7–23)
BUN SERPL-MCNC: 19 MG/DL — SIGNIFICANT CHANGE UP (ref 7–23)
BUN SERPL-MCNC: 20 MG/DL — SIGNIFICANT CHANGE UP (ref 7–23)
BUN SERPL-MCNC: 23 MG/DL — SIGNIFICANT CHANGE UP (ref 7–23)
CALCIUM SERPL-MCNC: 9.3 MG/DL — SIGNIFICANT CHANGE UP (ref 8.4–10.5)
CALCIUM SERPL-MCNC: 9.4 MG/DL — SIGNIFICANT CHANGE UP (ref 8.4–10.5)
CHLORIDE SERPL-SCNC: 85 MMOL/L — LOW (ref 96–108)
CHLORIDE SERPL-SCNC: 89 MMOL/L — LOW (ref 96–108)
CHLORIDE SERPL-SCNC: 92 MMOL/L — LOW (ref 96–108)
CHLORIDE SERPL-SCNC: 95 MMOL/L — LOW (ref 96–108)
CK SERPL-CCNC: 44 U/L — SIGNIFICANT CHANGE UP (ref 25–170)
CO2 SERPL-SCNC: 20 MMOL/L — LOW (ref 22–31)
CO2 SERPL-SCNC: 20 MMOL/L — LOW (ref 22–31)
CO2 SERPL-SCNC: 21 MMOL/L — LOW (ref 22–31)
CO2 SERPL-SCNC: 21 MMOL/L — LOW (ref 22–31)
CREAT SERPL-MCNC: 0.59 MG/DL — SIGNIFICANT CHANGE UP (ref 0.5–1.3)
CREAT SERPL-MCNC: 0.65 MG/DL — SIGNIFICANT CHANGE UP (ref 0.5–1.3)
CREAT SERPL-MCNC: 0.66 MG/DL — SIGNIFICANT CHANGE UP (ref 0.5–1.3)
CREAT SERPL-MCNC: 0.76 MG/DL — SIGNIFICANT CHANGE UP (ref 0.5–1.3)
EGFR: 102 ML/MIN/1.73M2 — SIGNIFICANT CHANGE UP
EGFR: 102 ML/MIN/1.73M2 — SIGNIFICANT CHANGE UP
EGFR: 104 ML/MIN/1.73M2 — SIGNIFICANT CHANGE UP
EGFR: 91 ML/MIN/1.73M2 — SIGNIFICANT CHANGE UP
EOSINOPHIL # BLD AUTO: 0.03 K/UL — SIGNIFICANT CHANGE UP (ref 0–0.5)
EOSINOPHIL NFR BLD AUTO: 0.6 % — SIGNIFICANT CHANGE UP (ref 0–6)
ESTIMATED AVERAGE GLUCOSE: 105 MG/DL — SIGNIFICANT CHANGE UP (ref 68–114)
GLUCOSE SERPL-MCNC: 161 MG/DL — HIGH (ref 70–99)
GLUCOSE SERPL-MCNC: 162 MG/DL — HIGH (ref 70–99)
GLUCOSE SERPL-MCNC: 166 MG/DL — HIGH (ref 70–99)
GLUCOSE SERPL-MCNC: 167 MG/DL — HIGH (ref 70–99)
HCT VFR BLD CALC: 25.7 % — LOW (ref 34.5–45)
HGB BLD-MCNC: 9.2 G/DL — LOW (ref 11.5–15.5)
IMM GRANULOCYTES NFR BLD AUTO: 1.4 % — HIGH (ref 0–0.9)
LYMPHOCYTES # BLD AUTO: 0.79 K/UL — LOW (ref 1–3.3)
LYMPHOCYTES # BLD AUTO: 15.5 % — SIGNIFICANT CHANGE UP (ref 13–44)
MAGNESIUM SERPL-MCNC: 1.4 MG/DL — LOW (ref 1.6–2.6)
MAGNESIUM SERPL-MCNC: 1.4 MG/DL — LOW (ref 1.6–2.6)
MAGNESIUM SERPL-MCNC: 1.6 MG/DL — SIGNIFICANT CHANGE UP (ref 1.6–2.6)
MAGNESIUM SERPL-MCNC: 1.7 MG/DL — SIGNIFICANT CHANGE UP (ref 1.6–2.6)
MAGNESIUM SERPL-MCNC: 1.8 MG/DL — SIGNIFICANT CHANGE UP (ref 1.6–2.6)
MCHC RBC-ENTMCNC: 33.5 PG — SIGNIFICANT CHANGE UP (ref 27–34)
MCHC RBC-ENTMCNC: 35.8 GM/DL — SIGNIFICANT CHANGE UP (ref 32–36)
MCV RBC AUTO: 93.5 FL — SIGNIFICANT CHANGE UP (ref 80–100)
MONOCYTES # BLD AUTO: 0.89 K/UL — SIGNIFICANT CHANGE UP (ref 0–0.9)
MONOCYTES NFR BLD AUTO: 17.5 % — HIGH (ref 2–14)
NEUTROPHILS # BLD AUTO: 3.29 K/UL — SIGNIFICANT CHANGE UP (ref 1.8–7.4)
NEUTROPHILS NFR BLD AUTO: 64.4 % — SIGNIFICANT CHANGE UP (ref 43–77)
NRBC # BLD: 0 /100 WBCS — SIGNIFICANT CHANGE UP (ref 0–0)
OSMOLALITY SERPL: 269 MOSM/KG — LOW (ref 275–300)
PHOSPHATE SERPL-MCNC: 0.8 MG/DL — CRITICAL LOW (ref 2.5–4.5)
PHOSPHATE SERPL-MCNC: 1.4 MG/DL — LOW (ref 2.5–4.5)
PHOSPHATE SERPL-MCNC: 1.8 MG/DL — LOW (ref 2.5–4.5)
PHOSPHATE SERPL-MCNC: 2.9 MG/DL — SIGNIFICANT CHANGE UP (ref 2.5–4.5)
PLATELET # BLD AUTO: 190 K/UL — SIGNIFICANT CHANGE UP (ref 150–400)
POTASSIUM SERPL-MCNC: 2.8 MMOL/L — CRITICAL LOW (ref 3.5–5.3)
POTASSIUM SERPL-MCNC: 3.3 MMOL/L — LOW (ref 3.5–5.3)
POTASSIUM SERPL-MCNC: 3.6 MMOL/L — SIGNIFICANT CHANGE UP (ref 3.5–5.3)
POTASSIUM SERPL-MCNC: 4 MMOL/L — SIGNIFICANT CHANGE UP (ref 3.5–5.3)
POTASSIUM SERPL-SCNC: 2.8 MMOL/L — CRITICAL LOW (ref 3.5–5.3)
POTASSIUM SERPL-SCNC: 3.3 MMOL/L — LOW (ref 3.5–5.3)
POTASSIUM SERPL-SCNC: 3.6 MMOL/L — SIGNIFICANT CHANGE UP (ref 3.5–5.3)
POTASSIUM SERPL-SCNC: 4 MMOL/L — SIGNIFICANT CHANGE UP (ref 3.5–5.3)
PROT SERPL-MCNC: 5.5 G/DL — LOW (ref 6–8.3)
PROT SERPL-MCNC: 6.7 G/DL — SIGNIFICANT CHANGE UP (ref 6–8.3)
RBC # BLD: 2.75 M/UL — LOW (ref 3.8–5.2)
RBC # FLD: 13 % — SIGNIFICANT CHANGE UP (ref 10.3–14.5)
RH IG SCN BLD-IMP: POSITIVE — SIGNIFICANT CHANGE UP
SODIUM SERPL-SCNC: 126 MMOL/L — LOW (ref 135–145)
SODIUM SERPL-SCNC: 126 MMOL/L — LOW (ref 135–145)
SODIUM SERPL-SCNC: 127 MMOL/L — LOW (ref 135–145)
SODIUM SERPL-SCNC: 128 MMOL/L — LOW (ref 135–145)
TSH SERPL-MCNC: 0.46 UIU/ML — SIGNIFICANT CHANGE UP (ref 0.27–4.2)
WBC # BLD: 5.1 K/UL — SIGNIFICANT CHANGE UP (ref 3.8–10.5)
WBC # FLD AUTO: 5.1 K/UL — SIGNIFICANT CHANGE UP (ref 3.8–10.5)

## 2024-03-04 PROCEDURE — 99223 1ST HOSP IP/OBS HIGH 75: CPT | Mod: GC

## 2024-03-04 PROCEDURE — 95718 EEG PHYS/QHP 2-12 HR W/VEEG: CPT

## 2024-03-04 PROCEDURE — 93010 ELECTROCARDIOGRAM REPORT: CPT

## 2024-03-04 PROCEDURE — 93306 TTE W/DOPPLER COMPLETE: CPT | Mod: 26

## 2024-03-04 RX ORDER — SODIUM CHLORIDE 9 MG/ML
1000 INJECTION INTRAMUSCULAR; INTRAVENOUS; SUBCUTANEOUS
Refills: 0 | Status: DISCONTINUED | OUTPATIENT
Start: 2024-03-04 | End: 2024-03-05

## 2024-03-04 RX ORDER — BENZOCAINE 10 %
1 GEL (GRAM) MUCOUS MEMBRANE EVERY 6 HOURS
Refills: 0 | Status: DISCONTINUED | OUTPATIENT
Start: 2024-03-04 | End: 2024-03-05

## 2024-03-04 RX ORDER — MAGNESIUM SULFATE 500 MG/ML
2 VIAL (ML) INJECTION ONCE
Refills: 0 | Status: COMPLETED | OUTPATIENT
Start: 2024-03-04 | End: 2024-03-04

## 2024-03-04 RX ORDER — POTASSIUM CHLORIDE 20 MEQ
40 PACKET (EA) ORAL ONCE
Refills: 0 | Status: COMPLETED | OUTPATIENT
Start: 2024-03-04 | End: 2024-03-04

## 2024-03-04 RX ORDER — THIAMINE MONONITRATE (VIT B1) 100 MG
200 TABLET ORAL EVERY 24 HOURS
Refills: 0 | Status: DISCONTINUED | OUTPATIENT
Start: 2024-03-04 | End: 2024-03-04

## 2024-03-04 RX ORDER — LEVETIRACETAM 250 MG/1
500 TABLET, FILM COATED ORAL EVERY 12 HOURS
Refills: 0 | Status: DISCONTINUED | OUTPATIENT
Start: 2024-03-04 | End: 2024-03-04

## 2024-03-04 RX ORDER — LEVETIRACETAM 250 MG/1
500 TABLET, FILM COATED ORAL EVERY 12 HOURS
Refills: 0 | Status: DISCONTINUED | OUTPATIENT
Start: 2024-03-04 | End: 2024-03-05

## 2024-03-04 RX ORDER — SODIUM,POTASSIUM PHOSPHATES 278-250MG
1 POWDER IN PACKET (EA) ORAL ONCE
Refills: 0 | Status: COMPLETED | OUTPATIENT
Start: 2024-03-04 | End: 2024-03-04

## 2024-03-04 RX ORDER — POTASSIUM PHOSPHATE, MONOBASIC POTASSIUM PHOSPHATE, DIBASIC 236; 224 MG/ML; MG/ML
30 INJECTION, SOLUTION INTRAVENOUS ONCE
Refills: 0 | Status: COMPLETED | OUTPATIENT
Start: 2024-03-04 | End: 2024-03-04

## 2024-03-04 RX ORDER — POTASSIUM PHOSPHATE, MONOBASIC POTASSIUM PHOSPHATE, DIBASIC 236; 224 MG/ML; MG/ML
30 INJECTION, SOLUTION INTRAVENOUS ONCE
Refills: 0 | Status: DISCONTINUED | OUTPATIENT
Start: 2024-03-04 | End: 2024-03-04

## 2024-03-04 RX ORDER — FOLIC ACID 0.8 MG
1 TABLET ORAL EVERY 24 HOURS
Refills: 0 | Status: DISCONTINUED | OUTPATIENT
Start: 2024-03-04 | End: 2024-03-05

## 2024-03-04 RX ORDER — POTASSIUM CHLORIDE 20 MEQ
40 PACKET (EA) ORAL ONCE
Refills: 0 | Status: DISCONTINUED | OUTPATIENT
Start: 2024-03-04 | End: 2024-03-04

## 2024-03-04 RX ORDER — THIAMINE MONONITRATE (VIT B1) 100 MG
500 TABLET ORAL EVERY 8 HOURS
Refills: 0 | Status: DISCONTINUED | OUTPATIENT
Start: 2024-03-04 | End: 2024-03-05

## 2024-03-04 RX ORDER — ENOXAPARIN SODIUM 100 MG/ML
40 INJECTION SUBCUTANEOUS EVERY 24 HOURS
Refills: 0 | Status: DISCONTINUED | OUTPATIENT
Start: 2024-03-04 | End: 2024-03-05

## 2024-03-04 RX ORDER — HYDRALAZINE HCL 50 MG
1 TABLET ORAL
Qty: 0 | Refills: 0 | DISCHARGE

## 2024-03-04 RX ORDER — IBUPROFEN 200 MG
400 TABLET ORAL ONCE
Refills: 0 | Status: COMPLETED | OUTPATIENT
Start: 2024-03-04 | End: 2024-03-04

## 2024-03-04 RX ORDER — POTASSIUM CHLORIDE 20 MEQ
10 PACKET (EA) ORAL ONCE
Refills: 0 | Status: COMPLETED | OUTPATIENT
Start: 2024-03-04 | End: 2024-03-04

## 2024-03-04 RX ORDER — INFLUENZA VIRUS VACCINE 15; 15; 15; 15 UG/.5ML; UG/.5ML; UG/.5ML; UG/.5ML
0.5 SUSPENSION INTRAMUSCULAR ONCE
Refills: 0 | Status: DISCONTINUED | OUTPATIENT
Start: 2024-03-04 | End: 2024-03-05

## 2024-03-04 RX ADMIN — Medication 25 GRAM(S): at 01:44

## 2024-03-04 RX ADMIN — SODIUM CHLORIDE 100 MILLILITER(S): 9 INJECTION INTRAMUSCULAR; INTRAVENOUS; SUBCUTANEOUS at 17:46

## 2024-03-04 RX ADMIN — POTASSIUM PHOSPHATE, MONOBASIC POTASSIUM PHOSPHATE, DIBASIC 83.33 MILLIMOLE(S): 236; 224 INJECTION, SOLUTION INTRAVENOUS at 14:45

## 2024-03-04 RX ADMIN — LEVETIRACETAM 500 MILLIGRAM(S): 250 TABLET, FILM COATED ORAL at 06:07

## 2024-03-04 RX ADMIN — Medication 40 MILLIEQUIVALENT(S): at 00:57

## 2024-03-04 RX ADMIN — POTASSIUM PHOSPHATE, MONOBASIC POTASSIUM PHOSPHATE, DIBASIC 83.33 MILLIMOLE(S): 236; 224 INJECTION, SOLUTION INTRAVENOUS at 05:23

## 2024-03-04 RX ADMIN — Medication 105 MILLIGRAM(S): at 21:22

## 2024-03-04 RX ADMIN — Medication 400 MILLIGRAM(S): at 01:49

## 2024-03-04 RX ADMIN — Medication 100 MILLIEQUIVALENT(S): at 00:57

## 2024-03-04 RX ADMIN — SODIUM CHLORIDE 100 MILLILITER(S): 9 INJECTION INTRAMUSCULAR; INTRAVENOUS; SUBCUTANEOUS at 06:08

## 2024-03-04 RX ADMIN — Medication 200 MILLIGRAM(S): at 06:09

## 2024-03-04 RX ADMIN — Medication 1 PACKET(S): at 05:12

## 2024-03-04 RX ADMIN — Medication 105 MILLIGRAM(S): at 10:42

## 2024-03-04 RX ADMIN — ENOXAPARIN SODIUM 40 MILLIGRAM(S): 100 INJECTION SUBCUTANEOUS at 06:07

## 2024-03-04 RX ADMIN — Medication 1 MILLIGRAM(S): at 06:27

## 2024-03-04 RX ADMIN — LEVETIRACETAM 500 MILLIGRAM(S): 250 TABLET, FILM COATED ORAL at 18:00

## 2024-03-04 NOTE — ED PROVIDER NOTE - PHYSICAL EXAMINATION
CONST: nontoxic NAD disheveled speaking in full sentences  HEAD: atraumatic no hematoma or laceration  EYES: conjunctivae clear, healing ecchymosis below eyes  ENMT: poor dentition, dry mouth  NECK: supple  CARD: regular rate  CHEST: breathing comfortably, no stridor/retractions/tripoding, clear BS  ABD: Soft nontender  EXT: FROM  SKIN: warm, dry  NEURO: awake alert answering questions following commands moving all extremities

## 2024-03-04 NOTE — H&P ADULT - PROBLEM SELECTOR PLAN 3
Phos 1.8 on admission --> 0.8.  - potassium phos 30 mmol x1  - phos-Na-K powder packet x1  - f/u repeat EKG  - trend BMP, Mg, phos K 2.8 on admission. S/p klor 40 mEq PO x1 and 10mEq IV x1 in ED. Repeat K 4.0. EKG with NSR w/ new Q waves in II, III, aVF (compared w/ 09/2023), QTc 456. Troponin T negative on admission, no CP.  - trend BMP, Mg, phos  - replete as indicated  - f/u repeat EKG

## 2024-03-04 NOTE — ED PROVIDER NOTE - WET READ LAUNCH FT
There are no Wet Read(s) to document. Niacinamide Pregnancy And Lactation Text: These medications are considered safe during pregnancy.

## 2024-03-04 NOTE — H&P ADULT - PROBLEM SELECTOR PLAN 5
Mg 1.4 on admission. S/p Mg 2g IV x1 in ED.  - trend BMP, Mg, phos  - replete as indicated Na 125 on admission --> 126. S/p LR 1L x1 in ED. Appears dry on exam. Likely hypovolemic hyponatremia i/s/o poor PO intake. Improved from 125 to 126 after 1L of LR in ED.  - maintenance IVF with NS at 100 cc/hr  - f/u serum osm  - f/u urine Na and urine osm  - trend BMP, Mg, phos  - restart diet once passes dysphagia screen

## 2024-03-04 NOTE — ED PROVIDER NOTE - OBJECTIVE STATEMENT
58-year-old female with history HTN, alcohol abuse, comes in for evaluation after fall.  Patient says she was outside of her apartment and she felt dizzy and fell, unknown sure if she passed out.  Denies LOC, headache, AC use.  Was able to get up and ambulate afterwards.  Patient says she did not drink alcohol today.

## 2024-03-04 NOTE — H&P ADULT - PROBLEM SELECTOR PLAN 9
Unclear if pt is on home meds for HTN as she cannot recall, no antihypertensive meds in surescripts. In 09/2023 pt was discharged on clonidine 0.1mg BID and hydral 25mg QID.  - formal med rec in AM  - obtain collateral from PCP Hx of alcohol use disorder, admitted 9/5-9/7/23 to MICU for seizures i/s/o alcohol withdrawal and noncompliance w/ keppra. Blood alcohol negative on admission. CIWA 0 on admission.  - thiamine 200mg IV qd x 5 days, followed by 100mg PO qd  - folate 1mg IV qd --> transition to 1mg PO qd on discharge  - f/u utox  - CIWA protocol

## 2024-03-04 NOTE — H&P ADULT - PROBLEM SELECTOR PLAN 11
F: None   E: Replete as necessary K>4 Mg>2  N: DASH diet   DVT Prophylaxis: Lovenox 40mg SQ qd  GI prophylaxis: None   CODE STATUS: FULL CODE  Dispo: [facility]

## 2024-03-04 NOTE — H&P ADULT - PROBLEM SELECTOR PLAN 10
F: None   E: Replete as necessary K>4 Mg>2  N: DASH diet   DVT Prophylaxis: Lovenox 40mg SQ qd  GI prophylaxis: None   CODE STATUS: FULL CODE  Dispo: [facility] Unclear if pt is on home meds for HTN as she cannot recall, no antihypertensive meds in surescripts. In 09/2023 pt was discharged on clonidine 0.1mg BID and hydral 25mg QID.  - formal med rec in AM  - obtain collateral from PCP

## 2024-03-04 NOTE — H&P ADULT - ATTENDING COMMENTS
pt presented with LOC and fall admitted for acute metabolic encephalopathy 2/2 suspected seizure vs hyponatremia vs alcohol intoxication   pt is non compliant with keppra - took VEEG off during this admission   keppra levels were not sent on admission   pt is agreeable for VEEG now - will fu 24 hour reports   restarted on keppra after extensive conversation regarding taking meds as prescribed and avoiding alcohol use while being on this medication   pt will be started on ciwa protocol - with mv , fa, thiamine   hyponatremic on admission Na 125  fu urine lytes - serum osm cw hypovolemic hyponatremia.   na 127   AGMA - suspect starvation ketosis- lactate was not sent on admission - AG closed - bicarb imprved to 21 resolving after IVF  TTE negative   hypokalemia/ hypophosphatemia: will replete labs abnormalities and repeat labs -- monitor for re-feeding syn  dvt pp x qd  lovenox

## 2024-03-04 NOTE — PATIENT PROFILE ADULT - FALL HARM RISK - HARM RISK INTERVENTIONS
Assistance OOB with selected safe patient handling equipment/Communicate Risk of Fall with Harm to all staff/Reinforce activity limits and safety measures with patient and family/Tailored Fall Risk Interventions/Visual Cue: Yellow wristband and red socks/Bed in lowest position, wheels locked, appropriate side rails in place/Call bell, personal items and telephone in reach/Instruct patient to call for assistance before getting out of bed or chair/Non-slip footwear when patient is out of bed/[place] to call system/Physically safe environment - no spills, clutter or unnecessary equipment/Purposeful Proactive Rounding/Room/bathroom lighting operational, light cord in reach Assistance OOB with selected safe patient handling equipment/Communicate Risk of Fall with Harm to all staff/Discuss with provider need for PT consult/Monitor gait and stability/Provide patient with walking aids - walker, cane, crutches/Reinforce activity limits and safety measures with patient and family/Tailored Fall Risk Interventions/Visual Cue: Yellow wristband and red socks/Bed in lowest position, wheels locked, appropriate side rails in place/Call bell, personal items and telephone in reach/Instruct patient to call for assistance before getting out of bed or chair/Non-slip footwear when patient is out of bed/[place] to call system/Physically safe environment - no spills, clutter or unnecessary equipment/Purposeful Proactive Rounding/Room/bathroom lighting operational, light cord in reach

## 2024-03-04 NOTE — PATIENT PROFILE ADULT - NSTRANSFEREYEGLASSESPAIRS_GEN_A_NUR
Mercedes Flap Text: The defect edges were debeveled with a #15 scalpel blade.  Given the location of the defect, shape of the defect and the proximity to free margins a Mercedes flap was deemed most appropriate.  Using a sterile surgical marker, an appropriate advancement flap was drawn incorporating the defect and placing the expected incisions within the relaxed skin tension lines where possible. The area thus outlined was incised deep to adipose tissue with a #15 scalpel blade.  The skin margins were undermined to an appropriate distance in all directions utilizing iris scissors. 1 pair

## 2024-03-04 NOTE — H&P ADULT - PROBLEM SELECTOR PLAN 6
Reportedly takes keppra but cannot recall dosing or frequency, admits to occasional missed doses, last prescription in [facility] is from 08/2023. Admitted 9/5-9/7/23 to MICU for seizures i/s/o alcohol withdrawal, was noncompliant w/ keppra per chart review, was discharged w/ keppra 500mg BID.  - c/w keppra 500mg PO BID  - f/u EEG  - obtain collateral from PCP/neurologist Mg 1.4 on admission. S/p Mg 2g IV x1 in ED.  - trend BMP, Mg, phos  - replete as indicated

## 2024-03-04 NOTE — H&P ADULT - PROBLEM SELECTOR PLAN 8
F: None   E: Replete as necessary K>4 Mg>2  N: DASH diet   DVT Prophylaxis: Lovenox 40mg SQ qd  GI prophylaxis: None   CODE STATUS: FULL CODE  Dispo: [facility] Hx of alcohol use disorder, admitted 9/5-9/7/23 to MICU for seizures i/s/o alcohol withdrawal and noncompliance w/ keppra. Blood alcohol negative on admission.  - thiamine 200mg IV qd x 5 days, followed by 100mg PO qd  - folate 1mg IV qd --> transition to 1mg PO qd on discharge  - f/u utox Missing teeth with exposed screws. Reportedly follows w/ dentist regularly but cannot recall their name. Reports pain as her tongue scrapes against the exposed screws. S/p ibuprofen 400mg PO x1 in ED.  - tylenol 650mg PO q6h PRN for pain  - benzocaine 20% spray q6h PRN for oral pain  - dysphagia screen  - outpatient f/u with dentist

## 2024-03-04 NOTE — H&P ADULT - NSICDXPASTMEDICALHX_GEN_ALL_CORE_FT
DELIVERY ATTENDANCE NOTE     Asked by Dr. NIRMALA Goel to attend this vaginal delivery at Gestational Age: 40w1d due to meconium-stained fluid.    Prenatal labs include:  Information for the patient's mother:  Amber Ruiz \"Amber Oliveraol\" [[id number]]     HEP B Surface AG (no units)   Date Value   2019 NEGATIVE     HIV Antigen/Antibody Screen (no units)   Date Value   2019 NONREACTIVE     CULTURE (no units)   Date Value   2019     NEGATIVE FOR STREPTOCOCCUS AGALACTIAE (STREP GROUP B)     ABO/RH(D) (no units)   Date Value   2019 A POSITIVE     RPR Screen (no units)   Date Value   2019 NON-REACTIVE     Neisseria Gonorrhoeae by Nucleic Acid Amplification (no units)   Date Value   2019 NEGATIVE     Rubella Antibody IgG (Units/mL)   Date Value   2019 283.9     Chlamydia Trachomatis by Nucleic Acid Amplification (no units)   Date Value   2019 NEGATIVE   .  Herpes: Unknown    Pregnancy complications: none   Maternal history includes:   Information for the patient's mother:  Amber Ruiz \"Amber Ruiz\" [[id number]]   24 year old   white female  Information for the patient's mother:  Amber Ruiz \"Amber Ruiz\" [[id number]]       Information for the patient's mother:  Amber Ruiz \"Amber Oliveraol\" [[id number]]   History reviewed. No pertinent past medical history.    Information for the patient's mother:  Amber Ruiz \"Amber Oliveraol\" [[id number]]     Social History     Tobacco Use   • Smoking status: Never Smoker   • Smokeless tobacco: Never Used   Substance Use Topics   • Alcohol use: Never     Frequency: Never   • Drug use: Never     Information for the patient's mother:  Amber Ruiz \"Amber Oliveraol\" [[id number]]     Medications Prior to Admission   Medication Sig Dispense Refill   • Cholecalciferol (VITAMIN D) 2000 units capsule Take 1 Units by mouth.     • Prenatal MV-Min-Fe Fum-FA-DHA (PRENATAL 1 PO)        No antibiotic(s) indicated prior to delivery due to negative maternal GBS  status.    Labor  Delivery Method:  Vaginal  Rupture Date: 2019  Rupture Time: 5:51 AM  Time of Birth: 10:07 AM    Birth  Baby cried upon delivery. Bulb suctioned. To mother's chest immediately; baby crying and active; baby was dried. To warmer at around 5 min of age. Continued drying baby. Centrally pink in room air at 5 min of age. Physical assessment completed. Active and vigorous baby at 5 min of age. Spoke with both parents in the LDR.     APGARS One minute Five minutes Ten minutes   Skin color 0 1    rateHeart rate 2 2    Grimace 2 2    Muscle tone 2 2    Breathing 2 2    Totals 8 9       Weight: 6 lb 3.5 oz (2820 g)    Length: 18.9\"    Head circumference: 35.5 cm     PHYSICAL EXAM at 5 min     GENERAL:  Morphologically normal, alert, vigorous female in no acute distress.  SKIN: Skin pink and warm.  No rashes or lesions.  HEAD: Normocephalic. Anterior fontanel soft and flat.  Sutures mobile.  EYES: Both eyes present. Red reflexes exam deferred at delivery.  EARS: Pinnae normal. Normal shape and placement.  NOSE: Nares patent bilaterally.  THROAT:  Intact palate.  NECK: Supple without masses. Clavicles intact bilaterally.  CHEST: Breath sounds bilaterally equal and clear. Unlabored respirations. No grunting, nasal flaring or retracting.  CARDIOVASULAR: HRR regular without murmur. Quiet precordium. Pulses normal.  ABDOMEN: Abdomen soft and flat. No masses. No organomegaly. 3 vessel cord.  GENITALIA: normal female genitalia.  RECTAL: anus patent  EXTREMITIES: Moving all 4 extremities symmetrically. Hip exam deferred at delivery.    NEUROLOGIC: Normal tone and reflexes for gestational age.     ASSESSMENT: Stable term AGA female .     PLAN:  Anticipate routine  care. To room-in with mother.     FREEMAN Luis,NNP-BC             PAST MEDICAL HISTORY:  Epilepsy     ETOH abuse     HTN (hypertension)

## 2024-03-04 NOTE — EEG REPORT - NS EEG TEXT BOX
[facility] Department of Neurology  Inpatient Continuous video-Electroencephalogram  [address], [address] [place] [address], T: [phone]    Patient Name:	MELODY MILLS    :	1965  MRN:	[id number]    Study Start Date/Time:	3/4/2024, 3:45:47 PM  Study End Date/Time:	3/4/2024, 6:30 PM    Referred by:  Dr. Odessa Alfonso    Brief Clinical History:  MELODY MILLS is a 58 year old Female alcohol use disorder, seizures, presenting for LOC and encephalopathy; study performed to investigate for seizures or markers of epilepsy.   Technologist notes: -  Diagnosis Code:  R56.9 convulsions/seizure    Pertinent Medication:  n/a    Acquisition Details:  Electroencephalography was acquired using a minimum of 21 channels on an [employer] Neurology system v 9.3.1 with electrode placement according to the standard International 10-20 system following ACNS (American Clinical Neurophysiology Society) guidelines.  Anterior temporal T1 and T2 electrodes were utilized whenever possible.  The XLTEK automated spike & seizure detections were all reviewed in detail, in addition to the entire raw EEG.    Findings:  Background:  continuous, with predominantly alpha and beta frequencies.  Generalized Slowing:  None  Symmetry/Focality: No persistent asymmetries of voltage or frequency.     Voltage:  Normal (20+ uV)  Organization:  Appropriate anterior-posterior gradient  Posterior Dominant Rhythm:  8.5 Hz symmetric, well-organized, and well-modulated  Sleep:  Symmetric, synchronous spindles and K complexes.  Variability:   Yes		Reactivity:  Yes    Spontaneous Activity:  No epileptiform discharges   Events:  "	No electrographic seizures or significant clinical events occurred during this study.  Provocations:  "	Hyperventilation: was not performed.  "	Photic stimulation: was not performed.    FINAL Impression:  Normal Continuous 2-12Hr video-EEG  1)	No EEG abnormalities were identified during this recording.     Final Clinical Correlation:  1)	There were no findings of active epilepsy, however this alone does not rule out the diagnosis.      Brandt Johns MD   Attending Neurologist, [facility]       [facility] Department of Neurology  Inpatient Continuous video-Electroencephalogram  [address], [address] [place] [address], T: [phone]    Patient Name:	MELODY MILLS    :	1965  MRN:	[id number]    Study Start Date/Time:	3/4/2024, 3:45:47 PM  Study End Date/Time:	3/4/2024, 6:30 PM    Referred by:  Dr. Odessa Alfonso    Brief Clinical History:  MELODY MILLS is a 58 year old Female alcohol use disorder, seizures, presenting for LOC and encephalopathy; study performed to investigate for seizures or markers of epilepsy.   Technologist notes: -  Diagnosis Code:  R56.9 convulsions/seizure    Pertinent Medication:  n/a    Acquisition Details:  Electroencephalography was acquired using a minimum of 21 channels on an L[employer]N Neurology system v 9.3.1 with electrode placement according to the standard International 10-20 system following ACNS (American Clinical Neurophysiology Society) guidelines.  Anterior temporal T1 and T2 electrodes were utilized whenever possible.  The XLTEK automated spike & seizure detections were all reviewed in detail, in addition to the entire raw EEG.    Findings:  Background:  continuous, with predominantly alpha and beta frequencies.  Generalized Slowing:  None  Symmetry/Focality: No persistent asymmetries of voltage or frequency.     Voltage:  Normal (20+ uV)  Organization:  Appropriate anterior-posterior gradient  Posterior Dominant Rhythm:  8.5 Hz symmetric, well-organized, and well-modulated  Sleep:  Absent.  Variability:   Yes		Reactivity:  Yes    Spontaneous Activity:  No epileptiform discharges   Events:  "	No electrographic seizures or significant clinical events occurred during this study.  Provocations:  "	Hyperventilation: was not performed.  "	Photic stimulation: was not performed.    FINAL Impression:  Normal Continuous 2-12Hr video-EEG  1)	No EEG abnormalities were identified during this awake-only recording.     Final Clinical Correlation:  1)	There were no findings of active epilepsy, however this alone does not rule out the diagnosis.      Brandt Johns MD   Attending Neurologist, [facility]

## 2024-03-04 NOTE — H&P ADULT - PROBLEM SELECTOR PLAN 7
Missing teeth with exposed screws. Reportedly follows w/ dentist regularly but cannot recall their name. Reports pain as her tongue scrapes against the exposed screws.  - tylenol 650mg PO q6h PRN for pain  - benzocaine 20% spray q6h PRN for oral pain  - dysphagia screen  - outpatient f/u with dentist Reportedly takes keppra but cannot recall dosing or frequency, admits to occasional missed doses, last prescription in [facility] is from 08/2023. Admitted 9/5-9/7/23 to MICU for seizures i/s/o alcohol withdrawal, was noncompliant w/ keppra per chart review, was discharged w/ keppra 500mg BID.  - c/w keppra 500mg PO BID  - f/u vEEG  - confirm med rec in AM  - obtain collateral from PCP/neurologist

## 2024-03-04 NOTE — PATIENT PROFILE ADULT - FUNCTIONAL ASSESSMENT - BASIC MOBILITY SCORE.
Alert-The patient is alert, awake and responds to voice. The patient is oriented to time, place, and person. The triage nurse is able to obtain subjective information. 24

## 2024-03-04 NOTE — H&P ADULT - NSHPPHYSICALEXAM_GEN_ALL_CORE
.  VITAL SIGNS:  T(F): 98.1 (03-04-24 @ 01:27), Max: 98.1 (03-04-24 @ 01:27)  HR: 83 (03-04-24 @ 01:27) (83 - 102)  BP: 131/86 (03-04-24 @ 01:27) (108/78 - 131/86)  BP(mean): --  RR: 17 (03-04-24 @ 01:27) (17 - 17)  SpO2: 98% (03-04-24 @ 01:27) (98% - 100%)    PHYSICAL EXAM:    Constitutional: resting comfortably in bed; NAD  HEENT: NC/AT, EOMI, anicteric sclera, no nasal discharge; dry MM; missing teeth with exposed screws  Neck: supple  Respiratory: unlabored breathing, CTA B/L; no W/Rhonchi/Crackles  Cardiac: +S1/S2; RRR; no M/R/G  Gastrointestinal: soft, NT/ND; no rebound or guarding  Extremities: WWP, no clubbing or cyanosis; no peripheral edema  Musculoskeletal: NROM x4; no joint swelling, tenderness or erythema  Vascular: intact distal pulses  Neurologic: AAOx3; no focal deficits .  VITAL SIGNS:  T(F): 98.1 (03-04-24 @ 01:27), Max: 98.1 (03-04-24 @ 01:27)  HR: 83 (03-04-24 @ 01:27) (83 - 102)  BP: 131/86 (03-04-24 @ 01:27) (108/78 - 131/86)  BP(mean): --  RR: 17 (03-04-24 @ 01:27) (17 - 17)  SpO2: 98% (03-04-24 @ 01:27) (98% - 100%)    PHYSICAL EXAM:    Constitutional: resting comfortably in bed; NAD  HEENT: NC/AT, EOMI, anicteric sclera, no nasal discharge; dry MM; missing teeth with exposed screws; bruising below R eye  Neck: supple  Respiratory: unlabored breathing, CTA B/L; no W/Rhonchi/Crackles  Cardiac: +S1/S2; RRR; no M/R/G  Gastrointestinal: soft, NT/ND; no rebound or guarding  Extremities: WWP, no clubbing or cyanosis; no peripheral edema  Musculoskeletal: NROM x4; no joint swelling, tenderness or erythema  Vascular: intact distal pulses  Neurologic: AAOx3; no focal deficits

## 2024-03-04 NOTE — H&P ADULT - PROBLEM SELECTOR PLAN 2
K 2.8 on admission. S/p klor 40 mEq PO x1 and 10mEq IV x1 in ED. Repeat K 4.0. EKG with NSR w/ new Q waves in II, III, aVF (compared w/ 09/2023), QTc 456. Troponin T negative on admission, no CP.  - trend BMP, Mg, phos  - replete as indicated  - f/u repeat EKG On admission Na 125, K 2.8, Cl 84, bicarb 15, AG 26, BUN/Cr wnl. VBG obtained after IVF. HAGMA likely starvation ketosis i/s/o poor PO intake.  - resolving  - continue to monitor

## 2024-03-04 NOTE — H&P ADULT - HISTORY OF PRESENT ILLNESS
ED course:  Vitals: afebrile, , /78, RR 17, SpO2 100% on RA  Labs:   EKG:   Imaging:   Interventions:   Consults:    58F PMH HTN, alcohol use disorder, seizures, presenting for     ED course:  Vitals: afebrile, , /78, RR 17, SpO2 100% on RA  Labs: WBC/H&H/plt wnl, Na 125 --> 126, K 2.8, Cl 84 --> 85, bicarb 15 --> 20, AG 26 --> 21, BUN/Cr wnl, Mg 1.4, trops negative, blood alcohol negative. VBG pH 7.37, pCO2 35, pO2 30, bicarb 20.  EKG:   Imaging:   *CXR: clear, pending read  *CT head: No acute intracranial hemorrhage or calvarial fracture.  *CT maxillofacial: No evidence of maxillofacial or mandibular fracture.  Interventions: LR 1L x1, klor 40 mEq PO x1 and   Consults:  58F PMH HTN, alcohol use disorder, seizures, presenting for     ED course:  Vitals: afebrile, , /78, RR 17, SpO2 100% on RA  Labs: WBC/H&H/plt wnl, Na 125 --> 126, K 2.8, Cl 84 --> 85, bicarb 15 --> 20, AG 26 --> 21, BUN/Cr wnl, Mg 1.4, trops negative, blood alcohol negative. VBG pH 7.37, pCO2 35, pO2 30, bicarb 20.  EKG: NSR w/ new Q waves in II, III, aVF (compared w/ 09/2023), QTc 456  Imaging:   *CXR: clear, pending read  *CT head: No acute intracranial hemorrhage or calvarial fracture.  *CT maxillofacial: No evidence of maxillofacial or mandibular fracture.  Interventions: LR 1L x1, klor 40 mEq PO x1 and 10mEq IV x1, Mg 2g IV x1, ibuprofen 400mg PO x1 58F PMH HTN, alcohol use disorder, seizures, presenting for LOC and fall that occurred while she was alone at home. Pt reportedly lives w/ her partner but was alone when the episode occurred. Denies any prodromal symptoms, including palpitations, dizziness, lightheadedness, diaphoresis, etc. Reports chronic nausea that is unchanged from baseline. Denies fevers, chills, decreased appetite/decreased PO intake, CP, SOB, cough, abd pain, vomiting, diarrhea, constipation, dysuria, BLE edema. Pt is unsure if she hit her head, does not know how long she lost consciousness for, was found by her neighbor who called EMS. Her last seizure was a few months ago. Admits to occasional missed doses of her medications, does not remember the names of her meds besides keppra. Of note, pt is a poor historian. She reportedly follows regularly w/ a PCP and neurologist but does not recall the names of any of her providers.    ED course:  Vitals: afebrile, , /78, RR 17, SpO2 100% on RA  Labs: WBC/H&H/plt wnl, Na 125 --> 126, K 2.8, Cl 84 --> 85, bicarb 15 --> 20, AG 26 --> 21, BUN/Cr wnl, Mg 1.4, trops negative, blood alcohol negative. VBG pH 7.37, pCO2 35, pO2 30, bicarb 20.  EKG: NSR w/ new Q waves in II, III, aVF (compared w/ 09/2023), QTc 456  Imaging:   *CXR: clear, pending read  *CT head: No acute intracranial hemorrhage or calvarial fracture.  *CT maxillofacial: No evidence of maxillofacial or mandibular fracture.  Interventions: LR 1L x1, klor 40 mEq PO x1 and 10mEq IV x1, Mg 2g IV x1, ibuprofen 400mg PO x1 58F PMH HTN, alcohol use disorder, seizures, presenting for LOC and fall that occurred while she was alone at home. Pt reportedly lives w/ her partner but was alone when the episode occurred. Denies any prodromal symptoms, including palpitations, dizziness, lightheadedness, diaphoresis, etc. Reports chronic nausea that is unchanged from baseline. Denies fevers, chills, decreased appetite/decreased PO intake, CP, SOB, cough, abd pain, vomiting, diarrhea, constipation, dysuria, BLE edema. Pt is unsure if she hit her head, does not know how long she lost consciousness for, was found by her neighbor who called EMS. Her last seizure was a few months ago. Admits to occasional missed doses of her medications, does not remember the names of her meds besides keppra. Of note, pt is a poor historian. She reportedly follows regularly w/ a PCP and neurologist but does not recall the names of any of her providers.    ED course:  Vitals: afebrile, , /78, RR 17, SpO2 100% on RA  Labs: WBC/H&H/plt wnl, Na 125 --> 126, K 2.8, Cl 84 --> 85, bicarb 15 --> 20, AG 26 --> 21, BUN/Cr wnl, Mg 1.4, trops negative, blood alcohol negative. VBG pH 7.37, pCO2 35, pO2 30, bicarb 20.  EKG: NSR w/ no acute ischemic changes, QTc 456  Imaging:   *CXR: clear, pending read  *CT head: No acute intracranial hemorrhage or calvarial fracture.  *CT maxillofacial: No evidence of maxillofacial or mandibular fracture.  Interventions: LR 1L x1, klor 40 mEq PO x1 and 10mEq IV x1, Mg 2g IV x1, ibuprofen 400mg PO x1 58 F PMH HTN, alcohol use disorder, seizures, presenting for LOC and fall that occurred while she was alone at home. Pt reportedly lives w/ her partner but was alone when the episode occurred. Denies any prodromal symptoms, including palpitations, dizziness, lightheadedness, diaphoresis, etc. Reports chronic nausea that is unchanged from baseline. Denies fevers, chills, decreased appetite/decreased PO intake, CP, SOB, cough, abd pain, vomiting, diarrhea, constipation, dysuria, BLE edema. Pt is unsure if she hit her head, does not know how long she lost consciousness for, was found by her neighbor who called EMS. Her last seizure was a few months ago. Admits to occasional missed doses of her medications, does not remember the names of her meds besides keppra. Of note, pt is a poor historian. She reportedly follows regularly w/ a PCP and neurologist but does not recall the names of any of her providers.    ED course:  Vitals: afebrile, , /78, RR 17, SpO2 100% on RA  Labs: WBC/H&H/plt wnl, Na 125 --> 126, K 2.8, Cl 84 --> 85, bicarb 15 --> 20, AG 26 --> 21, BUN/Cr wnl, Mg 1.4, trops negative, blood alcohol negative. VBG pH 7.37, pCO2 35, pO2 30, bicarb 20.  EKG: NSR w/ no acute ischemic changes, QTc 456  Imaging:   *CXR: clear, pending read  *CT head: No acute intracranial hemorrhage or calvarial fracture.  *CT maxillofacial: No evidence of maxillofacial or mandibular fracture.  Interventions: LR 1L x1, klor 40 mEq PO x1 and 10mEq IV x1, Mg 2g IV x1, ibuprofen 400mg PO x1

## 2024-03-04 NOTE — H&P ADULT - SOCIAL HISTORY: SUBSTANCE USE
Former smoker, quit smoking <5 years ago, previously smoked 1/2 ppd but unable to quantify how long she smoked.

## 2024-03-04 NOTE — H&P ADULT - ASSESSMENT
58F PMH HTN, alcohol use disorder, seizures, presenting for LOC, found to have multiple electrolyte abnormalities, admitted for further evaluation and management.

## 2024-03-04 NOTE — H&P ADULT - NSHPLABSRESULTS_GEN_ALL_CORE
Detailed exam
.  LABS:                         11.7   6.64  )-----------( 250      ( 03 Mar 2024 21:38 )             33.1     03-03    126<L>  |  85<L>  |  23  ----------------------------<  161<H>  2.8<LL>   |  20<L>  |  0.76    Ca    9.4      03 Mar 2024 22:40  Mg     1.4     03-03        Urinalysis Basic - ( 03 Mar 2024 22:40 )    Color: x / Appearance: x / SG: x / pH: x  Gluc: 161 mg/dL / Ketone: x  / Bili: x / Urobili: x   Blood: x / Protein: x / Nitrite: x   Leuk Esterase: x / RBC: x / WBC x   Sq Epi: x / Non Sq Epi: x / Bacteria: x                RADIOLOGY, EKG & ADDITIONAL TESTS: Reviewed.

## 2024-03-04 NOTE — H&P ADULT - PROBLEM SELECTOR PLAN 1
Reports transient LOC/fall that occurred while pt was home alone, no prodromal symptoms, no seizure. Unclear if head trauma, unclear duration of LOC/time spent on the ground. Afebrile, VSS, SpO2 100% on RA. CBC wnl, BUN/Cr wnl, hyponatremia, hypochloremia, hypokalemia, hypomagnesemia, hypophosphatemia. EKG with NSR w/ new Q waves in II, III, aVF (compared w/ 09/2023), QTc 456. Troponin T negative on admission, no CP.    VBG pH 7.37, pCO2 35, pO2 30, bicarb 20.      *CXR: clear, pending read  *CT head: No acute intracranial hemorrhage or calvarial fracture.  *CT maxillofacial: No evidence of maxillofacial or mandibular fracture.  Interventions: LR 1L x1, ibuprofen 400mg PO x1 Reports transient LOC/fall that occurred while pt was home alone, no prodromal symptoms, no seizure. Unclear if head trauma, unclear duration of LOC/time spent on the ground. Afebrile, VSS, SpO2 100% on RA. CBC wnl, BUN/Cr wnl, hyponatremia, hypochloremia, hypokalemia, hypomagnesemia, hypophosphatemia. EKG NSR w/ no acute ischemic changes, QTc 456. Troponin T negative on admission, no CP. CXR: clear. CT head: No acute intracranial hemorrhage or calvarial fracture. CT maxillofacial: No evidence of maxillofacial or mandibular fracture. Pt appears dry on exam. Likely orthostatic i/s/o poor PO intake.  - f/u vEEG  - obtain orthostatics  - f/u TTE  - trend EKG/trops  - maintenance IVF with NS @ 100 cc/hr  - fall precautions acute metabolic encephalopathy 2/2 suspected seizure vs hyponatremia   Reports transient LOC/fall that occurred while pt was home alone, no prodromal symptoms, no seizure. Unclear if head trauma, unclear duration of LOC/time spent on the ground. Afebrile, VSS, SpO2 100% on RA. CBC wnl, BUN/Cr wnl, hyponatremia, hypochloremia, hypokalemia, hypomagnesemia, hypophosphatemia. EKG NSR w/ no acute ischemic changes, QTc 456. Troponin T negative on admission, no CP. CXR: clear. CT head: No acute intracranial hemorrhage or calvarial fracture. CT maxillofacial: No evidence of maxillofacial or mandibular fracture. Pt appears dry on exam. Likely orthostatic i/s/o poor PO intake.  - f/u vEEG  - obtain orthostatics  - negative TTE  - trend EKG/trops  - maintenance IVF with NS @ 100 cc/hr  - fall precautions

## 2024-03-04 NOTE — ED PROVIDER NOTE - CARE PLAN
1 Principal Discharge DX:	Recurrent falls  Secondary Diagnosis:	Hyponatremia  Secondary Diagnosis:	Hypokalemia  Secondary Diagnosis:	Hypomagnesemia

## 2024-03-04 NOTE — PATIENT PROFILE ADULT - NSPRESCRALCFREQ_GEN_A_NUR
ASSUMED CARE OF PT FROM ED AT 2215HRS. PT AOX4 AND LETS NEEDS BE KNOWN. FALL
PRECAUTION IN PLACE. PT WAS ORIENTED TO THE UNIT AND HER ROOM. PT WAS ABLE TO
ANSWER ALL ADMISSION RELATED QUESTIONS. PT IS ON TELE RUNNING SR/ST. SCDs HELD
PENDING US R/O DVT. PT REPORTED PAIN AND WAS TREATED WITH PRN PAIN MEDS.
ORDERS RECEIVED AND STARTED. PT WAS ABLE TO ANSWER ALL ADMISSION RELATED. PT
USES CPAP AT HS. PT WAS ABLE TO COMFORTABLE AND SLEEP PART OF THE SHIFT. VSS
AND NO S/S OF ACUTE DISTRESS. WILL CONTINUE TO MONITOR. 4 or more times a week 2-3 times a week

## 2024-03-04 NOTE — H&P ADULT - PROBLEM SELECTOR PLAN 4
Na 125 on admission --> 126. S/p LR 1L x1 in ED. Appears dry on exam.  - f/u serum osm  - f/u urine Na and urine osm  - trend BMP, Mg, phos  - restart diet once passes dysphagia screen Phos 1.8 on admission --> 0.8.  - potassium phos 30 mmol x1  - phos-Na-K powder packet x1  - f/u repeat EKG  - trend BMP, Mg, phos

## 2024-03-04 NOTE — ED PROVIDER NOTE - CLINICAL SUMMARY MEDICAL DECISION MAKING FREE TEXT BOX
Vital signs grossly unremarkable, heart rate 102, appears dry on exam  Patient has some bruising to the mid face around the eyes and nose but appear old not acute    Plan for CT head and face to evaluate for traumatic injuries  Medical workup for questionable dizziness, questionable LOC-will check labs to rule out Paton abnormalities, infection.  Doubt cardiac cause, will get EKG and troponin.  Unclear if drank alcohol today, will get alcohol level and U tox.  EKG normal sinus non-ischemic    --> Labs without leukocytosis, significant for hyponatremia, hypokalemia, hypomagnesemia, elevated anion gap without acidosis or hyperglycemia.  Alcohol negative, Troponin negative.  Discussed with patient, she says she does not eat much and has a very poor appetite in general.  Suspect electrolyte abnormalities and anion gap secondary to tea and toast diet as well as chronic alcohol abuse.  Pt not actively withdrawing. Ordered urine lytes as well as urine drug screen.  Patient received 1 L of IV fluids prior to lab results baseline appearing dry on exam.  Ordered magnesium and potassium for repletion, will admit, patient amenable to admission for electrolyte abnormalities, recurrent falls.

## 2024-03-05 ENCOUNTER — TRANSCRIPTION ENCOUNTER (OUTPATIENT)
Age: 59
End: 2024-03-05

## 2024-03-05 VITALS
SYSTOLIC BLOOD PRESSURE: 126 MMHG | HEART RATE: 72 BPM | RESPIRATION RATE: 18 BRPM | TEMPERATURE: 98 F | OXYGEN SATURATION: 100 % | DIASTOLIC BLOOD PRESSURE: 77 MMHG

## 2024-03-05 LAB
ALBUMIN SERPL ELPH-MCNC: 3 G/DL — LOW (ref 3.3–5)
ALBUMIN SERPL ELPH-MCNC: 3.8 G/DL — SIGNIFICANT CHANGE UP (ref 3.3–5)
ALP SERPL-CCNC: 50 U/L — SIGNIFICANT CHANGE UP (ref 40–120)
ALP SERPL-CCNC: 64 U/L — SIGNIFICANT CHANGE UP (ref 40–120)
ALT FLD-CCNC: 11 U/L — SIGNIFICANT CHANGE UP (ref 10–45)
ALT FLD-CCNC: 14 U/L — SIGNIFICANT CHANGE UP (ref 10–45)
ANION GAP SERPL CALC-SCNC: 11 MMOL/L — SIGNIFICANT CHANGE UP (ref 5–17)
ANION GAP SERPL CALC-SCNC: 14 MMOL/L — SIGNIFICANT CHANGE UP (ref 5–17)
APPEARANCE UR: CLEAR — SIGNIFICANT CHANGE UP
AST SERPL-CCNC: 16 U/L — SIGNIFICANT CHANGE UP (ref 10–40)
AST SERPL-CCNC: 22 U/L — SIGNIFICANT CHANGE UP (ref 10–40)
BILIRUB SERPL-MCNC: <0.2 MG/DL — SIGNIFICANT CHANGE UP (ref 0.2–1.2)
BILIRUB SERPL-MCNC: <0.2 MG/DL — SIGNIFICANT CHANGE UP (ref 0.2–1.2)
BILIRUB UR-MCNC: NEGATIVE — SIGNIFICANT CHANGE UP
BUN SERPL-MCNC: 11 MG/DL — SIGNIFICANT CHANGE UP (ref 7–23)
BUN SERPL-MCNC: 9 MG/DL — SIGNIFICANT CHANGE UP (ref 7–23)
CALCIUM SERPL-MCNC: 8.3 MG/DL — LOW (ref 8.4–10.5)
CALCIUM SERPL-MCNC: 8.6 MG/DL — SIGNIFICANT CHANGE UP (ref 8.4–10.5)
CHLORIDE SERPL-SCNC: 104 MMOL/L — SIGNIFICANT CHANGE UP (ref 96–108)
CHLORIDE SERPL-SCNC: 96 MMOL/L — SIGNIFICANT CHANGE UP (ref 96–108)
CO2 SERPL-SCNC: 20 MMOL/L — LOW (ref 22–31)
CO2 SERPL-SCNC: 21 MMOL/L — LOW (ref 22–31)
COLOR SPEC: YELLOW — SIGNIFICANT CHANGE UP
CREAT SERPL-MCNC: 0.47 MG/DL — LOW (ref 0.5–1.3)
CREAT SERPL-MCNC: 0.53 MG/DL — SIGNIFICANT CHANGE UP (ref 0.5–1.3)
DIFF PNL FLD: NEGATIVE — SIGNIFICANT CHANGE UP
EGFR: 107 ML/MIN/1.73M2 — SIGNIFICANT CHANGE UP
EGFR: 110 ML/MIN/1.73M2 — SIGNIFICANT CHANGE UP
FOLATE SERPL-MCNC: >20 NG/ML — SIGNIFICANT CHANGE UP
GLUCOSE SERPL-MCNC: 111 MG/DL — HIGH (ref 70–99)
GLUCOSE SERPL-MCNC: 164 MG/DL — HIGH (ref 70–99)
GLUCOSE UR QL: NEGATIVE MG/DL — SIGNIFICANT CHANGE UP
HCT VFR BLD CALC: 23.1 % — LOW (ref 34.5–45)
HCT VFR BLD CALC: 26.3 % — LOW (ref 34.5–45)
HGB BLD-MCNC: 7.9 G/DL — LOW (ref 11.5–15.5)
HGB BLD-MCNC: 9 G/DL — LOW (ref 11.5–15.5)
KETONES UR-MCNC: NEGATIVE MG/DL — SIGNIFICANT CHANGE UP
LEUKOCYTE ESTERASE UR-ACNC: NEGATIVE — SIGNIFICANT CHANGE UP
MAGNESIUM SERPL-MCNC: 1.4 MG/DL — LOW (ref 1.6–2.6)
MAGNESIUM SERPL-MCNC: 1.7 MG/DL — SIGNIFICANT CHANGE UP (ref 1.6–2.6)
MCHC RBC-ENTMCNC: 32.8 PG — SIGNIFICANT CHANGE UP (ref 27–34)
MCHC RBC-ENTMCNC: 33.3 PG — SIGNIFICANT CHANGE UP (ref 27–34)
MCHC RBC-ENTMCNC: 34.2 GM/DL — SIGNIFICANT CHANGE UP (ref 32–36)
MCHC RBC-ENTMCNC: 34.2 GM/DL — SIGNIFICANT CHANGE UP (ref 32–36)
MCV RBC AUTO: 95.9 FL — SIGNIFICANT CHANGE UP (ref 80–100)
MCV RBC AUTO: 97.4 FL — SIGNIFICANT CHANGE UP (ref 80–100)
NITRITE UR-MCNC: NEGATIVE — SIGNIFICANT CHANGE UP
NRBC # BLD: 0 /100 WBCS — SIGNIFICANT CHANGE UP (ref 0–0)
NRBC # BLD: 0 /100 WBCS — SIGNIFICANT CHANGE UP (ref 0–0)
PH UR: 6 — SIGNIFICANT CHANGE UP (ref 5–8)
PHOSPHATE SERPL-MCNC: 2.6 MG/DL — SIGNIFICANT CHANGE UP (ref 2.5–4.5)
PHOSPHATE SERPL-MCNC: 2.8 MG/DL — SIGNIFICANT CHANGE UP (ref 2.5–4.5)
PLATELET # BLD AUTO: 178 K/UL — SIGNIFICANT CHANGE UP (ref 150–400)
PLATELET # BLD AUTO: 201 K/UL — SIGNIFICANT CHANGE UP (ref 150–400)
POTASSIUM SERPL-MCNC: 3.2 MMOL/L — LOW (ref 3.5–5.3)
POTASSIUM SERPL-MCNC: 3.6 MMOL/L — SIGNIFICANT CHANGE UP (ref 3.5–5.3)
POTASSIUM SERPL-SCNC: 3.2 MMOL/L — LOW (ref 3.5–5.3)
POTASSIUM SERPL-SCNC: 3.6 MMOL/L — SIGNIFICANT CHANGE UP (ref 3.5–5.3)
PROT SERPL-MCNC: 4.8 G/DL — LOW (ref 6–8.3)
PROT SERPL-MCNC: 5.9 G/DL — LOW (ref 6–8.3)
PROT UR-MCNC: NEGATIVE MG/DL — SIGNIFICANT CHANGE UP
RBC # BLD: 2.41 M/UL — LOW (ref 3.8–5.2)
RBC # BLD: 2.7 M/UL — LOW (ref 3.8–5.2)
RBC # FLD: 13.2 % — SIGNIFICANT CHANGE UP (ref 10.3–14.5)
RBC # FLD: 13.3 % — SIGNIFICANT CHANGE UP (ref 10.3–14.5)
SODIUM SERPL-SCNC: 130 MMOL/L — LOW (ref 135–145)
SODIUM SERPL-SCNC: 136 MMOL/L — SIGNIFICANT CHANGE UP (ref 135–145)
SP GR SPEC: 1.01 — SIGNIFICANT CHANGE UP (ref 1–1.03)
T PALLIDUM AB TITR SER: NEGATIVE — SIGNIFICANT CHANGE UP
UROBILINOGEN FLD QL: 0.2 MG/DL — SIGNIFICANT CHANGE UP (ref 0.2–1)
UUN UR-MCNC: 175 MG/DL — SIGNIFICANT CHANGE UP
VIT B12 SERPL-MCNC: 459 PG/ML — SIGNIFICANT CHANGE UP (ref 232–1245)
WBC # BLD: 4.63 K/UL — SIGNIFICANT CHANGE UP (ref 3.8–10.5)
WBC # BLD: 6.02 K/UL — SIGNIFICANT CHANGE UP (ref 3.8–10.5)
WBC # FLD AUTO: 4.63 K/UL — SIGNIFICANT CHANGE UP (ref 3.8–10.5)
WBC # FLD AUTO: 6.02 K/UL — SIGNIFICANT CHANGE UP (ref 3.8–10.5)

## 2024-03-05 PROCEDURE — 82330 ASSAY OF CALCIUM: CPT

## 2024-03-05 PROCEDURE — 84295 ASSAY OF SERUM SODIUM: CPT

## 2024-03-05 PROCEDURE — 84132 ASSAY OF SERUM POTASSIUM: CPT

## 2024-03-05 PROCEDURE — 84100 ASSAY OF PHOSPHORUS: CPT

## 2024-03-05 PROCEDURE — 85025 COMPLETE CBC W/AUTO DIFF WBC: CPT

## 2024-03-05 PROCEDURE — 36415 COLL VENOUS BLD VENIPUNCTURE: CPT

## 2024-03-05 PROCEDURE — 99285 EMERGENCY DEPT VISIT HI MDM: CPT

## 2024-03-05 PROCEDURE — 83036 HEMOGLOBIN GLYCOSYLATED A1C: CPT

## 2024-03-05 PROCEDURE — 80048 BASIC METABOLIC PNL TOTAL CA: CPT

## 2024-03-05 PROCEDURE — 84484 ASSAY OF TROPONIN QUANT: CPT

## 2024-03-05 PROCEDURE — 85027 COMPLETE CBC AUTOMATED: CPT

## 2024-03-05 PROCEDURE — 95700 EEG CONT REC W/VID EEG TECH: CPT

## 2024-03-05 PROCEDURE — 80053 COMPREHEN METABOLIC PANEL: CPT

## 2024-03-05 PROCEDURE — 99233 SBSQ HOSP IP/OBS HIGH 50: CPT | Mod: GC

## 2024-03-05 PROCEDURE — 71045 X-RAY EXAM CHEST 1 VIEW: CPT

## 2024-03-05 PROCEDURE — 93306 TTE W/DOPPLER COMPLETE: CPT

## 2024-03-05 PROCEDURE — 82803 BLOOD GASES ANY COMBINATION: CPT

## 2024-03-05 PROCEDURE — 93005 ELECTROCARDIOGRAM TRACING: CPT

## 2024-03-05 PROCEDURE — 86850 RBC ANTIBODY SCREEN: CPT

## 2024-03-05 PROCEDURE — 80307 DRUG TEST PRSMV CHEM ANLYZR: CPT

## 2024-03-05 PROCEDURE — 70486 CT MAXILLOFACIAL W/O DYE: CPT | Mod: MC

## 2024-03-05 PROCEDURE — 86900 BLOOD TYPING SEROLOGIC ABO: CPT

## 2024-03-05 PROCEDURE — 84540 ASSAY OF URINE/UREA-N: CPT

## 2024-03-05 PROCEDURE — 96374 THER/PROPH/DIAG INJ IV PUSH: CPT

## 2024-03-05 PROCEDURE — 83930 ASSAY OF BLOOD OSMOLALITY: CPT

## 2024-03-05 PROCEDURE — 95705 EEG W/O VID 2-12 HR UNMNTR: CPT

## 2024-03-05 PROCEDURE — 84443 ASSAY THYROID STIM HORMONE: CPT

## 2024-03-05 PROCEDURE — 82607 VITAMIN B-12: CPT

## 2024-03-05 PROCEDURE — 96375 TX/PRO/DX INJ NEW DRUG ADDON: CPT

## 2024-03-05 PROCEDURE — 82550 ASSAY OF CK (CPK): CPT

## 2024-03-05 PROCEDURE — 83735 ASSAY OF MAGNESIUM: CPT

## 2024-03-05 PROCEDURE — 86901 BLOOD TYPING SEROLOGIC RH(D): CPT

## 2024-03-05 PROCEDURE — 70450 CT HEAD/BRAIN W/O DYE: CPT | Mod: MC

## 2024-03-05 PROCEDURE — 82746 ASSAY OF FOLIC ACID SERUM: CPT

## 2024-03-05 PROCEDURE — 81003 URINALYSIS AUTO W/O SCOPE: CPT

## 2024-03-05 PROCEDURE — 80177 DRUG SCRN QUAN LEVETIRACETAM: CPT

## 2024-03-05 PROCEDURE — 86780 TREPONEMA PALLIDUM: CPT

## 2024-03-05 RX ORDER — MAGNESIUM SULFATE 500 MG/ML
4 VIAL (ML) INJECTION ONCE
Refills: 0 | Status: COMPLETED | OUTPATIENT
Start: 2024-03-05 | End: 2024-03-05

## 2024-03-05 RX ORDER — POTASSIUM PHOSPHATE, MONOBASIC POTASSIUM PHOSPHATE, DIBASIC 236; 224 MG/ML; MG/ML
15 INJECTION, SOLUTION INTRAVENOUS ONCE
Refills: 0 | Status: COMPLETED | OUTPATIENT
Start: 2024-03-05 | End: 2024-03-05

## 2024-03-05 RX ORDER — TOPIRAMATE 25 MG
1 TABLET ORAL
Refills: 0 | DISCHARGE

## 2024-03-05 RX ORDER — POTASSIUM CHLORIDE 20 MEQ
40 PACKET (EA) ORAL ONCE
Refills: 0 | Status: COMPLETED | OUTPATIENT
Start: 2024-03-05 | End: 2024-03-05

## 2024-03-05 RX ADMIN — ENOXAPARIN SODIUM 40 MILLIGRAM(S): 100 INJECTION SUBCUTANEOUS at 06:15

## 2024-03-05 RX ADMIN — Medication 105 MILLIGRAM(S): at 13:22

## 2024-03-05 RX ADMIN — Medication 25 GRAM(S): at 10:17

## 2024-03-05 RX ADMIN — Medication 105 MILLIGRAM(S): at 06:15

## 2024-03-05 RX ADMIN — LEVETIRACETAM 500 MILLIGRAM(S): 250 TABLET, FILM COATED ORAL at 06:14

## 2024-03-05 RX ADMIN — Medication 40 MILLIEQUIVALENT(S): at 10:17

## 2024-03-05 RX ADMIN — POTASSIUM PHOSPHATE, MONOBASIC POTASSIUM PHOSPHATE, DIBASIC 62.5 MILLIMOLE(S): 236; 224 INJECTION, SOLUTION INTRAVENOUS at 13:23

## 2024-03-05 RX ADMIN — Medication 1 MILLIGRAM(S): at 06:15

## 2024-03-05 NOTE — PROGRESS NOTE ADULT - PROBLEM SELECTOR PLAN 11
11-Dec[birth year]2020
F: None   E: Replete as necessary K>4 Mg>2  N: DASH diet   DVT Prophylaxis: Lovenox 40mg SQ qd  GI prophylaxis: None   CODE STATUS: FULL CODE  Dispo: [facility]

## 2024-03-05 NOTE — PROGRESS NOTE ADULT - PROBLEM SELECTOR PLAN 5
Na 125 on admission --> 126. S/p LR 1L x1 in ED. Appears dry on exam. Likely hypovolemic hyponatremia i/s/o poor PO intake. Improved from 125 to 126 after 1L of LR in ED.  - maintenance IVF with NS at 100 cc/hr  - f/u serum osm  - f/u urine Na and urine osm  - trend BMP, Mg, phos  - restart diet once passes dysphagia screen Phos 1.8 on admission --> 0.8.  - potassium phos 30 mmol x1  - phos-Na-K powder packet x1  - trend BMP, Mg, phos

## 2024-03-05 NOTE — PROGRESS NOTE ADULT - PROBLEM SELECTOR PLAN 10
Unclear if pt is on home meds for HTN as she cannot recall, no antihypertensive meds in surescripts. In 09/2023 pt was discharged on clonidine 0.1mg BID and hydral 25mg QID.  - formal med rec in AM  - obtain collateral from PCP

## 2024-03-05 NOTE — PROGRESS NOTE ADULT - PROBLEM SELECTOR PLAN 9
Hx of alcohol use disorder, admitted 9/5-9/7/23 to MICU for seizures i/s/o alcohol withdrawal and noncompliance w/ keppra. Blood alcohol negative on admission. CIWA 0 on admission.  - thiamine 200mg IV qd x 5 days, followed by 100mg PO qd  - folate 1mg IV qd --> transition to 1mg PO qd on discharge  - f/u utox  - CIWA protocol

## 2024-03-05 NOTE — DISCHARGE NOTE NURSING/CASE MANAGEMENT/SOCIAL WORK - PATIENT PORTAL LINK FT
You can access the FollowMyHealth Patient Portal offered by [facility] by registering at the following website: [email or web address]. By joining [employer]’s FollowMyHealth portal, you will also be able to view your health information using other applications (apps) compatible with our system.

## 2024-03-05 NOTE — PROGRESS NOTE ADULT - PROBLEM SELECTOR PLAN 7
Reportedly takes keppra but cannot recall dosing or frequency, admits to occasional missed doses, last prescription in [facility] is from 08/2023. Admitted 9/5-9/7/23 to MICU for seizures i/s/o alcohol withdrawal, was noncompliant w/ keppra per chart review, was discharged w/ keppra 500mg BID.  - c/w keppra 500mg PO BID  - f/u vEEG  - confirm med rec in AM  - obtain collateral from PCP/neurologist Mg 1.4 on admission. S/p Mg 2g IV x1 in ED.  - trend BMP, Mg, phos  - replete as indicated

## 2024-03-05 NOTE — PROGRESS NOTE ADULT - PROBLEM SELECTOR PLAN 3
K 2.8 on admission. S/p klor 40 mEq PO x1 and 10mEq IV x1 in ED. Repeat K 4.0. EKG with NSR w/ new Q waves in II, III, aVF (compared w/ 09/2023), QTc 456. Troponin T negative on admission, no CP.  - trend BMP, Mg, phos  - replete as indicated  - f/u repeat EKG RESOLVED   On admission Na 125, K 2.8, Cl 84, bicarb 15, AG 26, BUN/Cr wnl. VBG obtained after IVF. HAGMA likely starvation ketosis i/s/o poor PO intake.  - continue to monitor

## 2024-03-05 NOTE — DISCHARGE NOTE NURSING/CASE MANAGEMENT/SOCIAL WORK - NSDCPEFALRISK_GEN_ALL_CORE
For information on Fall & Injury Prevention, visit: [email or web address] OR  [email or web address] OR  https://www.cdc.gov/steadi/patient.html

## 2024-03-05 NOTE — PROGRESS NOTE ADULT - PROBLEM SELECTOR PLAN 8
Missing teeth with exposed screws. Reportedly follows w/ dentist regularly but cannot recall their name. Reports pain as her tongue scrapes against the exposed screws. S/p ibuprofen 400mg PO x1 in ED.  - tylenol 650mg PO q6h PRN for pain  - benzocaine 20% spray q6h PRN for oral pain  - dysphagia screen  - outpatient f/u with dentist

## 2024-03-05 NOTE — PROGRESS NOTE ADULT - PROBLEM SELECTOR PLAN 4
Phos 1.8 on admission --> 0.8.  - potassium phos 30 mmol x1  - phos-Na-K powder packet x1  - f/u repeat EKG  - trend BMP, Mg, phos K 2.8 on admission. S/p klor 40 mEq PO x1 and 10mEq IV x1 in ED. Repeat K 4.0. EKG with NSR w/ new Q waves in II, III, aVF (compared w/ 09/2023), QTc 456. Troponin T negative on admission, no CP.  - trend BMP, Mg, phos  - replete as indicated

## 2024-03-05 NOTE — PROGRESS NOTE ADULT - PROBLEM SELECTOR PLAN 2
On admission Na 125, K 2.8, Cl 84, bicarb 15, AG 26, BUN/Cr wnl. VBG obtained after IVF. HAGMA likely starvation ketosis i/s/o poor PO intake.  - resolving  - continue to monitor Reportedly takes keppra but cannot recall dosing or frequency, admits to occasional missed doses, last prescription in [facility] is from 08/2023. Admitted 9/5-9/7/23 to MICU for seizures i/s/o alcohol withdrawal, was noncompliant w/ keppra per chart review, was discharged w/ keppra 500mg BID.  vEEG removed by patient and not allowed to be placed back.   Plan:  - c/w keppra 500mg PO BID daily

## 2024-03-05 NOTE — DISCHARGE NOTE PROVIDER - CARE PROVIDER_API CALL
LEO GAYTAN  [address] [place] [address]  Phone: [phone]  Fax: [phone]  Scheduled Appointment: 03/19/2024 10:20 AM

## 2024-03-05 NOTE — DISCHARGE NOTE PROVIDER - HOSPITAL COURSE
#Discharge: do not delete    58F PMH HTN, alcohol use disorder, seizures, presenting for LOC, found to have multiple electrolyte abnormalities, admitted for further evaluation and management.    Problem List/Main Diagnoses:     #Fall   #Acute metabolic encephalopathy 2/2 suspected seizure  Reports transient LOC/fall that occurred while pt was home alone, no prodromal symptoms, no seizure. Unclear if head trauma, unclear duration of LOC/time spent on the ground. Afebrile, VSS, SpO2 100% on RA. CBC wnl, BUN/Cr wnl, hyponatremia, hypochloremia, hypokalemia, hypomagnesemia, hypophosphatemia. EKG NSR w/ no acute ischemic changes, QTc 456. Troponin T negative on admission, no CP. CXR: clear. CT head: No acute intracranial hemorrhage or calvarial fracture. CT maxillofacial: No evidence of maxillofacial or mandibular fracture. Pt appears dry on exam. Likely orthostatic i/s/o poor PO intake.  Patient removed vEEG early during the admission, therefore, unable to obtain an accurate reading. negative TTE.  EKG/trops WNL,   Plan:  - Encouraged patient to take her Keppra daily for seizures and to abstain from alcohol   - Proper Hydration     #History of Seizures   Reportedly takes keppra but cannot recall dosing or frequency, admits to occasional missed doses, last prescription in [facility] is from 08/2023. Admitted 9/5-9/7/23 to MICU for seizures i/s/o alcohol withdrawal, was noncompliant w/ keppra per chart review, was discharged w/ keppra 500mg BID.  vEEG removed by patient and not allowed to be placed back.   Plan:  - c/w keppra 500mg PO BID daily       #Metabolic Acidosis, High Anion Gap RESOLVED  On admission Na 125, K 2.8, Cl 84, bicarb 15, AG 26, BUN/Cr wnl. VBG obtained after IVF. HAGMA likely starvation ketosis i/s/o poor PO intake.  - continue to monitor    #Hypokalemia   K 2.8 on admission. S/p klor 40 mEq PO x1 and 10mEq IV x1 in ED. Repeat K 4.0. EKG with NSR w/ new Q waves in II, III, aVF (compared w/ 09/2023), QTc 456. Troponin T negative on admission, no CP.  - replete as indicated    #Hypophosphatemia   Phos 1.8 on admission --> 0.8.  - potassium phos 30 mmol x1  - phos-Na-K powder packet x1  - trend BMP, Mg, phos.    #Hyponatremia   Na 125 on admission --> 126. S/p LR 1L x1 in ED. Appears dry on exam. Likely hypovolemic hyponatremia i/s/o poor PO intake. Improved from 125 to 126 after 1L of LR in ED.  - maintenance IVF with NS at 100 cc/hr  - f/u serum osm  - f/u urine Na and urine osm  - trend BMP, Mg, phos  - restart diet once passes dysphagia screen.    #Hypomagnesemia   Mg 1.4 on admission. S/p Mg 2g IV x1 in ED.  - trend BMP, Mg, phos  - replete as indicated.    #Alcohol Abuse   Hx of alcohol use disorder, admitted 9/5-9/7/23 to MICU for seizures i/s/o alcohol withdrawal and noncompliance w/ keppra. Blood alcohol negative on admission. CIWA 0 on admission.  - thiamine 200mg IV qd x 5 days, followed by 100mg PO qd  - folate 1mg IV qd --> transition to 1mg PO qd on discharge  - f/u utox  - CIWA protocol.    #HTN    New medications/therapies: None  New lines/hardware: None   Labs to be followed outpatient: None   Exam to be followed outpatient: NOne     Discharge plan: discharge to Home     Physical Exam Upon Discharge:  T(C): 37.1 (03-05-24 @ 06:55), Max: 37.1 (03-05-24 @ 06:55)  HR: 83 (03-05-24 @ 06:55) (68 - 83)  BP: 102/70 (03-05-24 @ 06:55) (102/70 - 118/76)  RR: 17 (03-05-24 @ 06:55) (16 - 17)  SpO2: 97% (03-05-24 @ 06:55) (97% - 100%)    General: NAD, laying in bed, speaking in full sentences  HEENT: head NC/AT, no conjunctival injection, EOMI, MMM. Poor dentition.   Neck: supple, no JVD  Cardio: RRR, +S1/S2, no M/R/G  Resp: lungs CTAB, no cough/wheezes/rales/rhonchi  Abdo: soft, NT, ND, +bowel sounds x4, no organomegaly or palpable mass    Extremities: WWP, no edema/cyanosis/clubbing   Vasc: 2+ radial and DP pulses b/l  Neuro: A&Ox3  Psych: speech non-pressured, thoughts goal-oriented  Skin: dry, intact, no visible jaundice   MSK: no joint swelling       58F PMH HTN, alcohol use disorder, seizures, presenting for LOC, found to have multiple electrolyte abnormalities, admitted for further evaluation and management.    Problem List/Main Diagnoses:     #Fall   #Acute metabolic encephalopathy 2/2 suspected seizure  Reports transient LOC/fall that occurred while pt was home alone, no prodromal symptoms, no seizure. Unclear if head trauma, unclear duration of LOC/time spent on the ground. Afebrile, VSS, SpO2 100% on RA. CBC wnl, BUN/Cr wnl, hyponatremia, hypochloremia, hypokalemia, hypomagnesemia, hypophosphatemia. EKG NSR w/ no acute ischemic changes, QTc 456. Troponin T negative on admission, no CP. CXR: clear. CT head: No acute intracranial hemorrhage or calvarial fracture. CT maxillofacial: No evidence of maxillofacial or mandibular fracture. Pt appears dry on exam. Likely orthostatic i/s/o poor PO intake.  Patient removed vEEG early during the admission, therefore, unable to obtain an accurate reading. negative TTE.  EKG/trops WNL,   Plan:  - Encouraged patient to take her Keppra daily for seizures and to abstain from alcohol   - Proper Hydration     #History of Seizures   Reportedly takes keppra but cannot recall dosing or frequency, admits to occasional missed doses, last prescription in [facility] is from 08/2023. Admitted 9/5-9/7/23 to MICU for seizures i/s/o alcohol withdrawal, was noncompliant w/ keppra per chart review, was discharged w/ keppra 500mg BID.  vEEG removed by patient and not allowed to be placed back.   Plan:  - c/w keppra 500mg PO BID daily       #Metabolic Acidosis, High Anion Gap RESOLVED  On admission Na 125, K 2.8, Cl 84, bicarb 15, AG 26, BUN/Cr wnl. VBG obtained after IVF. HAGMA likely starvation ketosis i/s/o poor PO intake.  - continue to monitor    #Hypokalemia   K 2.8 on admission. S/p klor 40 mEq PO x1 and 10mEq IV x1 in ED. Repeat K 4.0. EKG with NSR w/ new Q waves in II, III, aVF (compared w/ 09/2023), QTc 456. Troponin T negative on admission, no CP.  - replete as indicated    #Hypophosphatemia   Phos 1.8 on admission --> 0.8.  - potassium phos 30 mmol x1  - phos-Na-K powder packet x1  - trend BMP, Mg, phos.    #Hyponatremia   Na 125 on admission --> 126. S/p LR 1L x1 in ED. Appears dry on exam. Likely hypovolemic hyponatremia i/s/o poor PO intake. Improved from 125 to 126 after 1L of LR in ED.  - maintenance IVF with NS at 100 cc/hr  - f/u serum osm  - f/u urine Na and urine osm  - trend BMP, Mg, phos  - restart diet once passes dysphagia screen.    #Hypomagnesemia   Mg 1.4 on admission. S/p Mg 2g IV x1 in ED.  - trend BMP, Mg, phos  - replete as indicated.    #Alcohol Abuse   Hx of alcohol use disorder, admitted 9/5-9/7/23 to MICU for seizures i/s/o alcohol withdrawal and noncompliance w/ keppra. Blood alcohol negative on admission. CIWA 0 on admission.  - thiamine 200mg IV qd x 5 days, followed by 100mg PO qd  - folate 1mg IV qd --> transition to 1mg PO qd on discharge  - f/u utox  - CIWA protocol.    #HTN  Unclear if pt is on home meds for HTN as she cannot recall, no antihypertensive meds in surescripts. In 09/2023 pt was discharged on clonidine 0.1mg BID and hydral 25mg QID.  - c/w Home meds    New medications/therapies: None  New lines/hardware: None   Labs to be followed outpatient: None   Exam to be followed outpatient: NOne     Discharge plan: discharge to Home     Physical Exam Upon Discharge:  T(C): 37.1 (03-05-24 @ 06:55), Max: 37.1 (03-05-24 @ 06:55)  HR: 83 (03-05-24 @ 06:55) (68 - 83)  BP: 102/70 (03-05-24 @ 06:55) (102/70 - 118/76)  RR: 17 (03-05-24 @ 06:55) (16 - 17)  SpO2: 97% (03-05-24 @ 06:55) (97% - 100%)    General: NAD, laying in bed, speaking in full sentences  HEENT: head NC/AT, no conjunctival injection, EOMI, MMM. Poor dentition.   Neck: supple, no JVD  Cardio: RRR, +S1/S2, no M/R/G  Resp: lungs CTAB, no cough/wheezes/rales/rhonchi  Abdo: soft, NT, ND, +bowel sounds x4, no organomegaly or palpable mass    Extremities: WWP, no edema/cyanosis/clubbing   Vasc: 2+ radial and DP pulses b/l  Neuro: A&Ox3  Psych: speech non-pressured, thoughts goal-oriented  Skin: dry, intact, no visible jaundice   MSK: no joint swelling

## 2024-03-05 NOTE — PROGRESS NOTE ADULT - ATTENDING COMMENTS
pt presented with LOC and fall admitted for acute metabolic encephalopathy 2/2 suspected seizure vs hyponatremia vs alcohol intoxication   pt is non compliant with keppra - took VEEG off during this admission   keppra levels were not sent on admission - home medications were started and no witnessed seizures here   restarted on keppra after extensive conversation regarding taking meds as prescribed and avoiding alcohol use while being on this medication   pt was started on ciwa protocol - with mv , fa, thiamine   hyponatremic on admission Na 125 . improved after IVF cw hypovolemic hyponatremia.   na 127 -[phone]  fluids discontinued and pt is ready for discharge   AGMA - suspect starvation ketosis- lactate was not sent on admission - AG closed - bicarb imprved to 21 resolving after IVF  TTE negative   hypokalemia/ hypophosphatemia/hypomag: will replete labs abnormalities and repeat labs -- monitor for re-feeding syn  dvt pp x qd  lovenox  dispo: MR for dc - refusing leaving today bc her teeth are uncomfortable. tired to explain we don't have dental services inpatient and pt will beenfit from going home and making an appointment, will rcv dc notice but agreeable for discharge tomorrow

## 2024-03-05 NOTE — DISCHARGE NOTE PROVIDER - NSDCCPCAREPLAN_GEN_ALL_CORE_FT
PRINCIPAL DISCHARGE DIAGNOSIS  Diagnosis: Seizures  Assessment and Plan of Treatment: A seizure is a medical condition where you have a temporary, unstoppable surge of electrical activity in your brain. When that happens, the affected brain cells uncontrollably fire signals to others around them. This kind of electrical activity overloads the affected areas of your brain.  That overload can cause a wide range of symptoms or effects. The possible symptoms include abnormal sensations, passing out and uncontrolled muscle movements. Treatment options, depending on seizure type, include medications, surgeries and special diet changes.  The term “seizure” comes from the ancient belief in multiple cultures that seizures were a sign of possession by an evil spirit or demon. But modern medicine has uncovered the truth: Everyone can have seizures, and some people can have them more easily than others.   INSTRUCTIONS:  - continue taking Keppra, it is very improtant in order to prevent further episodes   - We recommend abstinence from alcohol  - follow up with your primary care      SECONDARY DISCHARGE DIAGNOSES  Diagnosis: Hyponatremia  Assessment and Plan of Treatment:     Diagnosis: Hypokalemia  Assessment and Plan of Treatment:     Diagnosis: Hypomagnesemia  Assessment and Plan of Treatment:      PRINCIPAL DISCHARGE DIAGNOSIS  Diagnosis: Seizures  Assessment and Plan of Treatment: A seizure is a medical condition where you have a temporary, unstoppable surge of electrical activity in your brain. When that happens, the affected brain cells uncontrollably fire signals to others around them. This kind of electrical activity overloads the affected areas of your brain.  That overload can cause a wide range of symptoms or effects. The possible symptoms include abnormal sensations, passing out and uncontrolled muscle movements. Treatment options, depending on seizure type, include medications, surgeries and special diet changes.  The term “seizure” comes from the ancient belief in multiple cultures that seizures were a sign of possession by an evil spirit or demon. But modern medicine has uncovered the truth: Everyone can have seizures, and some people can have them more easily than others.   INSTRUCTIONS:  - continue taking Keppra, it is very improtant in order to prevent further episodes   - We recommend abstinence from alcohol  - follow up with your primary care

## 2024-03-05 NOTE — PROGRESS NOTE ADULT - SUBJECTIVE AND OBJECTIVE BOX
OVERNIGHT EVENTS: LOUIS     SUBJECTIVE:  The patient was seen and examined at the bedside this AM. She states that she is feeling better today     VITAL SIGNS:  Vital Signs Last 24 Hrs  T(C): 36.8 (05 Mar 2024 13:03), Max: 37.1 (05 Mar 2024 06:55)  T(F): 98.3 (05 Mar 2024 13:03), Max: 98.7 (05 Mar 2024 06:55)  HR: 72 (05 Mar 2024 13:03) (68 - 83)  BP: 126/77 (05 Mar 2024 13:03) (102/70 - 126/77)  BP(mean): 80 (05 Mar 2024 06:55) (80 - 80)  RR: 18 (05 Mar 2024 13:03) (17 - 18)  SpO2: 100% (05 Mar 2024 13:03) (97% - 100%)    Parameters below as of 05 Mar 2024 13:03  Patient On (Oxygen Delivery Method): room air        PHYSICAL EXAM:  General: NAD; speaking in full sentences  HEENT: PERRL; EOMI; MMM  Neck: supple; no JVD  Cardiac: RRR; +S1/S2  Pulm: CTA B/L; no W/R/R  GI: soft, NT/ND, +BS  Extremities: WWP; no edema, clubbing or cyanosis  Vasc: 2+ radial, DP pulses B/L  Neuro: AAOx3; no focal deficits    MEDICATIONS:  MEDICATIONS  (STANDING):  enoxaparin Injectable 40 milliGRAM(s) SubCutaneous every 24 hours  folic acid Injectable 1 milliGRAM(s) IV Push every 24 hours  influenza   Vaccine 0.5 milliLiter(s) IntraMuscular once  levETIRAcetam 500 milliGRAM(s) Oral every 12 hours  thiamine IVPB 500 milliGRAM(s) IV Intermittent every 8 hours    MEDICATIONS  (PRN):  benzocaine 20% Spray 1 Spray(s) Topical every 6 hours PRN oral pain  LORazepam   Injectable 2 milliGRAM(s) IV Push every 1 hour PRN CIWA >7      ALLERGIES:  Allergies    No Known Allergies    Intolerances        LABS:                        9.0    6.02  )-----------( 201      ( 05 Mar 2024 14:46 )             26.3     03-05    130<L>  |  96  |  9   ----------------------------<  164<H>  3.6   |  20<L>  |  0.53    Ca    8.6      05 Mar 2024 14:46  Phos  2.6     03-05  Mg     1.7     03-05    TPro  5.9<L>  /  Alb  3.8  /  TBili  <0.2  /  DBili  x   /  AST  22  /  ALT  14  /  AlkPhos  64  03-05        RADIOLOGY & ADDITIONAL TESTS: Reviewed.

## 2024-03-05 NOTE — PROGRESS NOTE ADULT - PROBLEM SELECTOR PLAN 1
acute metabolic encephalopathy 2/2 suspected seizure vs hyponatremia   Reports transient LOC/fall that occurred while pt was home alone, no prodromal symptoms, no seizure. Unclear if head trauma, unclear duration of LOC/time spent on the ground. Afebrile, VSS, SpO2 100% on RA. CBC wnl, BUN/Cr wnl, hyponatremia, hypochloremia, hypokalemia, hypomagnesemia, hypophosphatemia. EKG NSR w/ no acute ischemic changes, QTc 456. Troponin T negative on admission, no CP. CXR: clear. CT head: No acute intracranial hemorrhage or calvarial fracture. CT maxillofacial: No evidence of maxillofacial or mandibular fracture. Pt appears dry on exam. Likely orthostatic i/s/o poor PO intake.  - f/u vEEG  - obtain orthostatics  - negative TTE  - trend EKG/trops  - maintenance IVF with NS @ 100 cc/hr  - fall precautions acute metabolic encephalopathy 2/2 suspected seizure vs hyponatremia   Reports transient LOC/fall that occurred while pt was home alone, no prodromal symptoms, no seizure. Unclear if head trauma, unclear duration of LOC/time spent on the ground. Afebrile, VSS, SpO2 100% on RA. CBC wnl, BUN/Cr wnl, hyponatremia, hypochloremia, hypokalemia, hypomagnesemia, hypophosphatemia. EKG NSR w/ no acute ischemic changes, QTc 456. Troponin T negative on admission, no CP. CXR: clear. CT head: No acute intracranial hemorrhage or calvarial fracture. CT maxillofacial: No evidence of maxillofacial or mandibular fracture. Pt appears dry on exam. Likely orthostatic i/s/o poor PO intake.  Patient removed vEEG early during the admission, therefore, unable to obtain an accurate reading. negative TTE.  EKG/trops WNL,   Plan:  - Stopped IV fluids  - Encouraged patient to take her Keppra daily for seizures and to abstain from alcohol   - Proper Hydration

## 2024-03-07 LAB — LEVETIRACETAM SERPL-MCNC: 12.8 UG/ML — SIGNIFICANT CHANGE UP (ref 10–40)

## 2024-03-11 DIAGNOSIS — G40.909 EPILEPSY, UNSPECIFIED, NOT INTRACTABLE, WITHOUT STATUS EPILEPTICUS: ICD-10-CM

## 2024-03-11 DIAGNOSIS — G93.41 METABOLIC ENCEPHALOPATHY: ICD-10-CM

## 2024-03-11 DIAGNOSIS — E83.42 HYPOMAGNESEMIA: ICD-10-CM

## 2024-03-11 DIAGNOSIS — E87.8 OTHER DISORDERS OF ELECTROLYTE AND FLUID BALANCE, NOT ELSEWHERE CLASSIFIED: ICD-10-CM

## 2024-03-11 DIAGNOSIS — I10 ESSENTIAL (PRIMARY) HYPERTENSION: ICD-10-CM

## 2024-03-11 DIAGNOSIS — F10.10 ALCOHOL ABUSE, UNCOMPLICATED: ICD-10-CM

## 2024-03-11 DIAGNOSIS — E83.39 OTHER DISORDERS OF PHOSPHORUS METABOLISM: ICD-10-CM

## 2024-03-11 DIAGNOSIS — E87.6 HYPOKALEMIA: ICD-10-CM

## 2024-03-11 DIAGNOSIS — E87.1 HYPO-OSMOLALITY AND HYPONATREMIA: ICD-10-CM

## 2024-03-11 DIAGNOSIS — Z87.891 PERSONAL HISTORY OF NICOTINE DEPENDENCE: ICD-10-CM

## 2024-03-11 DIAGNOSIS — E87.20 ACIDOSIS, UNSPECIFIED: ICD-10-CM

## 2024-03-26 NOTE — DISCHARGE NOTE NURSING/CASE MANAGEMENT/SOCIAL WORK - NURSING SECTION COMPLETE
[FreeTextEntry1] : Mild hyperthyroidism. Likely subclinical Graves disease We discussed that his symptoms might or might not be related to overactive thyroid, but there indication for treating his hyperthyroidism. Available therapeutic approaches to hyperthyroidism, including antithyroidal medications, I-131 therapy, and total thyroidectomy were reviewed.  Risk and benefits of each approach were extensively discussed (including, but not limited to permanent hypothyroidism post-thyroidectomy or I-131 therapy, and increased risk of life-threatening neutropenia or liver failure on thionamides, a very minimal increased risk in secondary malignancies post MURCIA). Mutually agreed to start MMI 5 mg qd and retest in about  6-8 weeks Screening DXA scan. RTC 6-8 weeks.  Patient/Caregiver provided printed discharge information.

## 2024-04-03 NOTE — PATIENT PROFILE ADULT - ..
Presents today for follow up after undergoing EGD and colonoscopy procedures by Dr. Obregon on 11/29/2023.  Patient was c/o upper abdominal pain.  EGD:  Reflux esophagitis (neg Eddy's), small hiatal hernia, erosive gastritis (neg HP), normal duodenum.  Colonoscopy was normal with small internal hemorrhoids noted on exam.  Due to family hx of colon cancer in her father, surveillance colonoscopy recommended in 5 years.
05-Sep-2023 11:47:51

## 2024-04-16 ENCOUNTER — APPOINTMENT (OUTPATIENT)
Dept: NEUROLOGY | Facility: CLINIC | Age: 59
End: 2024-04-16
Payer: COMMERCIAL

## 2024-04-16 VITALS
WEIGHT: 110 LBS | HEIGHT: 63 IN | SYSTOLIC BLOOD PRESSURE: 152 MMHG | TEMPERATURE: 98.2 F | HEART RATE: 99 BPM | BODY MASS INDEX: 19.49 KG/M2 | OXYGEN SATURATION: 99 % | DIASTOLIC BLOOD PRESSURE: 94 MMHG

## 2024-04-16 PROCEDURE — 99215 OFFICE O/P EST HI 40 MIN: CPT

## 2024-04-16 PROCEDURE — G2211 COMPLEX E/M VISIT ADD ON: CPT

## 2024-04-16 RX ORDER — MULTIVITAMIN
TABLET ORAL DAILY
Qty: 30 | Refills: 6 | Status: ACTIVE | COMMUNITY
Start: 2024-04-16 | End: 1900-01-01

## 2024-04-16 RX ORDER — LEVETIRACETAM 500 MG/1
500 TABLET, FILM COATED ORAL TWICE DAILY
Qty: 60 | Refills: 6 | Status: ACTIVE | COMMUNITY
Start: 1900-01-01 | End: 1900-01-01

## 2024-04-16 RX ORDER — FOLIC ACID 1 MG/1
1 TABLET ORAL
Qty: 90 | Refills: 3 | Status: ACTIVE | COMMUNITY
Start: 2024-04-16 | End: 1900-01-01

## 2024-04-16 RX ORDER — TOPIRAMATE 50 MG/1
50 TABLET, FILM COATED ORAL
Qty: 30 | Refills: 3 | Status: DISCONTINUED | COMMUNITY
Start: 2023-06-22 | End: 2024-04-16

## 2024-04-16 NOTE — DISCUSSION/SUMMARY
[FreeTextEntry1] : 56 y/o woman with ETOH abuse and recurrent seizures due to ETOH w/d On keppra 500 mg BID

## 2024-04-16 NOTE — PHYSICAL EXAM
[Person] : oriented to person [Place] : oriented to place [Time] : oriented to time [Short Term Intact] : short term memory impaired [Remote Intact] : remote memory intact [Naming Objects] : no difficulty naming common objects [Repeating Phrases] : no difficulty repeating a phrase [Fluency] : fluency intact [Comprehension] : comprehension intact [Cranial Nerves Optic (II)] : visual acuity intact bilaterally,  visual fields full to confrontation, pupils equal round and reactive to light [Cranial Nerves Oculomotor (III)] : extraocular motion intact [Motor Tone] : muscle tone was normal in all four extremities [Motor Handedness Right-Handed] : the patient is right hand dominant [Sensation Tactile Decrease] : light touch was intact [Limited Balance] : the patient's balance was impaired [2+] : Patella left 2+

## 2024-04-16 NOTE — HISTORY OF PRESENT ILLNESS
[FreeTextEntry1] : Follow up   57 RH female with alcohol abuse and seizures,    HPI    4/16/24  Doing well  No seizures Feels that she gets dizzy. No medical issues except for SOB   9/19/23 HPI: Recent MICU admission for alcohol induced seizure on 9/7. Fell in living room, witnessed by . CT head showed large frontal hematoma but no fracture. L olecranon fracture - ortho consulted and recommended ORIF but pt refused. vEEG remained normal, discharged on home Topiramate 25mg and Keppra 500mg BID.  No seizures since discharge. Pt hasn't scheduled follow up with ortho. Notes she has cut back on drinking, only 1 glass of wine with dinner. ETOH abuse x 10 years  Ex  in [place].   Drinks 2 btl a nights. 8-10 btls a week Was drinking Vodka in the past.    Sleeps poorly Mood: down Never wanted to do rehab   Hospital course  #Seizure   Pt with recent seizure + witness seizure in ED noted to have generalized tonic  clonic shaking w/ foaming at mouth. Lasted 1-2 minutes. Uncertain if history of  seizure disorder. Per , no seizure prior to last 15 months. History of  alcohol use disorder with daily alcohol use, but reports not drinking day of  seizure. Seizure possibly alcohol withdrawal related vs. baseline seizure  disorder. Awaiting Keppra level results, will report levels to pt as it is send  out lab. Per pharmacy, Keppra last picked up 03/2022. vEEG demonstrated no  seizure activity. Epilepsy followed with recs as below   -Keppra to 750mg QD  -Phenobarbital 32.4mg QHS   # Alcohol withdrawal  Pt with alcohol use disorder as per  who states they find bottles of  vodka in garbage that belongs to pt. Suspected last drink 7/25. Negative CT  head and no FND on exam. Blood alcohol <10. Pt afebrile, no leukocytosis.  Received 2mg ativan in ED. On exam in ED, pt mildly agitated and anxious in  bed, non combative but needing redirection. AAOx3. 7/28/22, patient was AAOx3,  non combative but needing redirection, not linear stories. Recieved thiamine IV  500mg. Started MV and folate. CIWA 0 at time of discharge. Folate/B12 WNL  -Thiamine 1 g PO QD  -Counseling provided to alcohol cessation   # Hypertension.  Hx of HTN. /95 on admission. Currently Normotensive. At home is on  clonidine PO 1 mg BID, hydralazine PO 25mg BID with poor adherence.  -C/w home medications   Patient was discharged to: home with home PT recs    AED Keppra 500 mg BID B1 vit

## 2024-04-30 ENCOUNTER — EMERGENCY (EMERGENCY)
Facility: HOSPITAL | Age: 59
LOS: 1 days | Discharge: HOME | End: 2024-04-30
Attending: STUDENT IN AN ORGANIZED HEALTH CARE EDUCATION/TRAINING PROGRAM | Admitting: STUDENT IN AN ORGANIZED HEALTH CARE EDUCATION/TRAINING PROGRAM
Payer: COMMERCIAL

## 2024-04-30 VITALS
DIASTOLIC BLOOD PRESSURE: 86 MMHG | WEIGHT: 119.93 LBS | RESPIRATION RATE: 20 BRPM | SYSTOLIC BLOOD PRESSURE: 148 MMHG | OXYGEN SATURATION: 100 % | HEIGHT: 64 IN | TEMPERATURE: 98 F | HEART RATE: 99 BPM

## 2024-04-30 VITALS
OXYGEN SATURATION: 97 % | TEMPERATURE: 98 F | SYSTOLIC BLOOD PRESSURE: 132 MMHG | RESPIRATION RATE: 18 BRPM | HEART RATE: 88 BPM | DIASTOLIC BLOOD PRESSURE: 89 MMHG

## 2024-04-30 LAB
ALBUMIN SERPL ELPH-MCNC: 3.3 G/DL — SIGNIFICANT CHANGE UP (ref 3.3–5)
ALP SERPL-CCNC: 96 U/L — SIGNIFICANT CHANGE UP (ref 40–120)
ALT FLD-CCNC: 46 U/L — HIGH (ref 10–45)
ANION GAP SERPL CALC-SCNC: 13 MMOL/L — SIGNIFICANT CHANGE UP (ref 5–17)
AST SERPL-CCNC: 63 U/L — HIGH (ref 10–40)
BASOPHILS # BLD AUTO: 0.1 K/UL — SIGNIFICANT CHANGE UP (ref 0–0.2)
BASOPHILS NFR BLD AUTO: 1.6 % — SIGNIFICANT CHANGE UP (ref 0–2)
BILIRUB SERPL-MCNC: 0.2 MG/DL — SIGNIFICANT CHANGE UP (ref 0.2–1.2)
BUN SERPL-MCNC: 5 MG/DL — LOW (ref 7–23)
CALCIUM SERPL-MCNC: 9.3 MG/DL — SIGNIFICANT CHANGE UP (ref 8.4–10.5)
CHLORIDE SERPL-SCNC: 104 MMOL/L — SIGNIFICANT CHANGE UP (ref 96–108)
CK MB CFR SERPL CALC: 5.2 NG/ML — SIGNIFICANT CHANGE UP (ref 0–6.7)
CK MB CFR SERPL CALC: 5.7 NG/ML — SIGNIFICANT CHANGE UP (ref 0–6.7)
CK SERPL-CCNC: 202 U/L — HIGH (ref 25–170)
CK SERPL-CCNC: 220 U/L — HIGH (ref 25–170)
CO2 SERPL-SCNC: 27 MMOL/L — SIGNIFICANT CHANGE UP (ref 22–31)
CREAT SERPL-MCNC: 0.35 MG/DL — LOW (ref 0.5–1.3)
EGFR: 118 ML/MIN/1.73M2 — SIGNIFICANT CHANGE UP
EOSINOPHIL # BLD AUTO: 0 K/UL — SIGNIFICANT CHANGE UP (ref 0–0.5)
EOSINOPHIL NFR BLD AUTO: 0 % — SIGNIFICANT CHANGE UP (ref 0–6)
GLUCOSE SERPL-MCNC: 81 MG/DL — SIGNIFICANT CHANGE UP (ref 70–99)
HCT VFR BLD CALC: 24 % — LOW (ref 34.5–45)
HGB BLD-MCNC: 7.4 G/DL — LOW (ref 11.5–15.5)
LYMPHOCYTES # BLD AUTO: 1.53 K/UL — SIGNIFICANT CHANGE UP (ref 1–3.3)
LYMPHOCYTES # BLD AUTO: 23.4 % — SIGNIFICANT CHANGE UP (ref 13–44)
MAGNESIUM SERPL-MCNC: 1.4 MG/DL — LOW (ref 1.6–2.6)
MANUAL SMEAR VERIFICATION: SIGNIFICANT CHANGE UP
MCHC RBC-ENTMCNC: 30.8 GM/DL — LOW (ref 32–36)
MCHC RBC-ENTMCNC: 34.1 PG — HIGH (ref 27–34)
MCV RBC AUTO: 110.6 FL — HIGH (ref 80–100)
MONOCYTES # BLD AUTO: 0.61 K/UL — SIGNIFICANT CHANGE UP (ref 0–0.9)
MONOCYTES NFR BLD AUTO: 9.4 % — SIGNIFICANT CHANGE UP (ref 2–14)
NEUTROPHILS # BLD AUTO: 4.28 K/UL — SIGNIFICANT CHANGE UP (ref 1.8–7.4)
NEUTROPHILS NFR BLD AUTO: 65.6 % — SIGNIFICANT CHANGE UP (ref 43–77)
NT-PROBNP SERPL-SCNC: 519 PG/ML — HIGH (ref 0–300)
OVALOCYTES BLD QL SMEAR: SLIGHT — SIGNIFICANT CHANGE UP
PHOSPHATE SERPL-MCNC: 4.1 MG/DL — SIGNIFICANT CHANGE UP (ref 2.5–4.5)
PLAT MORPH BLD: NORMAL — SIGNIFICANT CHANGE UP
PLATELET # BLD AUTO: 389 K/UL — SIGNIFICANT CHANGE UP (ref 150–400)
POLYCHROMASIA BLD QL SMEAR: SIGNIFICANT CHANGE UP
POTASSIUM SERPL-MCNC: 2.7 MMOL/L — CRITICAL LOW (ref 3.5–5.3)
POTASSIUM SERPL-SCNC: 2.7 MMOL/L — CRITICAL LOW (ref 3.5–5.3)
PROT SERPL-MCNC: 5.6 G/DL — LOW (ref 6–8.3)
RBC # BLD: 2.17 M/UL — LOW (ref 3.8–5.2)
RBC # FLD: 16.6 % — HIGH (ref 10.3–14.5)
RBC BLD AUTO: ABNORMAL
SCHISTOCYTES BLD QL AUTO: SLIGHT — SIGNIFICANT CHANGE UP
SODIUM SERPL-SCNC: 144 MMOL/L — SIGNIFICANT CHANGE UP (ref 135–145)
TROPONIN T, HIGH SENSITIVITY RESULT: 28 NG/L — SIGNIFICANT CHANGE UP (ref 0–51)
TROPONIN T, HIGH SENSITIVITY RESULT: 28 NG/L — SIGNIFICANT CHANGE UP (ref 0–51)
WBC # BLD: 6.52 K/UL — SIGNIFICANT CHANGE UP (ref 3.8–10.5)
WBC # FLD AUTO: 6.52 K/UL — SIGNIFICANT CHANGE UP (ref 3.8–10.5)

## 2024-04-30 PROCEDURE — 84484 ASSAY OF TROPONIN QUANT: CPT

## 2024-04-30 PROCEDURE — 93010 ELECTROCARDIOGRAM REPORT: CPT

## 2024-04-30 PROCEDURE — 84100 ASSAY OF PHOSPHORUS: CPT

## 2024-04-30 PROCEDURE — 71045 X-RAY EXAM CHEST 1 VIEW: CPT

## 2024-04-30 PROCEDURE — 82553 CREATINE MB FRACTION: CPT

## 2024-04-30 PROCEDURE — 36415 COLL VENOUS BLD VENIPUNCTURE: CPT

## 2024-04-30 PROCEDURE — 85025 COMPLETE CBC W/AUTO DIFF WBC: CPT

## 2024-04-30 PROCEDURE — 80053 COMPREHEN METABOLIC PANEL: CPT

## 2024-04-30 PROCEDURE — 93970 EXTREMITY STUDY: CPT | Mod: 26

## 2024-04-30 PROCEDURE — 84132 ASSAY OF SERUM POTASSIUM: CPT

## 2024-04-30 PROCEDURE — 93005 ELECTROCARDIOGRAM TRACING: CPT

## 2024-04-30 PROCEDURE — 93970 EXTREMITY STUDY: CPT

## 2024-04-30 PROCEDURE — 96374 THER/PROPH/DIAG INJ IV PUSH: CPT

## 2024-04-30 PROCEDURE — 82550 ASSAY OF CK (CPK): CPT

## 2024-04-30 PROCEDURE — 83880 ASSAY OF NATRIURETIC PEPTIDE: CPT

## 2024-04-30 PROCEDURE — 99285 EMERGENCY DEPT VISIT HI MDM: CPT | Mod: 25

## 2024-04-30 PROCEDURE — 99285 EMERGENCY DEPT VISIT HI MDM: CPT

## 2024-04-30 PROCEDURE — 83735 ASSAY OF MAGNESIUM: CPT

## 2024-04-30 PROCEDURE — 71045 X-RAY EXAM CHEST 1 VIEW: CPT | Mod: 26

## 2024-04-30 PROCEDURE — 96372 THER/PROPH/DIAG INJ SC/IM: CPT | Mod: XU

## 2024-04-30 RX ORDER — POTASSIUM CHLORIDE 20 MEQ
40 PACKET (EA) ORAL ONCE
Refills: 0 | Status: COMPLETED | OUTPATIENT
Start: 2024-04-30 | End: 2024-04-30

## 2024-04-30 RX ORDER — ACETAMINOPHEN 500 MG
1000 TABLET ORAL ONCE
Refills: 0 | Status: COMPLETED | OUTPATIENT
Start: 2024-04-30 | End: 2024-04-30

## 2024-04-30 RX ORDER — MAGNESIUM SULFATE 500 MG/ML
2 VIAL (ML) INJECTION ONCE
Refills: 0 | Status: COMPLETED | OUTPATIENT
Start: 2024-04-30 | End: 2024-04-30

## 2024-04-30 RX ORDER — MAGNESIUM OXIDE 400 MG ORAL TABLET 241.3 MG
400 TABLET ORAL ONCE
Refills: 0 | Status: COMPLETED | OUTPATIENT
Start: 2024-04-30 | End: 2024-04-30

## 2024-04-30 RX ORDER — POTASSIUM CHLORIDE 20 MEQ
10 PACKET (EA) ORAL
Refills: 0 | Status: COMPLETED | OUTPATIENT
Start: 2024-04-30 | End: 2024-04-30

## 2024-04-30 RX ORDER — KETOROLAC TROMETHAMINE 30 MG/ML
15 SYRINGE (ML) INJECTION ONCE
Refills: 0 | Status: DISCONTINUED | OUTPATIENT
Start: 2024-04-30 | End: 2024-04-30

## 2024-04-30 RX ADMIN — Medication 15 MILLIGRAM(S): at 23:35

## 2024-04-30 RX ADMIN — MAGNESIUM OXIDE 400 MG ORAL TABLET 400 MILLIGRAM(S): 241.3 TABLET ORAL at 23:08

## 2024-04-30 RX ADMIN — Medication 40 MILLIEQUIVALENT(S): at 22:26

## 2024-04-30 RX ADMIN — Medication 40 MILLIEQUIVALENT(S): at 17:26

## 2024-04-30 RX ADMIN — Medication 1000 MILLIGRAM(S): at 23:36

## 2024-04-30 NOTE — ED PROVIDER NOTE - NS ED ROS FT
positive: BLE edema, abdominal pain, nausea, diarrhea  negative: f/c/congestion/cough/hemoptysis/cp/sob/dysuria/hematuria/rash/trauma

## 2024-04-30 NOTE — ED PROVIDER NOTE - NSFOLLOWUPINSTRUCTIONS_ED_ALL_ED_FT
Please reach out to Vita Wang ([facility] ED clinical referral coordinator) to assist you with your follow-up appointment with a cardiologist and primary care doctor.    Monday - Friday 11am-7pm  [phone]  [email or web address]     1. You were seen for leg swelling. A copy of any of your resulted labs, imaging, and findings have been provided to you. Make sure to view any test results that may not have yet resulted at the time of your discharge by creating a FollowMyHealth account at: [email or web address] to sign up for FollowMyHealth. Please review all of your lab, imaging, and all other results in their entirety with your primary care doctor.   2. Continue to take your home medications as prescribed.   3. Follow up with a cardiologist and your primary care doctor within 48 hours to assess the symptoms you were seen for in the emergency department and to review all results from your visit. If you don't have a doctor, call [phone] to make an appointment.  4. Return immediately to the emergency department for new, persistent, or worsening symptoms or signs. Return immediately to the emergency department if you have chest pain, shortness of breath, loss of consciousness, chest pain, dark or bloody stool, or lightheadedness.   5. For your for health, you should make healthy food choices and be physically active. Also, you should not smoke or use drugs, and you should not drink alcohol in excess. Please visit [email or web address] for resources and more information.

## 2024-04-30 NOTE — ED ADULT NURSE REASSESSMENT NOTE - NS ED NURSE REASSESS COMMENT FT1
Pt back from US. Repeated trop sent to lab. Pt back from US. Pt refusing repeat blood work. Pt educated on the importance of new blood collection and given diversion tactics. Per Pt "I just do not want it". MD Orellana Made aware.

## 2024-04-30 NOTE — ED ADULT TRIAGE NOTE - CHIEF COMPLAINT QUOTE
Pt presents to ED here for b/l feet swelling x 1 week. Pt denies CP/SOB. Pt A&Oc4, conversive in full sentences and ambulatory.

## 2024-04-30 NOTE — ED PROVIDER NOTE - CARE PLAN
Principal Discharge DX:	Bilateral leg edema   1 Principal Discharge DX:	Bilateral leg edema  Secondary Diagnosis:	Hypokalemia  Secondary Diagnosis:	Anemia  Secondary Diagnosis:	Hypomagnesemia  Secondary Diagnosis:	Transaminitis

## 2024-04-30 NOTE — ED PROVIDER NOTE - PHYSICAL EXAMINATION
GENERAL:  Awake, alert and in NAD, non-toxic appearing  ENMT: Airway patent  EYES: conjunctiva clear  CARDIAC: Regular rate, regular rhythm.  Heart sounds S1, S2, no S3, S4. No murmurs, rubs or gallops.  RESPIRATORY: Breath sounds clear and equal in bilateral anterior lung fields, no wheezes/ronchi/crackles/stridor; pt breathing and speaking comfortably with no increased WOB, no accessory mm. use, no intercostal retractions, no nasal flaring  GI: abdomen soft, non-distended, non-tender, no rebound or guarding.  SKIN: warm and dry, no rashes  PSYCH: awake, alert, calm and cooperative  EXTREMITIES: 3+ pitting edema BLE with no overlying erythema/skin changes   BLE: 5/5 motor strength bilat hip flexors, knee flexors and extensors, plantar and dorsiflexors, hallux flexors and extensors. sensation intact to light touch bilat L3-S1; 2+ DP pulses bilat; compartment soft, skin warm and dry   NEURO: gcs 15

## 2024-04-30 NOTE — ED PROVIDER NOTE - OBJECTIVE STATEMENT
58F c PMH of HTN, etoh use, epilepsy p/w 1 week h/o BLE edema. no h/o similar sx. no hormone use. no personal/FH of DVT/PE. no recent travel/surgery/hospitalization. denies associated cp/sob/fever/rash.

## 2024-04-30 NOTE — ED PROVIDER NOTE - PATIENT PORTAL LINK FT
You can access the FollowMyHealth Patient Portal offered by [facility] by registering at the following website: [email or web address]. By joining [facility]’s FollowMyHealth portal, you will also be able to view your health information using other applications (apps) compatible with our system.

## 2024-04-30 NOTE — ED PROVIDER NOTE - CLINICAL SUMMARY MEDICAL DECISION MAKING FREE TEXT BOX
58F c PMH of HTN, etoh use, epilepsy p/w 1 week h/o BLE edema. On exam, /86, VS otherwise wnl, 3+ pitting BLE edema, exam otherwise unremarkable.    ddx: chf vs dvt vs lymphedema    Plan:  - ekg: nsr, no radha/std/twi  - Labs: cbc shows anemia 7.4/24, cmp shows K 2.7, Mg and Phos ordered, repletion ordered, mild transaminitis  - us duplex ble pending 58F c PMH of HTN, etoh use, epilepsy p/w 1 week h/o BLE edema. On exam, /86, VS otherwise wnl, 3+ pitting BLE edema, exam otherwise unremarkable.    ddx: chf vs dvt vs lymphedema    Plan:  - ekg: nsr, no radha/std/twi  - Labs: cbc shows anemia 7.4/24, prior h/h 5/5/24 similar hgb 7.9, pt denies dark or bloody stool; cmp shows K 2.7, Mg and Phos ordered, repletion ordered, mild transaminitis  - us duplex ble pending

## 2024-04-30 NOTE — ED ADULT NURSE NOTE - OBJECTIVE STATEMENT
58y female A&ox3 to ED c/o b/l lower extremity edema. Per pt "It happened suddenly". Denies chest pain, fever, sob, pain, dizziness, hx of clots. hx of htn.

## 2024-04-30 NOTE — ED ADULT NURSE NOTE - NSFALLUNIVINTERV_ED_ALL_ED
Bed/Stretcher in lowest position, wheels locked, appropriate side rails in place/Call bell, personal items and telephone in reach/Instruct patient to call for assistance before getting out of bed/chair/stretcher/Non-slip footwear applied when patient is off stretcher/[place] to call system/Physically safe environment - no spills, clutter or unnecessary equipment/Purposeful proactive rounding/Room/bathroom lighting operational, light cord in reach

## 2024-04-30 NOTE — ED PROVIDER NOTE - PROGRESS NOTE DETAILS
MD Esteban: pt given K 40 meq but couldn't tolerate iv K, K improved to 2.9, Mg 1.4. will give another K 40 meq and Mg prior to discharge. mild transamintis. bnp 519, trop 28 > 28 wnl. us ble: LEFT:  Normal compressibility of the LEFT common femoral, femoral and popliteal   veins.  Doppler examination shows normal spontaneous and phasic flow.  No LEFT calf vein thrombosis is detected.    IMPRESSION:  No evidence of deep venous thrombosis in either lower extremity.    Bilateral lower extremity soft tissue edema.    cxr: IMPRESSION:  Linear band of atelectasis and/or parenchymal scarring over the right   lower lung zone. No pneumothorax or parenchymal consolidation    on reassessment pt awake alert nad, updated about results, told pt to f/u with pmd and cardiologist and return to ed for new or worsening sx.

## 2024-05-03 DIAGNOSIS — I10 ESSENTIAL (PRIMARY) HYPERTENSION: ICD-10-CM

## 2024-05-03 DIAGNOSIS — R74.01 ELEVATION OF LEVELS OF LIVER TRANSAMINASE LEVELS: ICD-10-CM

## 2024-05-03 DIAGNOSIS — M79.89 OTHER SPECIFIED SOFT TISSUE DISORDERS: ICD-10-CM

## 2024-05-03 DIAGNOSIS — E87.6 HYPOKALEMIA: ICD-10-CM

## 2024-05-03 DIAGNOSIS — E83.42 HYPOMAGNESEMIA: ICD-10-CM

## 2024-05-03 DIAGNOSIS — R60.0 LOCALIZED EDEMA: ICD-10-CM

## 2024-05-03 DIAGNOSIS — F17.200 NICOTINE DEPENDENCE, UNSPECIFIED, UNCOMPLICATED: ICD-10-CM

## 2024-05-03 DIAGNOSIS — D64.9 ANEMIA, UNSPECIFIED: ICD-10-CM

## 2024-05-04 DIAGNOSIS — G40.909 EPILEPSY, UNSPECIFIED, NOT INTRACTABLE, WITHOUT STATUS EPILEPTICUS: ICD-10-CM

## 2024-05-07 ENCOUNTER — APPOINTMENT (OUTPATIENT)
Dept: NEUROLOGY | Facility: CLINIC | Age: 59
End: 2024-05-07

## 2024-05-14 ENCOUNTER — INPATIENT (INPATIENT)
Facility: HOSPITAL | Age: 59
LOS: 1 days | Discharge: HOME | DRG: 606 | End: 2024-05-16
Attending: STUDENT IN AN ORGANIZED HEALTH CARE EDUCATION/TRAINING PROGRAM | Admitting: STUDENT IN AN ORGANIZED HEALTH CARE EDUCATION/TRAINING PROGRAM
Payer: COMMERCIAL

## 2024-05-14 VITALS
HEART RATE: 109 BPM | HEIGHT: 64 IN | TEMPERATURE: 98 F | SYSTOLIC BLOOD PRESSURE: 149 MMHG | OXYGEN SATURATION: 95 % | RESPIRATION RATE: 16 BRPM | WEIGHT: 130.07 LBS | DIASTOLIC BLOOD PRESSURE: 68 MMHG

## 2024-05-14 DIAGNOSIS — D64.9 ANEMIA, UNSPECIFIED: ICD-10-CM

## 2024-05-14 DIAGNOSIS — Z87.898 PERSONAL HISTORY OF OTHER SPECIFIED CONDITIONS: ICD-10-CM

## 2024-05-14 DIAGNOSIS — I10 ESSENTIAL (PRIMARY) HYPERTENSION: ICD-10-CM

## 2024-05-14 DIAGNOSIS — J96.01 ACUTE RESPIRATORY FAILURE WITH HYPOXIA: ICD-10-CM

## 2024-05-14 DIAGNOSIS — E83.42 HYPOMAGNESEMIA: ICD-10-CM

## 2024-05-14 DIAGNOSIS — D53.9 NUTRITIONAL ANEMIA, UNSPECIFIED: ICD-10-CM

## 2024-05-14 DIAGNOSIS — Z29.9 ENCOUNTER FOR PROPHYLACTIC MEASURES, UNSPECIFIED: ICD-10-CM

## 2024-05-14 DIAGNOSIS — R09.89 OTHER SPECIFIED SYMPTOMS AND SIGNS INVOLVING THE CIRCULATORY AND RESPIRATORY SYSTEMS: ICD-10-CM

## 2024-05-14 DIAGNOSIS — R60.0 LOCALIZED EDEMA: ICD-10-CM

## 2024-05-14 LAB
ADD ON TEST-SPECIMEN IN LAB: SIGNIFICANT CHANGE UP
ALBUMIN SERPL ELPH-MCNC: 3.3 G/DL — SIGNIFICANT CHANGE UP (ref 3.3–5)
ALP SERPL-CCNC: 105 U/L — SIGNIFICANT CHANGE UP (ref 40–120)
ALT FLD-CCNC: 31 U/L — SIGNIFICANT CHANGE UP (ref 10–45)
ANION GAP SERPL CALC-SCNC: 14 MMOL/L — SIGNIFICANT CHANGE UP (ref 5–17)
ANISOCYTOSIS BLD QL: SIGNIFICANT CHANGE UP
AST SERPL-CCNC: 59 U/L — HIGH (ref 10–40)
BASOPHILS # BLD AUTO: 0.19 K/UL — SIGNIFICANT CHANGE UP (ref 0–0.2)
BASOPHILS NFR BLD AUTO: 2.6 % — HIGH (ref 0–2)
BILIRUB SERPL-MCNC: <0.2 MG/DL — SIGNIFICANT CHANGE UP (ref 0.2–1.2)
BLD GP AB SCN SERPL QL: NEGATIVE — SIGNIFICANT CHANGE UP
BUN SERPL-MCNC: 4 MG/DL — LOW (ref 7–23)
CALCIUM SERPL-MCNC: 8.8 MG/DL — SIGNIFICANT CHANGE UP (ref 8.4–10.5)
CHLORIDE SERPL-SCNC: 105 MMOL/L — SIGNIFICANT CHANGE UP (ref 96–108)
CO2 SERPL-SCNC: 23 MMOL/L — SIGNIFICANT CHANGE UP (ref 22–31)
CREAT SERPL-MCNC: 0.46 MG/DL — LOW (ref 0.5–1.3)
EGFR: 111 ML/MIN/1.73M2 — SIGNIFICANT CHANGE UP
EOSINOPHIL # BLD AUTO: 0 K/UL — SIGNIFICANT CHANGE UP (ref 0–0.5)
EOSINOPHIL NFR BLD AUTO: 0 % — SIGNIFICANT CHANGE UP (ref 0–6)
ETHANOL SERPL-MCNC: 36 MG/DL — HIGH (ref 0–10)
GLUCOSE SERPL-MCNC: 98 MG/DL — SIGNIFICANT CHANGE UP (ref 70–99)
HCT VFR BLD CALC: 22.4 % — LOW (ref 34.5–45)
HGB BLD-MCNC: 6.8 G/DL — CRITICAL LOW (ref 11.5–15.5)
HYPOCHROMIA BLD QL: SLIGHT — SIGNIFICANT CHANGE UP
LYMPHOCYTES # BLD AUTO: 0.9 K/UL — LOW (ref 1–3.3)
LYMPHOCYTES # BLD AUTO: 12.3 % — LOW (ref 13–44)
MACROCYTES BLD QL: SIGNIFICANT CHANGE UP
MAGNESIUM SERPL-MCNC: 1.5 MG/DL — LOW (ref 1.6–2.6)
MANUAL SMEAR VERIFICATION: SIGNIFICANT CHANGE UP
MCHC RBC-ENTMCNC: 30.4 GM/DL — LOW (ref 32–36)
MCHC RBC-ENTMCNC: 32.7 PG — SIGNIFICANT CHANGE UP (ref 27–34)
MCV RBC AUTO: 107.7 FL — HIGH (ref 80–100)
MONOCYTES # BLD AUTO: 0.64 K/UL — SIGNIFICANT CHANGE UP (ref 0–0.9)
MONOCYTES NFR BLD AUTO: 8.8 % — SIGNIFICANT CHANGE UP (ref 2–14)
NEUTROPHILS # BLD AUTO: 5.57 K/UL — SIGNIFICANT CHANGE UP (ref 1.8–7.4)
NEUTROPHILS NFR BLD AUTO: 75.4 % — SIGNIFICANT CHANGE UP (ref 43–77)
NEUTS BAND # BLD: 0.9 % — SIGNIFICANT CHANGE UP (ref 0–8)
NT-PROBNP SERPL-SCNC: 933 PG/ML — HIGH (ref 0–300)
OVALOCYTES BLD QL SMEAR: SLIGHT — SIGNIFICANT CHANGE UP
PHOSPHATE SERPL-MCNC: 3.3 MG/DL — SIGNIFICANT CHANGE UP (ref 2.5–4.5)
PLAT MORPH BLD: NORMAL — SIGNIFICANT CHANGE UP
PLATELET # BLD AUTO: 411 K/UL — HIGH (ref 150–400)
POIKILOCYTOSIS BLD QL AUTO: SLIGHT — SIGNIFICANT CHANGE UP
POLYCHROMASIA BLD QL SMEAR: SLIGHT — SIGNIFICANT CHANGE UP
POTASSIUM SERPL-MCNC: 3.6 MMOL/L — SIGNIFICANT CHANGE UP (ref 3.5–5.3)
POTASSIUM SERPL-SCNC: 3.6 MMOL/L — SIGNIFICANT CHANGE UP (ref 3.5–5.3)
PROT SERPL-MCNC: 5.9 G/DL — LOW (ref 6–8.3)
RBC # BLD: 2.08 M/UL — LOW (ref 3.8–5.2)
RBC # FLD: 15.8 % — HIGH (ref 10.3–14.5)
RBC BLD AUTO: ABNORMAL
RH IG SCN BLD-IMP: POSITIVE — SIGNIFICANT CHANGE UP
SODIUM SERPL-SCNC: 142 MMOL/L — SIGNIFICANT CHANGE UP (ref 135–145)
WBC # BLD: 7.3 K/UL — SIGNIFICANT CHANGE UP (ref 3.8–10.5)
WBC # FLD AUTO: 7.3 K/UL — SIGNIFICANT CHANGE UP (ref 3.8–10.5)

## 2024-05-14 PROCEDURE — 93010 ELECTROCARDIOGRAM REPORT: CPT

## 2024-05-14 PROCEDURE — 99285 EMERGENCY DEPT VISIT HI MDM: CPT

## 2024-05-14 PROCEDURE — 71045 X-RAY EXAM CHEST 1 VIEW: CPT | Mod: 26

## 2024-05-14 PROCEDURE — 99223 1ST HOSP IP/OBS HIGH 75: CPT

## 2024-05-14 RX ORDER — FUROSEMIDE 40 MG
40 TABLET ORAL ONCE
Refills: 0 | Status: COMPLETED | OUTPATIENT
Start: 2024-05-14 | End: 2024-05-14

## 2024-05-14 RX ORDER — LEVETIRACETAM 250 MG/1
500 TABLET, FILM COATED ORAL EVERY 12 HOURS
Refills: 0 | Status: DISCONTINUED | OUTPATIENT
Start: 2024-05-14 | End: 2024-05-15

## 2024-05-14 RX ADMIN — LEVETIRACETAM 500 MILLIGRAM(S): 250 TABLET, FILM COATED ORAL at 22:35

## 2024-05-14 NOTE — ED PROVIDER NOTE - PHYSICAL EXAMINATION
GEN: Somewhat unkempt, chronically ill appearing, well developed, awake, alert, oriented to person, place, time/situation, NTAF  ENT: Airway patent, Nasal mucosa clear. Mouth with normal mucosa.  EYES: Clear bilaterally. PERRL, EOMI  RESPIRATORY: Breathing comfortably with normal RR. Bibasilar crackles, noted to be satting 89% on RA, improved with NC.   CARDIAC: Regular rate and rhythm, no M/R/G  ABDOMEN: Soft, nontender, +bowel sounds, no rebound, rigidity, or guarding.  MSK: +3 pitting edema b/l, distal pulses present, otherwise Range of motion is not limited, no deformities noted.  NEURO: Alert and oriented, no focal deficits.  SKIN: Skin normal color for race, warm, dry and intact. No evidence of rash.  PSYCH: Alert and oriented to person, place, time/situation. normal mood and affect. no apparent risk to self or others.

## 2024-05-14 NOTE — ED ADULT TRIAGE NOTE - CHIEF COMPLAINT QUOTE
Bilateral foot swelling x 1 week. Denies fevers or chills. Pt alert, oriented, and ambulatory at time of triage. Denies N/V/D.

## 2024-05-14 NOTE — ED PROVIDER NOTE - CLINICAL SUMMARY MEDICAL DECISION MAKING FREE TEXT BOX
58F PMH HTN, alcohol use disorder, seizures, who presents ambulatory to the ER for worsening b/l LE edema. Pt was seen in the ER 2 weeks ago for the same and had b/l LE dopplers that were neg for DVT. Pt states her leg are painful and swollen, she has not been able to elevate her legs at home, admits to drinking "1-2 glasses of wine" daily. Unclear if she has been taking her medications, denies recently seizures, denies cp/sob, n/v, f/c or other complaints.   Vs notable for hypoxia on RA to 89%, improved with 2L NC. Afebrile.   Pt appears volume overloaded with b/l LE edema, bibasilar rales and +JVD.   Plan for labs including bnp, r/o chf, r/o electrolyte abnormality CXR, r/o chf, r/o pna, repeat dopplers not indicated at this time bc patient had this 2 weeks ago (no dvt).   Pt will likely require admission 58F PMH HTN, alcohol use disorder, seizures, who presents ambulatory to the ER for worsening b/l LE edema. Pt was seen in the ER 2 weeks ago for the same and had b/l LE dopplers that were neg for DVT. Pt states her leg are painful and swollen, she has not been able to elevate her legs at home, admits to drinking "1-2 glasses of wine" daily. Unclear if she has been taking her medications, denies recently seizures, denies cp/sob, n/v, f/c or other complaints. No bleeding, no black or bloody stool.   Vs notable for hypoxia on RA to 89%, improved with 2L NC. Afebrile.   Pt appears volume overloaded with b/l LE edema, bibasilar rales and +JVD.   Plan for labs including bnp, r/o chf, r/o electrolyte abnormality CXR, r/o chf, r/o pna, repeat dopplers not indicated at this time bc patient had this 2 weeks ago (no dvt).   Pt will likely require admission

## 2024-05-14 NOTE — H&P ADULT - ASSESSMENT
58 F PMH HTN, alcohol use disorder, seizures, presenting for worsening lower extremity edema found to be hypoxic presumably to volume overload and worsening heart function as well as anemia requiring one unit of prbc's.  58 F PMH HTN, alcohol use disorder, seizures, presenting for worsening lower extremity edema found to be hypoxic secondarily to volume overload and anemic requiring one unit of prbc's.

## 2024-05-14 NOTE — ED PROVIDER NOTE - CARE PLAN
Principal Discharge DX:	Peripheral edema   1 Principal Discharge DX:	Peripheral edema  Secondary Diagnosis:	Anemia

## 2024-05-14 NOTE — H&P ADULT - HISTORY OF PRESENT ILLNESS
58 F PMH HTN, alcohol use disorder, seizures, presenting for worsening lower extremity edema found to be hypoxic and anemic.      Patient was recently admitted briefly in March for metabolic encephalopathy in the setting of possible seizures secondarily to poor medication compliance as well as electrolyte deficiencies iso poor PO intake. Was admitted to the MICU for seizures secondarily to alcohol withdrawal in September of 2023. In July of 2022 admitted for alcohol withdrawal.     In the ED:  Initial vital signs: T: XX F, HR: XX, BP: XX, R: XX, SpO2: XX% on RA  Labs: significant for  Imaging:  CXR:   EKG:   Medications:   Consults: none  58 F PMH HTN, alcohol use disorder, seizures, presenting for worsening lower extremity edema found to be hypoxic and anemic requiring one unit of prbc's. Patient is not a great historian but states that worsening swelling of her lower extremities as well as increasing pain is what ultimately brought her into the hospital. Her partner, Garret, was contacted as well and he had stated that she was at dentist office for a dental appointment but secondarily to complaints of swelling and pain, the dentist told her to go to the ED. She was recently in our ED at end of April with same concern, lower extremity dopplers were performed with no evidence of DVT's and echo performed with EF of 63%. Was told to go to cardiologist but patient states that she never did. Denies orthopnea, increased sob on activity. Does not elevate legs and does not believe she took any pain medications to help relieve the pain. With regards     Patient was recently admitted briefly in March for metabolic encephalopathy in the setting of possible seizures secondarily to poor medication compliance as well as electrolyte deficiencies iso poor PO intake. Was admitted to the MICU for seizures secondarily to alcohol withdrawal in September of 2023. In July of 2022 admitted for alcohol withdrawal.     In the ED:  Initial vital signs: T: XX F, HR: XX, BP: XX, R: XX, SpO2: XX% on RA  Labs: significant for  Imaging:  CXR:   EKG:   Medications:   Consults: none  58 F PMH HTN, alcohol use disorder, seizures, presenting for worsening lower extremity edema found to be hypoxic and anemic requiring one unit of prbc's. Patient is not a great historian but states that worsening swelling of her lower extremities as well as increasing pain is what ultimately brought her into the hospital. Her partner, Garret, was contacted as well and he had stated that she was at dentist office for a dental appointment but secondarily to complaints of swelling and pain, the dentist told her to go to the ED. She was recently in our ED at end of April with same concern, lower extremity dopplers were performed with no evidence of DVT's and echo performed with EF of 63%. Was told to go to cardiologist but patient states that she never did. Denies orthopnea, increased sob on activity. Does not elevate legs and does not believe she took any pain medications to help relieve the pain. Has never required supplemental oxygen before. With regards to her anemia,  denies any hemoptysis, melena, brbpr, hematuria. Has poor dietary habits according to her and her partner. She does not remember if she had any prior transfusions in the past. With regards to alcohol use, patient states that she only drinks one or two glasses of vodka or wine per night while her partner states that he believes she is finished with 2 bottles of wine and vodka after two nights. Last drink was last night per patient. Is not aware of prior alcohol withdrawal but partner suggests that she has history. States to be following with neurologist outpatient with regards to her seizures, is prescribed Keppra 500 mg BID but states she normally only takes one a day. Last seizure per her partner was 2 months ago but patient does not remember. Her partner states that seizures began over two years ago when she had a CVA.     Patient was also recently admitted briefly in March for metabolic encephalopathy in the setting of possible seizures secondarily to poor medication compliance as well as electrolyte deficiencies iso poor PO intake. Was admitted to the MICU for seizures secondarily to alcohol withdrawal in September of 2023. In July of 2022 admitted for alcohol withdrawal.     In the ED:  Initial vital signs: T: XX F, HR: XX, BP: XX, R: XX, SpO2: XX% on RA  Labs: significant for  Imaging:  CXR:   EKG:   Medications:   Consults: none  58 F PMH HTN, alcohol use disorder, seizures, presenting for worsening lower extremity edema found to be hypoxic and anemic requiring one unit of prbc's. Patient is not a great historian but states that worsening swelling of her lower extremities as well as increasing pain is what ultimately brought her into the hospital. Her partner, Garret, was contacted as well and he had stated that she was at dentist office for a dental appointment but secondarily to complaints of swelling and pain, the dentist told her to go to the ED. She was recently in our ED at end of April with same concern, lower extremity dopplers were performed with no evidence of DVT's and echo performed with EF of 63%. Was told to go to cardiologist but patient states that she never did. Denies orthopnea, increased sob on activity. Does not elevate legs and does not believe she took any pain medications to help relieve the pain. Has never required supplemental oxygen before. With regards to her anemia,  denies any hemoptysis, melena, brbpr, hematuria. Has poor dietary habits according to her and her partner. She does not remember if she had any prior transfusions in the past. With regards to alcohol use, patient states that she only drinks one or two glasses of vodka or wine per night while her partner states that he believes she is finished with 2 bottles of wine and vodka after two nights. Last drink was last night per patient. Is not aware of prior alcohol withdrawal but partner suggests that she has history. States to be following with neurologist outpatient with regards to her seizures, is prescribed Keppra 500 mg BID but states she normally only takes one a day. Last seizure per her partner was 2 months ago but patient does not remember. Her partner states that seizures began over two years ago when she had a CVA.     Patient was also recently admitted briefly in March for metabolic encephalopathy in the setting of possible seizures secondarily to poor medication compliance as well as electrolyte deficiencies iso poor PO intake. Was admitted to the MICU for seizures secondarily to alcohol withdrawal in September of 2023. In July of 2022 admitted for alcohol withdrawal.     In the ED:  Initial vital signs: T: 98.3 F, HR: 109, BP: 149/68, R: 16, SpO2: 95 -> 85% on RA  Labs: significant for hgb of 6.8, MCV of 107.7, magnesium of 1.5, bnp of 933.  Imaging: None  CXR: Vascualr congestion and pulmonary edema  EKG: NSR  Medications: Keppra 500 mg x1  Consults: none  58 F PMH HTN, alcohol use disorder, seizures, presenting for worsening lower extremity edema found to be hypoxic and anemic requiring one unit of prbc's. Patient is not a great historian but states that worsening swelling of her lower extremities as well as increasing pain is what ultimately brought her into the hospital. Her partner, Garret, was contacted as well and he had stated that she was at dentist office for a dental appointment but secondarily to complaints of swelling and pain, the dentist told her to go to the ED. She was recently in our ED at end of April with same concern, lower extremity dopplers were performed with no evidence of DVT's and echo performed with EF of 63%. Was told to go to cardiologist but patient states that she never did. Denies orthopnea, increased sob on activity. Does not elevate legs and does not believe she took any pain medications to help relieve the pain. Has never required supplemental oxygen before. With regards to her anemia,  denies any hemoptysis, melena, brbpr, hematuria. Has poor dietary habits according to her and her partner. She does not remember if she had any prior transfusions in the past. With regards to alcohol use, patient states that she only drinks one or two glasses of vodka or wine per night while her partner states that he believes she is finished with 2 bottles of wine and vodka after two nights. Last drink was last night per patient. Is not aware of prior alcohol withdrawal but partner suggests that she has history. States to be following with neurologist outpatient with regards to her seizures, is prescribed Keppra 250 in the AM and  500 mg in the PM but states she normally only takes 500 mg once a day. Last seizure per her partner was 2 months ago but patient does not remember. Her partner states that seizures began over two years ago when she had a CVA.     Patient was also recently admitted briefly in March for metabolic encephalopathy in the setting of possible seizures secondarily to poor medication compliance as well as electrolyte deficiencies iso poor PO intake. Was admitted to the MICU for seizures secondarily to alcohol withdrawal in September of 2023. In July of 2022 admitted for alcohol withdrawal.     In the ED:  Initial vital signs: T: 98.3 F, HR: 109, BP: 149/68, R: 16, SpO2: 95 -> 85% on RA  Labs: significant for hgb of 6.8, MCV of 107.7, magnesium of 1.5, bnp of 933.  Imaging: None  CXR: Vascualr congestion and pulmonary edema  EKG: NSR  Medications: Keppra 500 mg x1  Consults: none

## 2024-05-14 NOTE — ED PROVIDER NOTE - OBJECTIVE STATEMENT
58F PMH HTN, alcohol use disorder, seizures, who presents ambulatory to the ER for worsening b/l LE edema. Pt was seen in the ER 2 weeks ago for the same and had b/l LE dopplers that were neg for DVT. Pt states her leg are painful and swollen, she has not been able to elevate her legs at home, admits to drinking "1-2 glasses of wine" daily. Unclear if she has been taking her medications, denies recently seizures, denies cp/sob, n/v, f/c or other complaints. 58F PMH HTN, alcohol use disorder, seizures, who presents ambulatory to the ER for worsening b/l LE edema. Pt was seen in the ER 2 weeks ago for the same and had b/l LE dopplers that were neg for DVT. Pt states her leg are painful and swollen, she has not been able to elevate her legs at home, admits to drinking "1-2 glasses of wine" daily. Unclear if she has been taking her medications, denies recently seizures, denies cp/sob, n/v, f/c or other complaints. No bleeding, no black or bloody stool.

## 2024-05-14 NOTE — H&P ADULT - ATTENDING COMMENTS
57 yo F with PMHx etOH use, HTN, seizure disorder, recent admission for metabolic encephalopathy 2/2 suspected seizure (3/4-3/5) px from home with 1-week hx of bilateral LE edema found to have evidence of volume overload/pulmonary edema on labs/imaging/exam with ED course c/b downtrending Hb of unclear etiology admitted for further evaluation and management.      Initial px complaint of BLE edema. Seen in ED for similar complaint 4/30 during which time DVT study negative and pt discharged. Normal TTE 3/2024. Current VS reviewed. HR>90. SpO2 85% on RA, improved on 2L NC.  Given hypoxia, LE edema, CXR with pulmonary edema, and +JVD , concern for volume overload component. Additional labs remarkable for Hb 6.8 - overall downtrending from 11.7 in 3/2024. Denies bleeding complaints (hematemesis, hematochezia, melena).     [ ] Wean O2 as tolerated    [ ] Repeat TTE (c/f CHF exacerbation)    [ ] Lasix 40mg IVP x1 -> Strict UOP    [ ] PPI IV Q12    [ ] Iron studies, B12, Folate    [ ] 1U pRBC transfusion ordered in ED -> Post-transfusion CBC   [ ] Hold AC/chemical DVT ppx   [ ] CIWA protocol + Ativan PRN for CIWA >8   [ ] Fall/Seizure/Aspiration precautions

## 2024-05-14 NOTE — ED ADULT NURSE REASSESSMENT NOTE - NS ED NURSE REASSESS COMMENT FT1
RN attempted to insert PIV. RN was unsuccessful in attempt. Pt requesting "we can try again later, but not any time soon." MD Partida made aware.

## 2024-05-14 NOTE — H&P ADULT - SOCIAL HISTORY: TOBACCO USE
Previously smoked 1/2 ppd but unable to quantify duration of smoking period Patient continues to smoke close 1 ppd for at least last 16 years according to patient and partner

## 2024-05-14 NOTE — ED ADULT NURSE NOTE - PAIN: BODY LOCATION
Vital Signs Last 24 Hrs  T(C): 36.3 (30 May 2020 07:34), Max: 36.3 (30 May 2020 07:34)  T(F): 97.3 (30 May 2020 07:34), Max: 97.3 (30 May 2020 07:34)  HR: --  BP: --  BP(mean): --  RR: 18 (30 May 2020 07:34) (18 - 18)  SpO2: 100% (30 May 2020 07:34) (100% - 100%) bilateral legs

## 2024-05-14 NOTE — ED ADULT NURSE NOTE - OBJECTIVE STATEMENT
Pt presents to ED with complaints of bilateral lower extremity swelling x weeks. Pt seen at ED 2 weeks prior for same complaint. Pt reports pain to both bilateral lower extremities. Reports difficulty ambulating due to pain. Pt reports drinking "1-2 glasses of wine at night." Denies chest pain, shortness of breath, fevers/chills, nausea/vomiting, diarrhea, numbness/tingling, hallucinations, abdominal pain.

## 2024-05-14 NOTE — H&P ADULT - PROBLEM SELECTOR PLAN 7
Unclear if pt is on home meds for HTN as she cannot recall, no antihypertensive meds in surescripts. In 09/2023 pt was discharged on clonidine 0.1mg BID and hydral 25mg QID.    Plan:   - Unclear if pt is on home meds for HTN as she cannot recall, no antihypertensive meds in surescripts. In 09/2023 pt was discharged on clonidine 0.1mg BID and hydral 25mg QID.    Plan:   - Follow up med rec in AM ([facility] [address]) Unclear if pt is on home meds for HTN as she cannot recall, no antihypertensive meds in surescripts. In 09/2023 pt was discharged on clonidine 0.1mg BID and hydral 25mg QID.    Plan:   - Follow up complete med rec in AM ([facility] [address])

## 2024-05-14 NOTE — H&P ADULT - PROBLEM SELECTOR PLAN 3
Patient wit hgb of 6.8 on admission with MCV of 110. Most likely in the setting of poor oral intake and compliance with vitamin supplementation. No active source of bleed. Hemodynamically stable.   S/p 1U prbc    Plan:   - F/u CBC in AM  - Folic acid supplementation provided  - F/u B12 and Folic Acid   - Active Type and Screen  - Transfuse if hgb <7 Patient wit hgb of 6.8 on admission with MCV of 110. Most likely in the setting of poor oral intake and compliance with vitamin supplementation. No active source of bleed. Hemodynamically stable.   S/p 1U prbc    Plan:   - F/u CBC in AM  - Folic acid supplementation provided  - F/u Iron labs   - F/u B12 and Folic Acid   - Active Type and Screen  - Transfuse if hgb <7 Patient wit hgb of 6.8 on admission with MCV of 107. No active source of bleed. Possibly in the setting of poor oral intake and compliance with vitamin supplementation but hgb downtrended from 11 since March of this year. Hemodynamically stable.   S/p 1U prbc    Plan:   - F/u CBC in AM  - Folic acid supplementation provided  - F/u Iron labs w/ concern for bleed  - PPI IV BID w/ unknown   - F/u B12 and Folic Acid   - Active Type and Screen  - Transfuse if hgb <7 Patient wit hgb of 6.8 on admission with MCV of 107. No active source of bleed. Possibly in the setting of poor oral intake and compliance with vitamin supplementation but hgb downtrended from 11 since March of this year. Hemodynamically stable.   S/p 1U prbc    Plan:   - F/u CBC in AM  - Folic acid supplementation provided  - F/u Iron labs w/ concern for bleed  - PPI IV BID w/ concern over sig drop of Hgb from earlier this year (11.7 in March)  - F/u B12 and Folic Acid   - Active Type and Screen  - Transfuse if hgb <7

## 2024-05-14 NOTE — H&P ADULT - NSHPLABSRESULTS_GEN_ALL_CORE
6.8    7.30  )-----------( 411      ( 14 May 2024 18:43 )             22.4       05-14    142  |  105  |  4<L>  ----------------------------<  98  3.6   |  23  |  0.46<L>    Ca    8.8      14 May 2024 18:43  Phos  3.3     05-14  Mg     1.5     05-14    TPro  5.9<L>  /  Alb  3.3  /  TBili  <0.2  /  DBili  x   /  AST  59<H>  /  ALT  31  /  AlkPhos  105  05-14              Urinalysis Basic - ( 14 May 2024 18:43 )    Color: x / Appearance: x / SG: x / pH: x  Gluc: 98 mg/dL / Ketone: x  / Bili: x / Urobili: x   Blood: x / Protein: x / Nitrite: x   Leuk Esterase: x / RBC: x / WBC x   Sq Epi: x / Non Sq Epi: x / Bacteria: x            Lactate Trend            CAPILLARY BLOOD GLUCOSE

## 2024-05-14 NOTE — H&P ADULT - PROBLEM SELECTOR PLAN 6
Patient with history of alcohol use. 1-2 drinks per night. Hx of alcohol withdrawal with seizures, admitted to MICU in September 2023. Continues to drink, blood alcohol level 39 on admission.     Plan:  - CIWA precautions  - Thiamine   - Folic acid  - Multivitamin Patient with history of alcohol use. 1-2 drinks per night. Hx of alcohol withdrawal with seizures, admitted to MICU in September 2023. Continues to drink, blood alcohol level 39 on admission.     Plan:  - CIWA   - Thiamine   - Folic acid  - Multivitamin Patient with history of alcohol use. 1-2 drinks per night. Hx of alcohol withdrawal with seizures, admitted to MICU in September 2023. Continues to drink, blood alcohol level 39 on admission.     Plan:  - CIWA protocol with ativan prn for CIWA >8  - Thiamine   - Folic acid  - Multivitamin

## 2024-05-14 NOTE — ED ADULT NURSE NOTE - NSFALLRISKINTERV_ED_ALL_ED
Assistance OOB with selected safe patient handling equipment if applicable/Assistance with ambulation/Communicate fall risk and risk factors to all staff, patient, and family/Monitor gait and stability/Provide visual cue: yellow wristband, yellow gown, etc/Reinforce activity limits and safety measures with patient and family/Call bell, personal items and telephone in reach/Instruct patient to call for assistance before getting out of bed/chair/stretcher/Non-slip footwear applied when patient is off stretcher/[place] to call system/Physically safe environment - no spills, clutter or unnecessary equipment/Purposeful Proactive Rounding/Room/bathroom lighting operational, light cord in reach

## 2024-05-14 NOTE — H&P ADULT - PROBLEM SELECTOR PLAN 2
Worsening lower extremity per patient. Was in ED on April 30th for same complaints but apparently worsening now. Concern for HF given b/l nature of edema, congestion in cxr and elevated BNP vs lymphedema vs venous stasis vs b/l DVT's.     Plan:   - F/u echo   - F/u lower extremity dopplers Worsening lower extremity per patient. Was in ED on April 30th for same complaints but apparently worsening now. Concern for HF given b/l nature of edema, congestion in cxr and elevated BNP vs lymphedema vs venous stasis vs b/l DVT's.     Plan:   - F/u echo   - 40 mg IV lasix Worsening lower extremity per patient. Was in ED on April 30th for same complaints but apparently worsening now. Concern for HF given b/l nature of edema, congestion in cxr and elevated BNP vs lymphedema vs venous stasis vs b/l DVT's.     Plan:   - F/u echo   - 40 mg IV lasix  - Hold off on LE doppler given normalcy two weeks ago Worsening lower extremity per patient. Was in ED on April 30th for same complaints but apparently worsening now. Concern for HF given b/l nature of edema, congestion in cxr and elevated BNP vs lymphedema vs venous stasis vs b/l DVT's.     Plan:   - 40 mg IV lasix  - F/u echo   - Hold off on LE doppler given normalcy two weeks ago

## 2024-05-14 NOTE — PATIENT PROFILE ADULT - FUNCTIONAL ASSESSMENT - BASIC MOBILITY 6.
2-calculated by average/Not able to assess (calculate score using [facility] averaging method)

## 2024-05-14 NOTE — H&P ADULT - NSHPREVIEWOFSYSTEMS_GEN_ALL_CORE
REVIEW OF SYSTEMS:  CONSTITUTIONAL: No weakness, fevers, or chills  EYES/ENT: No visual changes; No vertigo, throat pain, or dysphagia  NECK: No pain or stiffness  RESPIRATORY: No cough, wheezing, hemoptysis, or shortness of breath  CARDIOVASCULAR: No chest pain or palpitations  GASTROINTESTINAL: No abdominal or epigastric pain. No nausea, vomiting, or hematemesis; No diarrhea or constipation. No melena or hematochezia.  GENITOURINARY: No dysuria, frequency, or hematuria  MUSCULOSKELETAL: No joint or muscle pain or aches  NEUROLOGICAL: No numbness or weakness  SKIN: No itching or rashes REVIEW OF SYSTEMS:  CONSTITUTIONAL: No weakness, fevers, or chills  EYES/ENT: No visual changes; No vertigo, throat pain, or dysphagia  NECK: No pain or stiffness  RESPIRATORY: No cough, wheezing, hemoptysis, or shortness of breath  CARDIOVASCULAR: No chest pain or palpitations  GASTROINTESTINAL: No abdominal or epigastric pain. No nausea, vomiting, or hematemesis; No diarrhea or constipation. No melena or hematochezia.  GENITOURINARY: No dysuria, frequency, or hematuria  MUSCULOSKELETAL: No joint or muscle pain or aches  NEUROLOGICAL: Admits to moderate   SKIN: No itching or rashes REVIEW OF SYSTEMS:  CONSTITUTIONAL: No weakness, fevers, or chills  EYES/ENT: No visual changes; No vertigo, throat pain, or dysphagia  NECK: No pain or stiffness  RESPIRATORY: No cough, wheezing, hemoptysis, or shortness of breath  CARDIOVASCULAR: No chest pain or palpitations.  GASTROINTESTINAL: No abdominal or epigastric pain. No nausea, vomiting, or hematemesis; No diarrhea or constipation. No melena or hematochezia.  GENITOURINARY: No dysuria, frequency, or hematuria  MUSCULOSKELETAL: No joint or muscle pain or aches. Complaining of lower extremity edema and pain.   NEUROLOGICAL: No numbness or weakness  SKIN: No itching or rashes

## 2024-05-14 NOTE — H&P ADULT - PROBLEM SELECTOR PLAN 1
Patient requiring 2L NC while breathing at 89% on RA. Chest xray evidencing increased vasuclar congestion and edema comparably to most recent chest xray. BNP elevated to 931 Patient requiring 2L NC while breathing at 89% on RA. Chest xray evidencing increased vasuclar congestion and edema comparably to most recent chest xray. BNP elevated to 931. LE edema apparent b/l.  s/p Lasix 40 mg IV x1    Plan:   - C/w supplemental oxygen as needed, wean as tolerated  - F/u echo  - F/u LE dopplers b/l   - F/u repeat chest xray in AM Patient requiring 2L NC while breathing at 89% on RA. Chest xray evidencing increased vasuclar congestion and edema comparably to most recent chest xray. BNP elevated to 931. LE edema apparent b/l.      Plan:   - Lasix 40 mg IV x1  - C/w supplemental oxygen as needed, wean as tolerated  - F/u repeat echo given worsening symptoms   - Strict urinary output Patient requiring 2L NC while breathing at 89% on RA. Chest xray evidencing increased vascular congestion and edema comparably to most recent chest xray. BNP elevated to 933. LE edema apparent b/l. Echo obtained at last visit evidencing EF of 63%.      Plan:   - Lasix 40 mg IV x1  - C/w supplemental oxygen as needed, wean as tolerated  - F/u repeat echo given worsening symptoms   - Strict urinary output Patient requiring 2L NC while breathing at 89% on RA. Chest xray evidencing increased vascular congestion and edema comparably to most recent chest xray. BNP elevated to 933. LE edema apparent b/l. Echo obtained at last visit evidencing EF of 63%.      Plan:   - Lasix 40 mg IV x1  - F/u repeat echo given worsening symptoms   - C/w supplemental oxygen as needed, wean as tolerated  - Strict urinary output

## 2024-05-14 NOTE — H&P ADULT - PROBLEM SELECTOR PLAN 5
Patient with history of seizures, poor compliance with outpatient medications. Has had seizures in past secondarily to alcohol withdrawal vs poor compliance with medications. Last time she visited neurologist ws in 2022. Prescribed Keppra 500 mg BID.    Plan:   - C/w keppra 500 mg BID Patient with history of seizures, poor compliance with outpatient medications. Has had seizures in past secondarily to alcohol withdrawal vs poor compliance with medications. Last time she visited neurologist ws in 2022. Prescribed Keppra 500 mg BID.    Plan:   - C/w keppra 250 mg AM and 500 mg PM per recent Neurology recs on HIE Patient with history of seizures, poor compliance with outpatient medications. Has had seizures in past secondarily to alcohol withdrawal vs poor compliance with medications. Last time she visited neurologist ws in 2022. Prescribed Keppra 500 mg BID.    Plan:   - C/w keppra 250 mg AM and 500 mg PM per recent Neurology recs on HIE  - Seizure precautions  - Fall precautions  - Aspiration precuations

## 2024-05-14 NOTE — ED ADULT NURSE REASSESSMENT NOTE - NS ED NURSE REASSESS COMMENT FT1
RN to begin 1 unit [facility] transfusion unit  [id number]. Blood consent in chart. Pt has two patent PIVs. Blood verified with MARILYN Fatima and RN Mohsen. RN explained to pt risks and benefits of blood transfusion. Pt verbalized understanding. RN educated pt on possible signs and symptoms of transfusion reaction.

## 2024-05-14 NOTE — H&P ADULT - PROBLEM SELECTOR PLAN 8
F: None  E: Replete as needed  N: DASH Diet  DVT Proph: 40 Lovenox  GI Proph: None  Code Status:   Dispo: [facility] F: None  E: Replete as needed  N: DASH Diet  DVT Proph:   GI Proph: None  Code Status:   Dispo: [facility] F: None  E: Replete as needed  N: DASH Diet  DVT Proph: Hold for now  GI Proph: PPI BID  Code Status: Full Code  Dispo: [facility]

## 2024-05-14 NOTE — H&P ADULT - NSHPPHYSICALEXAM_GEN_ALL_CORE
.  VITAL SIGNS:  T(C): 36.7 (05-14-24 @ 20:47), Max: 36.8 (05-14-24 @ 16:35)  T(F): 98 (05-14-24 @ 20:47), Max: 98.3 (05-14-24 @ 16:35)  HR: 99 (05-14-24 @ 20:47) (94 - 109)  BP: 137/80 (05-14-24 @ 20:47) (137/80 - 170/92)  BP(mean): --  RR: 16 (05-14-24 @ 20:47) (16 - 18)  SpO2: 100% (05-14-24 @ 20:47) (85% - 100%)  Wt(kg): --    PHYSICAL EXAM:    Constitutional: WDWN resting comfortably in bed; NAD  Head: NC/AT  Eyes: PERRL, EOMI, anicteric sclera  ENT: no nasal discharge; uvula midline, no oropharyngeal erythema or exudates; MMM  Neck: supple; no JVD or thyromegaly  Respiratory: CTA B/L; no W/R/R, no retractions  Cardiac: +S1/S2; RRR; no M/R/G; PMI non-displaced  Gastrointestinal: soft, NT/ND; no rebound or guarding; +BSx4  Genitourinary: normal external genitalia  Back: spine midline, no bony tenderness or step-offs; no CVAT B/L  Extremities: WWP, no clubbing or cyanosis; no peripheral edema  Musculoskeletal: NROM x4; no joint swelling, tenderness or erythema  Vascular: 2+ radial, femoral, DP/PT pulses B/L  Dermatologic: skin warm, dry and intact; no rashes, wounds, or scars  Lymphatic: no submandibular or cervical LAD  Neurologic: AAOx3; CNII-XII grossly intact; no focal deficits  Psychiatric: affect and characteristics of appearance, verbalizations, behaviors are appropriate .  VITAL SIGNS:  T(C): 36.7 (05-14-24 @ 20:47), Max: 36.8 (05-14-24 @ 16:35)  T(F): 98 (05-14-24 @ 20:47), Max: 98.3 (05-14-24 @ 16:35)  HR: 99 (05-14-24 @ 20:47) (94 - 109)  BP: 137/80 (05-14-24 @ 20:47) (137/80 - 170/92)  BP(mean): --  RR: 16 (05-14-24 @ 20:47) (16 - 18)  SpO2: 100% (05-14-24 @ 20:47) (85% - 100%)  Wt(kg): --    PHYSICAL EXAM:    Constitutional: Resting in bed, disheveled   Neck: (+) JVD   Respiratory: Mild crackles appreciated at RLL  Cardiac: +S1/S2; RRR; no M/R/G; PMI non-displaced  Gastrointestinal: soft, NT/ND; no rebound or guarding; +BSx4  Extremities: 2/3 + pitting edema at lower extremities b/l  Vascular: 2+ radial, PT pulses B/L  Dermatologic: skin warm, dry and intact  Neurologic: AAOx3

## 2024-05-14 NOTE — PATIENT PROFILE ADULT - FALL HARM RISK - HARM RISK INTERVENTIONS

## 2024-05-14 NOTE — H&P ADULT - PROBLEM SELECTOR PLAN 4
Patient with hypomagnemia to 1.5 on admission.     Plan:   - Appropriate magnesium repletion Patient with hypomagnesia to 1.5 on admission.     Plan:   - Appropriate magnesium repletion

## 2024-05-15 ENCOUNTER — RESULT REVIEW (OUTPATIENT)
Age: 59
End: 2024-05-15

## 2024-05-15 LAB
ADD ON TEST-SPECIMEN IN LAB: SIGNIFICANT CHANGE UP
ADD ON TEST-SPECIMEN IN LAB: SIGNIFICANT CHANGE UP
ALBUMIN SERPL ELPH-MCNC: 3.2 G/DL — LOW (ref 3.3–5)
ALP SERPL-CCNC: 107 U/L — SIGNIFICANT CHANGE UP (ref 40–120)
ALT FLD-CCNC: 28 U/L — SIGNIFICANT CHANGE UP (ref 10–45)
AMPHET UR-MCNC: NEGATIVE — SIGNIFICANT CHANGE UP
ANION GAP SERPL CALC-SCNC: 13 MMOL/L — SIGNIFICANT CHANGE UP (ref 5–17)
ANION GAP SERPL CALC-SCNC: 17 MMOL/L — SIGNIFICANT CHANGE UP (ref 5–17)
ANISOCYTOSIS BLD QL: SIGNIFICANT CHANGE UP
APPEARANCE UR: CLEAR — SIGNIFICANT CHANGE UP
AST SERPL-CCNC: 46 U/L — HIGH (ref 10–40)
BARBITURATES UR SCN-MCNC: NEGATIVE — SIGNIFICANT CHANGE UP
BASOPHILS # BLD AUTO: 0.07 K/UL — SIGNIFICANT CHANGE UP (ref 0–0.2)
BASOPHILS NFR BLD AUTO: 0.8 % — SIGNIFICANT CHANGE UP (ref 0–2)
BENZODIAZ UR-MCNC: NEGATIVE — SIGNIFICANT CHANGE UP
BILIRUB SERPL-MCNC: 0.4 MG/DL — SIGNIFICANT CHANGE UP (ref 0.2–1.2)
BILIRUB UR-MCNC: NEGATIVE — SIGNIFICANT CHANGE UP
BUN SERPL-MCNC: 3 MG/DL — LOW (ref 7–23)
BUN SERPL-MCNC: 3 MG/DL — LOW (ref 7–23)
CALCIUM SERPL-MCNC: 8.7 MG/DL — SIGNIFICANT CHANGE UP (ref 8.4–10.5)
CALCIUM SERPL-MCNC: 9 MG/DL — SIGNIFICANT CHANGE UP (ref 8.4–10.5)
CHLORIDE SERPL-SCNC: 97 MMOL/L — SIGNIFICANT CHANGE UP (ref 96–108)
CHLORIDE SERPL-SCNC: 98 MMOL/L — SIGNIFICANT CHANGE UP (ref 96–108)
CO2 SERPL-SCNC: 25 MMOL/L — SIGNIFICANT CHANGE UP (ref 22–31)
CO2 SERPL-SCNC: 28 MMOL/L — SIGNIFICANT CHANGE UP (ref 22–31)
COCAINE METAB.OTHER UR-MCNC: NEGATIVE — SIGNIFICANT CHANGE UP
COLOR SPEC: YELLOW — SIGNIFICANT CHANGE UP
CREAT SERPL-MCNC: 0.34 MG/DL — LOW (ref 0.5–1.3)
CREAT SERPL-MCNC: 0.52 MG/DL — SIGNIFICANT CHANGE UP (ref 0.5–1.3)
DIFF PNL FLD: NEGATIVE — SIGNIFICANT CHANGE UP
EGFR: 108 ML/MIN/1.73M2 — SIGNIFICANT CHANGE UP
EGFR: 119 ML/MIN/1.73M2 — SIGNIFICANT CHANGE UP
EOSINOPHIL # BLD AUTO: 0.01 K/UL — SIGNIFICANT CHANGE UP (ref 0–0.5)
EOSINOPHIL NFR BLD AUTO: 0.1 % — SIGNIFICANT CHANGE UP (ref 0–6)
FERRITIN SERPL-MCNC: 40 NG/ML — SIGNIFICANT CHANGE UP (ref 13–330)
FOLATE SERPL-MCNC: >20 NG/ML — SIGNIFICANT CHANGE UP
GLUCOSE SERPL-MCNC: 106 MG/DL — HIGH (ref 70–99)
GLUCOSE SERPL-MCNC: 213 MG/DL — HIGH (ref 70–99)
GLUCOSE UR QL: NEGATIVE MG/DL — SIGNIFICANT CHANGE UP
HAPTOGLOB SERPL-MCNC: 282 MG/DL — HIGH (ref 34–200)
HCT VFR BLD CALC: 28.5 % — LOW (ref 34.5–45)
HGB BLD-MCNC: 8.8 G/DL — LOW (ref 11.5–15.5)
HYPOCHROMIA BLD QL: SLIGHT — SIGNIFICANT CHANGE UP
IMM GRANULOCYTES NFR BLD AUTO: 0.3 % — SIGNIFICANT CHANGE UP (ref 0–0.9)
IRON SATN MFR SERPL: 195 UG/DL — HIGH (ref 30–160)
IRON SATN MFR SERPL: 49 % — SIGNIFICANT CHANGE UP (ref 14–50)
KETONES UR-MCNC: NEGATIVE MG/DL — SIGNIFICANT CHANGE UP
LDH SERPL L TO P-CCNC: 415 U/L — HIGH (ref 50–242)
LEUKOCYTE ESTERASE UR-ACNC: NEGATIVE — SIGNIFICANT CHANGE UP
LYMPHOCYTES # BLD AUTO: 1.09 K/UL — SIGNIFICANT CHANGE UP (ref 1–3.3)
LYMPHOCYTES # BLD AUTO: 12.6 % — LOW (ref 13–44)
MACROCYTES BLD QL: SLIGHT — SIGNIFICANT CHANGE UP
MAGNESIUM SERPL-MCNC: 1.7 MG/DL — SIGNIFICANT CHANGE UP (ref 1.6–2.6)
MAGNESIUM SERPL-MCNC: 2.3 MG/DL — SIGNIFICANT CHANGE UP (ref 1.6–2.6)
MANUAL SMEAR VERIFICATION: SIGNIFICANT CHANGE UP
MCHC RBC-ENTMCNC: 30.8 PG — SIGNIFICANT CHANGE UP (ref 27–34)
MCHC RBC-ENTMCNC: 30.9 GM/DL — LOW (ref 32–36)
MCV RBC AUTO: 99.7 FL — SIGNIFICANT CHANGE UP (ref 80–100)
METHADONE UR-MCNC: NEGATIVE — SIGNIFICANT CHANGE UP
MICROCYTES BLD QL: SLIGHT — SIGNIFICANT CHANGE UP
MONOCYTES # BLD AUTO: 1.2 K/UL — HIGH (ref 0–0.9)
MONOCYTES NFR BLD AUTO: 13.9 % — SIGNIFICANT CHANGE UP (ref 2–14)
NEUTROPHILS # BLD AUTO: 6.23 K/UL — SIGNIFICANT CHANGE UP (ref 1.8–7.4)
NEUTROPHILS NFR BLD AUTO: 72.3 % — SIGNIFICANT CHANGE UP (ref 43–77)
NITRITE UR-MCNC: NEGATIVE — SIGNIFICANT CHANGE UP
NRBC # BLD: 0 /100 WBCS — SIGNIFICANT CHANGE UP (ref 0–0)
OPIATES UR-MCNC: NEGATIVE — SIGNIFICANT CHANGE UP
OVALOCYTES BLD QL SMEAR: SLIGHT — SIGNIFICANT CHANGE UP
PCP SPEC-MCNC: SIGNIFICANT CHANGE UP
PCP UR-MCNC: NEGATIVE — SIGNIFICANT CHANGE UP
PH UR: 7 — SIGNIFICANT CHANGE UP (ref 5–8)
PHOSPHATE SERPL-MCNC: 3.2 MG/DL — SIGNIFICANT CHANGE UP (ref 2.5–4.5)
PLAT MORPH BLD: NORMAL — SIGNIFICANT CHANGE UP
PLATELET # BLD AUTO: 419 K/UL — HIGH (ref 150–400)
POIKILOCYTOSIS BLD QL AUTO: SLIGHT — SIGNIFICANT CHANGE UP
POLYCHROMASIA BLD QL SMEAR: SLIGHT — SIGNIFICANT CHANGE UP
POTASSIUM SERPL-MCNC: 2.8 MMOL/L — CRITICAL LOW (ref 3.5–5.3)
POTASSIUM SERPL-MCNC: 3.2 MMOL/L — LOW (ref 3.5–5.3)
POTASSIUM SERPL-SCNC: 2.8 MMOL/L — CRITICAL LOW (ref 3.5–5.3)
POTASSIUM SERPL-SCNC: 3.2 MMOL/L — LOW (ref 3.5–5.3)
PROT SERPL-MCNC: 6 G/DL — SIGNIFICANT CHANGE UP (ref 6–8.3)
PROT UR-MCNC: NEGATIVE MG/DL — SIGNIFICANT CHANGE UP
RBC # BLD: 2.86 M/UL — LOW (ref 3.8–5.2)
RBC # FLD: 20.9 % — HIGH (ref 10.3–14.5)
RBC BLD AUTO: ABNORMAL
RETICS #: 42.2 K/UL — SIGNIFICANT CHANGE UP (ref 25–125)
RETICS/RBC NFR: 1.5 % — SIGNIFICANT CHANGE UP (ref 0.5–2.5)
SODIUM SERPL-SCNC: 139 MMOL/L — SIGNIFICANT CHANGE UP (ref 135–145)
SODIUM SERPL-SCNC: 139 MMOL/L — SIGNIFICANT CHANGE UP (ref 135–145)
SP GR SPEC: 1.01 — SIGNIFICANT CHANGE UP (ref 1–1.03)
THC UR QL: NEGATIVE — SIGNIFICANT CHANGE UP
TIBC SERPL-MCNC: 399 UG/DL — SIGNIFICANT CHANGE UP (ref 220–430)
UIBC SERPL-MCNC: 204 UG/DL — SIGNIFICANT CHANGE UP (ref 110–370)
UROBILINOGEN FLD QL: 0.2 MG/DL — SIGNIFICANT CHANGE UP (ref 0.2–1)
VIT B12 SERPL-MCNC: 289 PG/ML — SIGNIFICANT CHANGE UP (ref 232–1245)
WBC # BLD: 8.63 K/UL — SIGNIFICANT CHANGE UP (ref 3.8–10.5)
WBC # FLD AUTO: 8.63 K/UL — SIGNIFICANT CHANGE UP (ref 3.8–10.5)

## 2024-05-15 PROCEDURE — 93306 TTE W/DOPPLER COMPLETE: CPT | Mod: 26

## 2024-05-15 PROCEDURE — 99233 SBSQ HOSP IP/OBS HIGH 50: CPT | Mod: GC

## 2024-05-15 RX ORDER — PANTOPRAZOLE SODIUM 20 MG/1
40 TABLET, DELAYED RELEASE ORAL
Refills: 0 | Status: DISCONTINUED | OUTPATIENT
Start: 2024-05-16 | End: 2024-05-16

## 2024-05-15 RX ORDER — THIAMINE MONONITRATE (VIT B1) 100 MG
100 TABLET ORAL DAILY
Refills: 0 | Status: DISCONTINUED | OUTPATIENT
Start: 2024-05-15 | End: 2024-05-16

## 2024-05-15 RX ORDER — MAGNESIUM SULFATE 500 MG/ML
2 VIAL (ML) INJECTION ONCE
Refills: 0 | Status: COMPLETED | OUTPATIENT
Start: 2024-05-15 | End: 2024-05-15

## 2024-05-15 RX ORDER — PANTOPRAZOLE SODIUM 20 MG/1
40 TABLET, DELAYED RELEASE ORAL EVERY 12 HOURS
Refills: 0 | Status: DISCONTINUED | OUTPATIENT
Start: 2024-05-15 | End: 2024-05-15

## 2024-05-15 RX ORDER — LEVETIRACETAM 250 MG/1
1 TABLET, FILM COATED ORAL
Refills: 0 | DISCHARGE

## 2024-05-15 RX ORDER — LEVETIRACETAM 250 MG/1
250 TABLET, FILM COATED ORAL EVERY 24 HOURS
Refills: 0 | Status: DISCONTINUED | OUTPATIENT
Start: 2024-05-15 | End: 2024-05-16

## 2024-05-15 RX ORDER — FOLIC ACID 0.8 MG
1 TABLET ORAL DAILY
Refills: 0 | Status: DISCONTINUED | OUTPATIENT
Start: 2024-05-15 | End: 2024-05-16

## 2024-05-15 RX ORDER — ACETAMINOPHEN 500 MG
650 TABLET ORAL EVERY 6 HOURS
Refills: 0 | Status: DISCONTINUED | OUTPATIENT
Start: 2024-05-15 | End: 2024-05-16

## 2024-05-15 RX ORDER — POTASSIUM CHLORIDE 20 MEQ
10 PACKET (EA) ORAL
Refills: 0 | Status: DISCONTINUED | OUTPATIENT
Start: 2024-05-15 | End: 2024-05-15

## 2024-05-15 RX ORDER — POTASSIUM CHLORIDE 20 MEQ
40 PACKET (EA) ORAL ONCE
Refills: 0 | Status: COMPLETED | OUTPATIENT
Start: 2024-05-15 | End: 2024-05-15

## 2024-05-15 RX ORDER — LEVETIRACETAM 250 MG/1
500 TABLET, FILM COATED ORAL EVERY 24 HOURS
Refills: 0 | Status: DISCONTINUED | OUTPATIENT
Start: 2024-05-15 | End: 2024-05-16

## 2024-05-15 RX ORDER — POTASSIUM CHLORIDE 20 MEQ
40 PACKET (EA) ORAL EVERY 4 HOURS
Refills: 0 | Status: COMPLETED | OUTPATIENT
Start: 2024-05-15 | End: 2024-05-15

## 2024-05-15 RX ADMIN — Medication 1 MILLIGRAM(S): at 12:34

## 2024-05-15 RX ADMIN — Medication 25 GRAM(S): at 01:19

## 2024-05-15 RX ADMIN — Medication 40 MILLIGRAM(S): at 00:59

## 2024-05-15 RX ADMIN — Medication 40 MILLIEQUIVALENT(S): at 17:33

## 2024-05-15 RX ADMIN — Medication 100 MILLIGRAM(S): at 12:34

## 2024-05-15 RX ADMIN — Medication 650 MILLIGRAM(S): at 23:46

## 2024-05-15 RX ADMIN — Medication 40 MILLIEQUIVALENT(S): at 10:05

## 2024-05-15 RX ADMIN — LEVETIRACETAM 500 MILLIGRAM(S): 250 TABLET, FILM COATED ORAL at 22:46

## 2024-05-15 RX ADMIN — Medication 1 TABLET(S): at 12:34

## 2024-05-15 RX ADMIN — Medication 650 MILLIGRAM(S): at 03:29

## 2024-05-15 RX ADMIN — Medication 40 MILLIEQUIVALENT(S): at 15:09

## 2024-05-15 RX ADMIN — Medication 25 GRAM(S): at 11:27

## 2024-05-15 RX ADMIN — PANTOPRAZOLE SODIUM 40 MILLIGRAM(S): 20 TABLET, DELAYED RELEASE ORAL at 06:20

## 2024-05-15 RX ADMIN — Medication 650 MILLIGRAM(S): at 22:46

## 2024-05-15 RX ADMIN — Medication 650 MILLIGRAM(S): at 04:29

## 2024-05-15 RX ADMIN — LEVETIRACETAM 250 MILLIGRAM(S): 250 TABLET, FILM COATED ORAL at 10:06

## 2024-05-15 NOTE — PROGRESS NOTE ADULT - PROBLEM SELECTOR PLAN 5
Patient with history of seizures, poor compliance with outpatient medications. Has had seizures in past secondarily to alcohol withdrawal vs poor compliance with medications. Last time she visited neurologist ws in 2022. Prescribed Keppra 500 mg BID.    Plan:   - C/w keppra 250 mg AM and 500 mg PM per recent Neurology recs on HIE  - Seizure precautions  - Fall precautions  - Aspiration precuations

## 2024-05-15 NOTE — PROGRESS NOTE ADULT - SUBJECTIVE AND OBJECTIVE BOX
******INCOMPLETE******    OVERNIGHT EVENTS/INTERVAL HPI:    SUBJECTIVE:     OBJECTIVE:  Vital Signs Last 24 Hrs  T(C): 37.6 (15 May 2024 05:45), Max: 37.6 (15 May 2024 05:45)  T(F): 99.7 (15 May 2024 05:45), Max: 99.7 (15 May 2024 05:45)  HR: 86 (15 May 2024 05:45) (86 - 109)  BP: 147/77 (15 May 2024 05:45) (137/80 - 170/92)  BP(mean): 104 (15 May 2024 00:58) (104 - 119)  RR: 19 (15 May 2024 05:45) (16 - 19)  SpO2: 97% (15 May 2024 05:45) (85% - 100%)    Parameters below as of 15 May 2024 00:58  Patient On (Oxygen Delivery Method): nasal cannula  O2 Flow (L/min): 4    I&O's Detail    14 May 2024 07:01  -  15 May 2024 07:00  --------------------------------------------------------  IN:    IV PiggyBack: 50 mL    PRBCs (Packed Red Blood Cells): 300 mL  Total IN: 350 mL    OUT:    Voided (mL): 1850 mL  Total OUT: 1850 mL    Total NET: -1500 mL    Physical Exam:      Medications:  MEDICATIONS  (STANDING):  folic acid 1 milliGRAM(s) Oral daily  levETIRAcetam 500 milliGRAM(s) Oral every 24 hours  levETIRAcetam 250 milliGRAM(s) Oral every 24 hours  multivitamin 1 Tablet(s) Oral daily  pantoprazole  Injectable 40 milliGRAM(s) IV Push every 12 hours  thiamine 100 milliGRAM(s) Oral daily    MEDICATIONS  (PRN):  acetaminophen     Tablet .. 650 milliGRAM(s) Oral every 6 hours PRN Mild Pain (1 - 3)  LORazepam   Injectable 2 milliGRAM(s) IV Push every 6 hours PRN CIWA-Ar score 8 or greater      Labs:                    ******INCOMPLETE******    OVERNIGHT EVENTS/INTERVAL HPI: Hb responded to 8.8 from 6.8 after 1U prbc    SUBJECTIVE: patient seen and examined at bedside. Patient c/o LE edema and pain, reported history of intermittent crampy epigastric pain that worsens after meals. She reports poor dietary intake. Reports diarrhea, denies N/V, hematochezia, hematemesis. Remaining ROS negative.     OBJECTIVE:  Vital Signs Last 24 Hrs  T(C): 37.6 (15 May 2024 05:45), Max: 37.6 (15 May 2024 05:45)  T(F): 99.7 (15 May 2024 05:45), Max: 99.7 (15 May 2024 05:45)  HR: 86 (15 May 2024 05:45) (86 - 109)  BP: 147/77 (15 May 2024 05:45) (137/80 - 170/92)  BP(mean): 104 (15 May 2024 00:58) (104 - 119)  RR: 19 (15 May 2024 05:45) (16 - 19)  SpO2: 97% (15 May 2024 05:45) (85% - 100%)    Parameters below as of 15 May 2024 00:58  Patient On (Oxygen Delivery Method): nasal cannula  O2 Flow (L/min): 4    I&O's Detail    14 May 2024 07:01  -  15 May 2024 07:00  --------------------------------------------------------  IN:    IV PiggyBack: 50 mL    PRBCs (Packed Red Blood Cells): 300 mL  Total IN: 350 mL    OUT:    Voided (mL): 1850 mL  Total OUT: 1850 mL    Total NET: -1500 mL    Physical Exam:  Internal Medicine Progress Note  Gracy Loyola, PGY-1    ******INCOMPLETE******    OVERNIGHT EVENTS/INTERVAL HPI:    OBJECTIVE:  Vital Signs Last 24 Hrs  T(C): 37.6 (15 May 2024 05:45), Max: 37.6 (15 May 2024 05:45)  T(F): 99.7 (15 May 2024 05:45), Max: 99.7 (15 May 2024 05:45)  HR: 86 (15 May 2024 05:45) (86 - 109)  BP: 147/77 (15 May 2024 05:45) (137/80 - 170/92)  BP(mean): 104 (15 May 2024 00:58) (104 - 119)  RR: 17 (15 May 2024 11:22) (16 - 19)  SpO2: 95% (15 May 2024 11:22) (85% - 100%)    Parameters below as of 15 May 2024 11:22  Patient On (Oxygen Delivery Method): room air      I&O's Detail    14 May 2024 07:01  -  15 May 2024 07:00  --------------------------------------------------------  IN:    IV PiggyBack: 50 mL    PRBCs (Packed Red Blood Cells): 300 mL  Total IN: 350 mL    OUT:    Voided (mL): 2325 mL  Total OUT: 2325 mL    Total NET: -1975 mL        Physical Exam:  GENERAL: Awake, alert and interactive, no acute distress, appears comfortable  NEURO: A&Ox4, no focal deficits, 5/5 strength in all ext, reflexes 2+ throughout, CN 2-12 intact  HEENT: Normocephalic, atraumatic, no conjunctivitis or scleral icterus, oral mucosa moist, no oral lesions noted  NECK: Supple, no LAD, no JVD, thyroid not palpable  CARDIAC: Regular rate and rhythm, +S1/S2, no murmurs/rubs/gallops  PULM: Breathing comfortably on RA, clear to auscultation bilaterally, no wheezes/rales/rhonchi  ABDOMEN: Soft, nontender, nondistended, +bs, no hepatosplenomegaly, no rebound tenderness or fluid wave, no CVA tenderness  : Deferred  MSK: Range of motion grossly intact, no back tenderness  SKIN: Warm and dry, no rashes, lesions  VASC: Cap refil < 2 sec, 2+ peripheral pulses, no edema, no LE tenderness  Psych: Appropriate affect    Medications:  MEDICATIONS  (STANDING):  folic acid 1 milliGRAM(s) Oral daily  levETIRAcetam 500 milliGRAM(s) Oral every 24 hours  levETIRAcetam 250 milliGRAM(s) Oral every 24 hours  multivitamin 1 Tablet(s) Oral daily  potassium chloride    Tablet ER 40 milliEquivalent(s) Oral every 4 hours  thiamine 100 milliGRAM(s) Oral daily    MEDICATIONS  (PRN):  acetaminophen     Tablet .. 650 milliGRAM(s) Oral every 6 hours PRN Mild Pain (1 - 3)  LORazepam   Injectable 2 milliGRAM(s) IV Push every 6 hours PRN CIWA-Ar score 8 or greater      Labs:                        8.8    8.63  )-----------( 419      ( 15 May 2024 05:30 )             28.5     05-15    139  |  97  |  3<L>  ----------------------------<  106<H>  2.8<LL>   |  25  |  0.34<L>    Ca    8.7      15 May 2024 05:30  Phos  3.2     05-15  Mg     1.7     05-15    TPro  6.0  /  Alb  3.2<L>  /  TBili  0.4  /  DBili  x   /  AST  46<H>  /  ALT  28  /  AlkPhos  107  05-15        Urinalysis Basic - ( 15 May 2024 05:30 )    Color: x / Appearance: x / SG: x / pH: x  Gluc: 106 mg/dL / Ketone: x  / Bili: x / Urobili: x   Blood: x / Protein: x / Nitrite: x   Leuk Esterase: x / RBC: x / WBC x   Sq Epi: x / Non Sq Epi: x / Bacteria: x          Radiology: Reviewed      Medications:  MEDICATIONS  (STANDING):  folic acid 1 milliGRAM(s) Oral daily  levETIRAcetam 500 milliGRAM(s) Oral every 24 hours  levETIRAcetam 250 milliGRAM(s) Oral every 24 hours  multivitamin 1 Tablet(s) Oral daily  pantoprazole  Injectable 40 milliGRAM(s) IV Push every 12 hours  thiamine 100 milliGRAM(s) Oral daily    MEDICATIONS  (PRN):  acetaminophen     Tablet .. 650 milliGRAM(s) Oral every 6 hours PRN Mild Pain (1 - 3)  LORazepam   Injectable 2 milliGRAM(s) IV Push every 6 hours PRN CIWA-Ar score 8 or greater      Labs:                    ******INCOMPLETE******    OVERNIGHT EVENTS/INTERVAL HPI: Hb responded to 8.8 from 6.8 after 1U prbc    SUBJECTIVE: patient seen and examined at bedside. Patient c/o LE edema and pain, reported history of intermittent crampy epigastric pain that worsens after meals. She reports poor dietary intake. Reports diarrhea, denies N/V, hematochezia, hematemesis. Remaining ROS negative.     OBJECTIVE:  Vital Signs Last 24 Hrs  T(C): 37.6 (15 May 2024 05:45), Max: 37.6 (15 May 2024 05:45)  T(F): 99.7 (15 May 2024 05:45), Max: 99.7 (15 May 2024 05:45)  HR: 86 (15 May 2024 05:45) (86 - 109)  BP: 147/77 (15 May 2024 05:45) (137/80 - 170/92)  BP(mean): 104 (15 May 2024 00:58) (104 - 119)  RR: 19 (15 May 2024 05:45) (16 - 19)  SpO2: 97% (15 May 2024 05:45) (85% - 100%)    Parameters below as of 15 May 2024 00:58  Patient On (Oxygen Delivery Method): nasal cannula  O2 Flow (L/min): 4    I&O's Detail    14 May 2024 07:01  -  15 May 2024 07:00  --------------------------------------------------------  IN:    IV PiggyBack: 50 mL    PRBCs (Packed Red Blood Cells): 300 mL  Total IN: 350 mL    OUT:    Voided (mL): 1850 mL  Total OUT: 1850 mL    Total NET: -1500 mL    Physical Exam:  GENERAL: NAD, appears comfortable, awake, alert and interactive  NEURO: A&Ox3, no focal deficits, reflexes 2+ throughout, CN 2-12 intact  HEENT: Normocephalic, atraumatic, no conjunctivitis or scleral icterus, oral mucosa moist, no oral lesions noted  NECK: positive JVD, no LAD  CARDIAC: Regular rate and rhythm, +S1/S2, no murmurs/rubs/gallops  PULM: Breathing on 4L NC, mild crackles RLL  ABDOMEN: Soft, nontender, nondistended, +bs, no hepatosplenomegaly, no rebound tenderness or fluid wave, no CVA tenderness  : Deferred  MSK: Range of motion grossly intact, no back tenderness  SKIN: Warm and dry, no rashes, lesions  VASC: Cap refil < 2 sec, 2+ peripheral pulses, 2/3+ LE edema b/l, LE tenderness on palpation  Psych: Appropriate affect    Medications:  MEDICATIONS  (STANDING):  folic acid 1 milliGRAM(s) Oral daily  levETIRAcetam 500 milliGRAM(s) Oral every 24 hours  levETIRAcetam 250 milliGRAM(s) Oral every 24 hours  multivitamin 1 Tablet(s) Oral daily  potassium chloride    Tablet ER 40 milliEquivalent(s) Oral every 4 hours  thiamine 100 milliGRAM(s) Oral daily    MEDICATIONS  (PRN):  acetaminophen     Tablet .. 650 milliGRAM(s) Oral every 6 hours PRN Mild Pain (1 - 3)  LORazepam   Injectable 2 milliGRAM(s) IV Push every 6 hours PRN CIWA-Ar score 8 or greater      Labs:                        8.8    8.63  )-----------( 419      ( 15 May 2024 05:30 )             28.5     05-15    139  |  97  |  3<L>  ----------------------------<  106<H>  2.8<LL>   |  25  |  0.34<L>    Ca    8.7      15 May 2024 05:30  Phos  3.2     05-15  Mg     1.7     05-15    TPro  6.0  /  Alb  3.2<L>  /  TBili  0.4  /  DBili  x   /  AST  46<H>  /  ALT  28  /  AlkPhos  107  05-15        Urinalysis Basic - ( 15 May 2024 05:30 )    Color: x / Appearance: x / SG: x / pH: x  Gluc: 106 mg/dL / Ketone: x  / Bili: x / Urobili: x   Blood: x / Protein: x / Nitrite: x   Leuk Esterase: x / RBC: x / WBC x   Sq Epi: x / Non Sq Epi: x / Bacteria: x          Radiology: Reviewed       OVERNIGHT EVENTS/INTERVAL HPI: Hb responded to 8.8 from 6.8 after 1U prbc    SUBJECTIVE: patient seen and examined at bedside. Patient c/o LE edema and pain, reported history of intermittent crampy epigastric pain that worsens after meals. She reports poor dietary intake. Reports diarrhea, denies N/V, hematochezia, hematemesis. Remaining ROS negative.     OBJECTIVE:  Vital Signs Last 24 Hrs  T(C): 37.6 (15 May 2024 05:45), Max: 37.6 (15 May 2024 05:45)  T(F): 99.7 (15 May 2024 05:45), Max: 99.7 (15 May 2024 05:45)  HR: 86 (15 May 2024 05:45) (86 - 109)  BP: 147/77 (15 May 2024 05:45) (137/80 - 170/92)  BP(mean): 104 (15 May 2024 00:58) (104 - 119)  RR: 19 (15 May 2024 05:45) (16 - 19)  SpO2: 97% (15 May 2024 05:45) (85% - 100%)    Parameters below as of 15 May 2024 00:58  Patient On (Oxygen Delivery Method): nasal cannula  O2 Flow (L/min): 4    I&O's Detail    14 May 2024 07:01  -  15 May 2024 07:00  --------------------------------------------------------  IN:    IV PiggyBack: 50 mL    PRBCs (Packed Red Blood Cells): 300 mL  Total IN: 350 mL    OUT:    Voided (mL): 1850 mL  Total OUT: 1850 mL    Total NET: -1500 mL    Physical Exam:  GENERAL: NAD, appears comfortable, awake, alert and interactive  NEURO: A&Ox3, no focal deficits, reflexes 2+ throughout, CN 2-12 intact  HEENT: Normocephalic, atraumatic, no conjunctivitis or scleral icterus, oral mucosa moist, no oral lesions noted  NECK: positive JVD, no LAD  CARDIAC: Regular rate and rhythm, +S1/S2, no murmurs/rubs/gallops  PULM: Breathing on 4L NC, mild crackles RLL  ABDOMEN: Soft, nontender, nondistended, +bs, no hepatosplenomegaly, no rebound tenderness or fluid wave, no CVA tenderness  : Deferred  MSK: Range of motion grossly intact, no back tenderness  SKIN: Warm and dry, no rashes, lesions  VASC: Cap refil < 2 sec, 2+ peripheral pulses, 2/3+ LE edema b/l, LE tenderness on palpation  Psych: Appropriate affect    Medications:  MEDICATIONS  (STANDING):  folic acid 1 milliGRAM(s) Oral daily  levETIRAcetam 500 milliGRAM(s) Oral every 24 hours  levETIRAcetam 250 milliGRAM(s) Oral every 24 hours  multivitamin 1 Tablet(s) Oral daily  potassium chloride    Tablet ER 40 milliEquivalent(s) Oral every 4 hours  thiamine 100 milliGRAM(s) Oral daily    MEDICATIONS  (PRN):  acetaminophen     Tablet .. 650 milliGRAM(s) Oral every 6 hours PRN Mild Pain (1 - 3)  LORazepam   Injectable 2 milliGRAM(s) IV Push every 6 hours PRN CIWA-Ar score 8 or greater      Labs:                        8.8    8.63  )-----------( 419      ( 15 May 2024 05:30 )             28.5     05-15    139  |  97  |  3<L>  ----------------------------<  106<H>  2.8<LL>   |  25  |  0.34<L>    Ca    8.7      15 May 2024 05:30  Phos  3.2     05-15  Mg     1.7     05-15    TPro  6.0  /  Alb  3.2<L>  /  TBili  0.4  /  DBili  x   /  AST  46<H>  /  ALT  28  /  AlkPhos  107  05-15        Urinalysis Basic - ( 15 May 2024 05:30 )    Color: x / Appearance: x / SG: x / pH: x  Gluc: 106 mg/dL / Ketone: x  / Bili: x / Urobili: x   Blood: x / Protein: x / Nitrite: x   Leuk Esterase: x / RBC: x / WBC x   Sq Epi: x / Non Sq Epi: x / Bacteria: x          Radiology: Reviewed

## 2024-05-15 NOTE — PROGRESS NOTE ADULT - PROBLEM SELECTOR PLAN 1
Patient requiring 2L NC while breathing at 89% on RA. Chest xray evidencing increased vascular congestion and edema comparably to most recent chest xray. BNP elevated to 933. LE edema apparent b/l. Echo obtained at last visit evidencing EF of 63%.      Plan:   - Lasix 40 mg IV x1  - F/u repeat echo given worsening symptoms   - C/w supplemental oxygen as needed, wean as tolerated  - Strict urinary output Patient requiring 4L NC while breathing at 89% on RA. Chest xray evidencing increased vascular congestion and edema comparably to most recent chest xray. BNP elevated to 933. LE edema apparent b/l. Echo obtained at last visit evidencing EF of 63%.      Plan:   - hold off Lasix 40 mg IV x1  - pain management:   - repeat echo no remarkable findings, PASP 38mmg Hg. f/u cardiology outpatient  - wean supplemental oxygen as tolerated  - Strict urinary output

## 2024-05-15 NOTE — PROGRESS NOTE ADULT - PROBLEM SELECTOR PLAN 2
Worsening lower extremity per patient. Was in ED on April 30th for same complaints but apparently worsening now. Concern for HF given b/l nature of edema, congestion in cxr and elevated BNP vs lymphedema vs venous stasis vs b/l DVT's.     Plan:   - 40 mg IV lasix  - F/u echo   - Hold off on LE doppler given normalcy two weeks ago Worsening lower extremity per patient. Was in ED on April 30th for same complaints but apparently worsening now. low concern for HF given TTE largely wnl. Could be likely due to lymphedema vs venous stasis    Plan:   - echo: no remarkable changes from last echo, except as specified above  - Hold off on LE doppler given normalcy two weeks ago

## 2024-05-15 NOTE — PROGRESS NOTE ADULT - PROBLEM SELECTOR PLAN 7
Unclear if pt is on home meds for HTN as she cannot recall, no antihypertensive meds in surescripts. In 09/2023 pt was discharged on clonidine 0.1mg BID and hydral 25mg QID.    Plan:   - Follow up complete med rec in AM ([facility] [address]) Unclear if pt is on home meds for HTN as she cannot recall, no antihypertensive meds in surescripts. In 09/2023 pt was discharged on clonidine 0.1mg BID and hydral 25mg QID.    Plan:   - Follow up complete med rec

## 2024-05-15 NOTE — PROGRESS NOTE ADULT - PROBLEM SELECTOR PLAN 6
Patient with history of alcohol use. 1-2 drinks per night. Hx of alcohol withdrawal with seizures, admitted to MICU in September 2023. Continues to drink, blood alcohol level 39 on admission.     Plan:  - CIWA protocol with ativan prn for CIWA >8  - Thiamine   - Folic acid  - Multivitamin Patient with history of alcohol use. 1-2 drinks per night. Hx of alcohol withdrawal with seizures, admitted to MICU in September 2023. Continues to drink, blood alcohol level 39 on admission.     Plan:  - CIWA protocol with ativan prn for CIWA >8  - f/u  for education on substance use   - Thiamine   - Folic acid  - Multivitamin

## 2024-05-15 NOTE — PROGRESS NOTE ADULT - PROBLEM SELECTOR PLAN 3
Patient wit hgb of 6.8 on admission with MCV of 107. No active source of bleed. Possibly in the setting of poor oral intake and compliance with vitamin supplementation but hgb downtrended from 11 since March of this year. Hemodynamically stable.   S/p 1U prbc    Plan:   - F/u CBC in AM  - Folic acid supplementation provided  - F/u Iron labs w/ concern for bleed  - PPI IV BID w/ concern over sig drop of Hgb from earlier this year (11.7 in March)  - F/u B12 and Folic Acid   - Active Type and Screen  - Transfuse if hgb <7 Patient wit hgb of 6.8 on admission with MCV of 107, responded to 8.8 after 1U prbc. No active source of bleed. Possibly in the setting of poor oral intake and compliance with vitamin supplementation but hgb downtrended from 11 since March of this year. Hemodynamically stable.     Plan:   - F/u CBC  - Folic acid supplementation provided  - PPI 40 qd BID w/ concern over sig drop of Hgb from earlier this year (11.7 in March)  - F/u B12 and Folic Acid   - Active Type and Screen  - Transfuse if hgb <7

## 2024-05-15 NOTE — PROGRESS NOTE ADULT - ATTENDING COMMENTS
Pt. seen and examined by me earlier today; I have read Dr. Jones's note, I agree w/ her findings and plan of care as documented; Hb improved s/p PRBC transfusion; Pt. denies bleeding sx; ferritin 40, Pt. would benefit from bidirectional scope as outpatient (per Pt., she has never had a screening colonoscopy before); Pt. 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 would benefit from outpatient Vascular f/u; hypokalemia repleted, is likely due to Lasix, will hold further diuresis for now; keep legs elevated; f/u PT c/s Pt. seen and examined by me earlier today; I have read Dr. De Souza's note, I agree w/ her findings and plan of care as documented; Hb improved s/p PRBC transfusion; Pt. denies bleeding sx; ferritin 40, Pt. would benefit from bidirectional scope as outpatient (per Pt., she has never had a screening colonoscopy before); Pt. 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 would benefit from outpatient Vascular f/u; hypokalemia repleted, is likely due to Lasix, will hold further diuresis for now; keep legs elevated; f/u PT c/s

## 2024-05-16 ENCOUNTER — TRANSCRIPTION ENCOUNTER (OUTPATIENT)
Age: 59
End: 2024-05-16

## 2024-05-16 VITALS
DIASTOLIC BLOOD PRESSURE: 90 MMHG | OXYGEN SATURATION: 94 % | SYSTOLIC BLOOD PRESSURE: 132 MMHG | TEMPERATURE: 98 F | HEART RATE: 83 BPM | RESPIRATION RATE: 18 BRPM

## 2024-05-16 DIAGNOSIS — R19.7 DIARRHEA, UNSPECIFIED: ICD-10-CM

## 2024-05-16 LAB
ANION GAP SERPL CALC-SCNC: 13 MMOL/L — SIGNIFICANT CHANGE UP (ref 5–17)
BLD GP AB SCN SERPL QL: NEGATIVE — SIGNIFICANT CHANGE UP
BUN SERPL-MCNC: 4 MG/DL — LOW (ref 7–23)
CALCIUM SERPL-MCNC: 9 MG/DL — SIGNIFICANT CHANGE UP (ref 8.4–10.5)
CHLORIDE SERPL-SCNC: 101 MMOL/L — SIGNIFICANT CHANGE UP (ref 96–108)
CO2 SERPL-SCNC: 24 MMOL/L — SIGNIFICANT CHANGE UP (ref 22–31)
CREAT SERPL-MCNC: 0.4 MG/DL — LOW (ref 0.5–1.3)
EGFR: 115 ML/MIN/1.73M2 — SIGNIFICANT CHANGE UP
GLUCOSE SERPL-MCNC: 144 MG/DL — HIGH (ref 70–99)
HCT VFR BLD CALC: 27 % — LOW (ref 34.5–45)
HGB BLD-MCNC: 8.3 G/DL — LOW (ref 11.5–15.5)
MAGNESIUM SERPL-MCNC: 1.7 MG/DL — SIGNIFICANT CHANGE UP (ref 1.6–2.6)
MCHC RBC-ENTMCNC: 30.5 PG — SIGNIFICANT CHANGE UP (ref 27–34)
MCHC RBC-ENTMCNC: 30.7 GM/DL — LOW (ref 32–36)
MCV RBC AUTO: 99.3 FL — SIGNIFICANT CHANGE UP (ref 80–100)
NRBC # BLD: 0 /100 WBCS — SIGNIFICANT CHANGE UP (ref 0–0)
PHOSPHATE SERPL-MCNC: 3.8 MG/DL — SIGNIFICANT CHANGE UP (ref 2.5–4.5)
PLATELET # BLD AUTO: 326 K/UL — SIGNIFICANT CHANGE UP (ref 150–400)
POTASSIUM SERPL-MCNC: 3.9 MMOL/L — SIGNIFICANT CHANGE UP (ref 3.5–5.3)
POTASSIUM SERPL-SCNC: 3.9 MMOL/L — SIGNIFICANT CHANGE UP (ref 3.5–5.3)
RBC # BLD: 2.72 M/UL — LOW (ref 3.8–5.2)
RBC # FLD: 20.1 % — HIGH (ref 10.3–14.5)
RH IG SCN BLD-IMP: POSITIVE — SIGNIFICANT CHANGE UP
SODIUM SERPL-SCNC: 138 MMOL/L — SIGNIFICANT CHANGE UP (ref 135–145)
WBC # BLD: 7.35 K/UL — SIGNIFICANT CHANGE UP (ref 3.8–10.5)
WBC # FLD AUTO: 7.35 K/UL — SIGNIFICANT CHANGE UP (ref 3.8–10.5)

## 2024-05-16 PROCEDURE — 84100 ASSAY OF PHOSPHORUS: CPT

## 2024-05-16 PROCEDURE — 99285 EMERGENCY DEPT VISIT HI MDM: CPT

## 2024-05-16 PROCEDURE — 82746 ASSAY OF FOLIC ACID SERUM: CPT

## 2024-05-16 PROCEDURE — 86900 BLOOD TYPING SEROLOGIC ABO: CPT

## 2024-05-16 PROCEDURE — 83540 ASSAY OF IRON: CPT

## 2024-05-16 PROCEDURE — 80307 DRUG TEST PRSMV CHEM ANLYZR: CPT

## 2024-05-16 PROCEDURE — 86850 RBC ANTIBODY SCREEN: CPT

## 2024-05-16 PROCEDURE — 80053 COMPREHEN METABOLIC PANEL: CPT

## 2024-05-16 PROCEDURE — 36415 COLL VENOUS BLD VENIPUNCTURE: CPT

## 2024-05-16 PROCEDURE — 83880 ASSAY OF NATRIURETIC PEPTIDE: CPT

## 2024-05-16 PROCEDURE — 83615 LACTATE (LD) (LDH) ENZYME: CPT

## 2024-05-16 PROCEDURE — 83550 IRON BINDING TEST: CPT

## 2024-05-16 PROCEDURE — 80048 BASIC METABOLIC PNL TOTAL CA: CPT

## 2024-05-16 PROCEDURE — 85045 AUTOMATED RETICULOCYTE COUNT: CPT

## 2024-05-16 PROCEDURE — 85025 COMPLETE CBC W/AUTO DIFF WBC: CPT

## 2024-05-16 PROCEDURE — 82607 VITAMIN B-12: CPT

## 2024-05-16 PROCEDURE — 71045 X-RAY EXAM CHEST 1 VIEW: CPT

## 2024-05-16 PROCEDURE — 83735 ASSAY OF MAGNESIUM: CPT

## 2024-05-16 PROCEDURE — 81003 URINALYSIS AUTO W/O SCOPE: CPT

## 2024-05-16 PROCEDURE — 80177 DRUG SCRN QUAN LEVETIRACETAM: CPT

## 2024-05-16 PROCEDURE — 86923 COMPATIBILITY TEST ELECTRIC: CPT

## 2024-05-16 PROCEDURE — P9016: CPT

## 2024-05-16 PROCEDURE — 86901 BLOOD TYPING SEROLOGIC RH(D): CPT

## 2024-05-16 PROCEDURE — 99239 HOSP IP/OBS DSCHRG MGMT >30: CPT | Mod: GC

## 2024-05-16 PROCEDURE — 36430 TRANSFUSION BLD/BLD COMPNT: CPT

## 2024-05-16 PROCEDURE — 85027 COMPLETE CBC AUTOMATED: CPT

## 2024-05-16 PROCEDURE — 83010 ASSAY OF HAPTOGLOBIN QUANT: CPT

## 2024-05-16 PROCEDURE — 97161 PT EVAL LOW COMPLEX 20 MIN: CPT

## 2024-05-16 PROCEDURE — 82728 ASSAY OF FERRITIN: CPT

## 2024-05-16 PROCEDURE — 93005 ELECTROCARDIOGRAM TRACING: CPT

## 2024-05-16 PROCEDURE — 93306 TTE W/DOPPLER COMPLETE: CPT

## 2024-05-16 RX ORDER — MAGNESIUM SULFATE 500 MG/ML
2 VIAL (ML) INJECTION ONCE
Refills: 0 | Status: COMPLETED | OUTPATIENT
Start: 2024-05-16 | End: 2024-05-16

## 2024-05-16 RX ORDER — GABAPENTIN 400 MG/1
1 CAPSULE ORAL
Qty: 63 | Refills: 0
Start: 2024-05-16 | End: 2024-06-05

## 2024-05-16 RX ORDER — GABAPENTIN 400 MG/1
100 CAPSULE ORAL ONCE
Refills: 0 | Status: COMPLETED | OUTPATIENT
Start: 2024-05-16 | End: 2024-05-16

## 2024-05-16 RX ADMIN — PANTOPRAZOLE SODIUM 40 MILLIGRAM(S): 20 TABLET, DELAYED RELEASE ORAL at 07:27

## 2024-05-16 RX ADMIN — GABAPENTIN 100 MILLIGRAM(S): 400 CAPSULE ORAL at 12:02

## 2024-05-16 RX ADMIN — Medication 650 MILLIGRAM(S): at 07:27

## 2024-05-16 RX ADMIN — Medication 25 GRAM(S): at 13:49

## 2024-05-16 RX ADMIN — Medication 1 MILLIGRAM(S): at 09:53

## 2024-05-16 RX ADMIN — Medication 100 MILLIGRAM(S): at 09:53

## 2024-05-16 RX ADMIN — LEVETIRACETAM 250 MILLIGRAM(S): 250 TABLET, FILM COATED ORAL at 09:53

## 2024-05-16 RX ADMIN — Medication 1 TABLET(S): at 09:53

## 2024-05-16 NOTE — PROGRESS NOTE ADULT - PROBLEM SELECTOR PLAN 3
Patient wit hgb of 6.8 on admission with MCV of 107, responded to 8.8 after 1U prbc. No active source of bleed. Possibly in the setting of poor oral intake and compliance with vitamin supplementation but hgb downtrended from 11 since March of this year. Hemodynamically stable.     Plan:   - F/u CBC  - Folic acid supplementation provided  - PPI 40 qd BID w/ concern over sig drop of Hgb from earlier this year (11.7 in March)  - F/u B12 and Folic Acid   - Active Type and Screen  - Transfuse if hgb <7

## 2024-05-16 NOTE — PROGRESS NOTE ADULT - PROBLEM SELECTOR PLAN 2
Worsening lower extremity per patient. Was in ED on April 30th for same complaints but apparently worsening now. low concern for HF given TTE largely wnl. Could be likely due to lymphedema vs venous stasis    Plan:   - echo: no remarkable changes from last echo, except as specified above  - Hold off on LE doppler given normalcy two weeks ago Worsening lower extremity per patient. Was in ED on April 30th for same complaints but apparently worsening now. low concern for HF given TTE largely wnl. Could be likely due to lymphedema vs venous stasis    Plan:   - echo: no remarkable changes from last echo, except as specified above  - f/u vascular outpatient  - Hold off on LE doppler given normalcy two weeks ago

## 2024-05-16 NOTE — PROGRESS NOTE ADULT - PROBLEM SELECTOR PLAN 5
Patient with history of seizures, poor compliance with outpatient medications. Has had seizures in past secondarily to alcohol withdrawal vs poor compliance with medications. Last time she visited neurologist ws in 2022. Prescribed Keppra 500 mg BID.    Plan:   - C/w keppra 250 mg AM and 500 mg PM per recent Neurology recs on [facility]  - Seizure precautions  - Fall precautions  - Aspiration precuations Patient with history of seizures, poor compliance with outpatient medications. Has had seizures in past secondarily to alcohol withdrawal vs poor compliance with medications. Last time she visited neurologist ws in 2022. Prescribed Keppra 500 mg BID.    Plan:   - C/w keppra 250 mg AM and 500 mg PM per recent Neurology recs on [facility]  - Seizure precautions  - Fall precautions  - Aspiration precautions

## 2024-05-16 NOTE — PHYSICAL THERAPY INITIAL EVALUATION ADULT - PERTINENT HX OF CURRENT PROBLEM, REHAB EVAL
58 F PMH HTN, alcohol use disorder, seizures, presenting for worsening lower extremity edema found to be hypoxic secondarily to volume overload and anemic requiring one unit of prbc's.

## 2024-05-16 NOTE — PROVIDER CONTACT NOTE (OTHER) - BACKGROUND
Pt is a 58y F PMH HTN, ETOH abuse, seizures admitted for worsening LE edema and anemia requiring one unit of PRBCs.

## 2024-05-16 NOTE — PROGRESS NOTE ADULT - PROBLEM SELECTOR PLAN 7
Unclear if pt is on home meds for HTN as she cannot recall, no antihypertensive meds in surescripts. In 09/2023 pt was discharged on clonidine 0.1mg BID and hydral 25mg QID.    Plan:   - Follow up complete med rec

## 2024-05-16 NOTE — DISCHARGE NOTE PROVIDER - HOSPITAL COURSE
#Discharge: do not delete    MELODY MILLS is 58 F PMH HTN, alcohol use disorder, seizures, presenting for worsening lower extremity edema found to be hypoxic secondarily to volume overload, anemic (s/p 1 u pRBC transfusion), and hypokalemic i/s/o diarrhea admitted to [facility] for further medical management.     Hospital course (by problem):             Patient was discharged to: home     New medications:   Changes to old medications:  Medications that were stopped:    Items to follow up as outpatient:    Physical exam at the time of discharge:       #Discharge: do not delete    MELODY MILLS is 58 F PMH HTN, alcohol use disorder, seizures, presenting for worsening lower extremity edema found to be hypoxic secondarily to volume overload, anemic (s/p 1 u pRBC transfusion), and hypokalemic i/s/o diarrhea admitted to [facility] for further medical management.     Hospital course (by problem):     # Lower extremity edema: Worsening lower extremity per patient. Was in ED on April 30th for same complaints but apparently worsening now. recent LE doppler U/S showed no evidence of DVT, low concern for HF given TTE largely wnl. Could be likely due to lymphedema vs venous stasis  plan  - CONTINUE TYLENOL EVERY 6 HRS PRN FOR PAIN  - advised to elevate legs at home  - f/u vascular outpatient  - PASP 38mmg Hg. f/u cardiology outpatient        # Macrocytic anemia.   ·  Plan: Patient with hgb of 6.8 on admission with MCV of 107, improved to 8.8 after 1U prbc. serum Vitamin B12 and folate wnl. No active source of bleed. Patient denies acute bleed symptoms. Possibly in the setting of poor oral intake but hgb downtrended from 11 since March of this year. Hemodynamically stable.   Plan  - c/w folic acid, vitamin B12 supplementation  - c/w PPI 40 qd BID  - f/u GI outpatient for screening colonoscopy, has never had one per patient      # History of seizures: 2/2 alcohol withdrawal and poor compliance with outpatient medications. last seizure approximately 2 months ago. Last visit to neurologist was in 2022   Plan  - continue Keppra 500 mg daily  - f/u neurologist outpatient      # Problem: History of alcohol use. per patient has 1-2 drinks per night, partner reports consumption of 2 bottles of wine and vodka within 2 nights. Prior admission to MICU due to alcohol withdrawal and subsequent seizures. Serum alcohol level 39 on admission.  Plan  - f/u  for education and resources on substance use       Patient was discharged to: home     New medications:   Changes to old medications:  Medications that were stopped:    Items to follow up as outpatient:    Physical exam at the time of discharge:    GENERAL: NAD, appears comfortable, awake, alert and interactive  NEURO: A&Ox3, no focal deficits, reflexes 2+ throughout, CN 2-12 intact  HEENT: Normocephalic, atraumatic, no conjunctivitis or scleral icterus, oral mucosa moist, no oral lesions noted  NECK: positive JVD, no LAD  CARDIAC: Regular rate and rhythm, +S1/S2, no murmurs/rubs/gallops  PULM: Breathing comfortably on RA, mild crackles RLL  ABDOMEN: Soft, nontender, mildly distended, +bs, no hepatosplenomegaly, no rebound tenderness, no CVA tenderness  : Deferred  MSK: Range of motion grossly intact, no back tenderness  SKIN: Warm and dry, no rashes, lesions  VASC: Cap refil < 2 sec, 2+ peripheral pulses, 2/3+ LE edema b/l, LE tenderness on palpation  Psych: Appropriate affect   #Discharge: do not delete    MELODY MILLS is 58 F PMH HTN, alcohol use disorder, seizures, presenting for worsening lower extremity edema found to be hypoxic secondarily to volume overload, anemic (s/p 1 u pRBC transfusion), and hypokalemic i/s/o diarrhea admitted to [facility] for further medical management.     Hospital course (by problem):     #Lower extremity edema and pain   Worsening lower extremity per patient. Was in ED on April 30th for same complaints but apparently worsening now. Recent LE doppler U/S showed no evidence of DVT, low concern for HF given TTE largely normal, PASP 38 mm Hg. Could be likely due to lymphedema vs venous stasis. 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 Trialed one time dose of gabapentin 100mg.  - continue gabapentin 100mg qd   - advised to elevate legs at home  - f/u vascular outpatient    #Hypokalemia   Presented with K of 2.8 on admission, likely secondary to chronic episodes of diarrhea the one day prior to admission. Repleted with IV and oral potassium, K increased to 3.9 at time of discharge.   - Resolved, further intervention     #Diarrhea  Patient reporting several episodes of diarrhea day prior to admission. Monitored while in the hospital and placed on contact isolation during C. diff rule out and pending GI PCR. However, patient without bowel movement x1 day and medically optimized for discharge so contact isolation, stool studies discontinued.   - No further intervention     #Macrocytic anemia  Patient with Hgb of 6.8 on admission with MCV of 107, improved to 8.8 after 1U packed red blood cells. Serum vitamin B12 and folate wnl. No active source of bleed. Possibly in the setting of poor oral intake but hgb downtrended from 11 since March of this year. Hemodynamically stable. Discharge Hgb 8.3.   Plan  - c/w PPI 40 qd BID  - f/u GI outpatient for screening colonoscopy, has never had one per patient      # History of seizures  Likely 2/2 alcohol withdrawal and poor compliance with outpatient medications. Last seizure approximately 2 months ago. Last visit to neurologist was in 2022   - continue Keppra 250mg in AM and 500 mg in PM daily  - f/u neurologist outpatient    #History of alcohol use  Patient has 1-2 drinks per night, partner reports consumption of 2 bottles of wine and vodka within 2 nights prior to admission. Prior admission to MICU due to alcohol withdrawal and subsequent seizures. Serum alcohol level 39 on admission. Placed on CIWA protocol while inpatient, did not require Ativan.   - f/u  for education and resources on substance use     Patient was discharged to: home     New medications: n/a   Changes to old medications: n/a   Medications that were stopped: n/a     Items to follow up as outpatient: f/u PCP, f/u vascular, f/u neurology     Physical exam at the time of discharge:    GENERAL: NAD, appears comfortable, awake, alert and interactive  NEURO: A&Ox3, no focal deficits, reflexes 2+ throughout, CN 2-12 intact  HEENT: Normocephalic, atraumatic, no conjunctivitis or scleral icterus, oral mucosa moist, no oral lesions noted  NECK: positive JVD, no LAD  CARDIAC: Regular rate and rhythm, +S1/S2, no murmurs/rubs/gallops  PULM: Breathing comfortably on RA, mild crackles RLL  ABDOMEN: Soft, nontender, mildly distended, +bs, no hepatosplenomegaly, no rebound tenderness, no CVA tenderness  : Deferred  MSK: Range of motion grossly intact, no back tenderness  SKIN: Warm and dry, no rashes, lesions  VASC: Cap refil < 2 sec, 2+ peripheral pulses, 2/3+ LE edema b/l, LE tenderness on palpation  Psych: Appropriate affect   #Discharge: do not delete    MELODY MILLS is 58 F PMH HTN, alcohol use disorder, seizures, presenting for worsening lower extremity edema found to be hypoxic secondarily to volume overload, anemic (s/p 1 u pRBC transfusion), and hypokalemic i/s/o diarrhea admitted to [facility] for further medical management.     Hospital course (by problem):     #Lower extremity edema and pain   Worsening lower extremity per patient. Was in ED on April 30th for same complaints but apparently worsening now. Recent LE doppler U/S showed no evidence of DVT, low concern for HF given TTE largely normal, PASP 38 mm Hg. Could be likely due to lymphedema vs venous stasis. 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 Trialed one time dose of gabapentin 100mg.  - start gabapentin 100mg q8h  - advised to elevate legs at home  - f/u vascular outpatient    #Hypokalemia   Presented with K of 2.8 on admission, likely secondary to chronic episodes of diarrhea the one day prior to admission. Repleted with IV and oral potassium, K increased to 3.9 at time of discharge.   - Resolved, further intervention     #Diarrhea  Patient reporting several episodes of diarrhea day prior to admission. Monitored while in the hospital and placed on contact isolation during C. diff rule out and pending GI PCR. However, patient without bowel movement x1 day and medically optimized for discharge so contact isolation, stool studies discontinued.   - No further intervention     #Macrocytic anemia  Patient with Hgb of 6.8 on admission with MCV of 107, improved to 8.8 after 1U packed red blood cells. Serum vitamin B12 and folate wnl. No active source of bleed. Possibly in the setting of poor oral intake but hgb downtrended from 11 since March of this year. Hemodynamically stable. Discharge Hgb 8.3.   Plan  - c/w PPI 40 qd BID  - f/u GI outpatient for screening colonoscopy, has never had one per patient      # History of seizures  Likely 2/2 alcohol withdrawal and poor compliance with outpatient medications. Last seizure approximately 2 months ago. Last visit to neurologist was in 2022   - continue Keppra 250mg in AM and 500 mg in PM daily  - f/u neurologist outpatient    #History of alcohol use  Patient has 1-2 drinks per night, partner reports consumption of 2 bottles of wine and vodka within 2 nights prior to admission. Prior admission to MICU due to alcohol withdrawal and subsequent seizures. Serum alcohol level 39 on admission. Placed on CIWA protocol while inpatient, did not require Ativan.   - f/u  for education and resources on substance use     Patient was discharged to: home     New medications: gabapentin 100mg q8h  Changes to old medications: n/a   Medications that were stopped: n/a     Items to follow up as outpatient: f/u PCP, f/u vascular, f/u neurology     Physical exam at the time of discharge:    GENERAL: NAD, appears comfortable, awake, alert and interactive  NEURO: A&Ox3, no focal deficits, reflexes 2+ throughout, CN 2-12 intact  HEENT: Normocephalic, atraumatic, no conjunctivitis or scleral icterus, oral mucosa moist, no oral lesions noted  NECK: positive JVD, no LAD  CARDIAC: Regular rate and rhythm, +S1/S2, no murmurs/rubs/gallops  PULM: Breathing comfortably on RA, mild crackles RLL  ABDOMEN: Soft, nontender, mildly distended, +bs, no hepatosplenomegaly, no rebound tenderness, no CVA tenderness  : Deferred  MSK: Range of motion grossly intact, no back tenderness  SKIN: Warm and dry, no rashes, lesions  VASC: Cap refil < 2 sec, 2+ peripheral pulses, 2/3+ LE edema b/l, LE tenderness on palpation  Psych: Appropriate affect   #Discharge: do not delete    MELODY MILLS is 58 F PMH HTN, alcohol use disorder, seizures, presenting for worsening lower extremity edema found to be hypoxic secondarily to volume overload, anemic (s/p 1 u pRBC transfusion), and hypokalemic i/s/o diarrhea admitted to [facility] for further medical management.     Hospital course (by problem):     #Lower extremity edema and pain   Worsening lower extremity per patient. Was in ED on April 30th for same complaints but apparently worsening now. Recent LE doppler U/S showed no evidence of DVT, low concern for HF given TTE largely normal, PASP 38 mm Hg. Could be likely due to lymphedema vs venous stasis. 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 Trialed one time dose of gabapentin 100mg.  - start gabapentin 100mg q8h  - initiate physical therapy outpatient for chronic edema   - advised to elevate legs at home  - f/u vascular outpatient    #Hypokalemia   Presented with K of 2.8 on admission, likely secondary to chronic episodes of diarrhea the one day prior to admission. Repleted with IV and oral potassium, K increased to 3.9 at time of discharge.   - Resolved, further intervention     #Diarrhea  Patient reporting several episodes of diarrhea day prior to admission. Monitored while in the hospital and placed on contact isolation during C. diff rule out and pending GI PCR. However, patient without bowel movement x1 day and medically optimized for discharge so contact isolation, stool studies discontinued.   - No further intervention     #Macrocytic anemia  Patient with Hgb of 6.8 on admission with MCV of 107, improved to 8.8 after 1U packed red blood cells. Serum vitamin B12 and folate wnl. No active source of bleed. Possibly in the setting of poor oral intake but hgb downtrended from 11 since March of this year. Hemodynamically stable. Discharge Hgb 8.3.   Plan  - c/w PPI 40 qd BID  - f/u GI outpatient for screening colonoscopy, has never had one per patient      # History of seizures  Likely 2/2 alcohol withdrawal and poor compliance with outpatient medications. Last seizure approximately 2 months ago. Last visit to neurologist was in 2022   - continue Keppra 250mg in AM and 500 mg in PM daily  - f/u neurologist outpatient    #History of alcohol use  Patient has 1-2 drinks per night, partner reports consumption of 2 bottles of wine and vodka within 2 nights prior to admission. Prior admission to MICU due to alcohol withdrawal and subsequent seizures. Serum alcohol level 39 on admission. Placed on CIWA protocol while inpatient, did not require Ativan.   - f/u  for education and resources on substance use     Patient was discharged to: home     New medications: gabapentin 100mg q8h  Changes to old medications: n/a   Medications that were stopped: n/a     Items to follow up as outpatient: f/u PCP, f/u vascular, f/u neurology     Physical exam at the time of discharge:    GENERAL: NAD, appears comfortable, awake, alert and interactive  NEURO: A&Ox3, no focal deficits, reflexes 2+ throughout, CN 2-12 intact  HEENT: Normocephalic, atraumatic, no conjunctivitis or scleral icterus, oral mucosa moist, no oral lesions noted  NECK: positive JVD, no LAD  CARDIAC: Regular rate and rhythm, +S1/S2, no murmurs/rubs/gallops  PULM: Breathing comfortably on RA, mild crackles RLL  ABDOMEN: Soft, nontender, mildly distended, +bs, no hepatosplenomegaly, no rebound tenderness, no CVA tenderness  : Deferred  MSK: Range of motion grossly intact, no back tenderness  SKIN: Warm and dry, no rashes, lesions  VASC: Cap refil < 2 sec, 2+ peripheral pulses, 2/3+ LE edema b/l, LE tenderness on palpation  Psych: Appropriate affect

## 2024-05-16 NOTE — CHART NOTE - NSCHARTNOTEFT_GEN_A_CORE
Patient requires straight cane due to impaired ambulation secondary to neuropathy and lymphedema.  Patient will require straight cane to aid w ambulation.

## 2024-05-16 NOTE — DISCHARGE NOTE PROVIDER - NSDCCPCAREPLAN_GEN_ALL_CORE_FT
PRINCIPAL DISCHARGE DIAGNOSIS  Diagnosis: Peripheral edema  Assessment and Plan of Treatment: Peripheral edema is swelling that is caused by a buildup of fluid. Peripheral edema most often affects the lower legs, ankles, and feet.  Move around often to prevent stiffness and to reduce swelling. Do not sit or stand for long periods of time.  Pay attention to any changes in your symptoms.  Contact a health care provider if you have edema that starts suddenly or is getting worse, especially if you are pregnant or have a medical condition.  Get help right away if you develop shortness of breath, especially when lying down.        SECONDARY DISCHARGE DIAGNOSES  Diagnosis: Diarrhea  Assessment and Plan of Treatment: Diarrhea is frequent loose or watery bowel movements that has many causes. Diarrhea can make you feel weak and cause you to become dehydrated. Diarrhea typically lasts 2–3 days, but can last longer if it is a sign of something more serious. Drink clear fluids to prevent dehydration. Eat bland, easy-to-digest foods as tolerated.   SEEK IMMEDIATE MEDICAL CARE IF YOU HAVE ANY OF THE FOLLOWING SYMPTOMS: high fevers, lightheadedness/dizziness, chest pain, black or bloody stools, shortness of breath, severe abdominal or back pain, or any signs of dehydration.    Diagnosis: Anemia  Assessment and Plan of Treatment: When you came to the hospital you were found to have anemia. Anemia is the medical term for when a person has too few red blood cells. Red blood cells carry oxygen throughout your body; if you have too few red blood cells, your body is not able to get all the oxygen it needs. Most people with anemia have no symptoms, however common symptoms include: increased fatigue, shortness of breath, tiring easily, and headaches. If you experience any of these symptoms, or have any episodes of bloody vomit or bloody stool (can be red stool or black stool), please see your primary care provider or go to your emergency room for further evaluation.     PRINCIPAL DISCHARGE DIAGNOSIS  Diagnosis: Peripheral edema  Assessment and Plan of Treatment: Peripheral edema is swelling that is caused by a buildup of fluid. Peripheral edema most often affects the lower legs, ankles, and feet.  Move around often and elevate your legs while lying down to prevent stiffness and to reduce swelling. Do not sit or stand for long periods of time.  Pay attention to any changes in your symptoms.  Contact a health care provider if you have edema that starts suddenly or is getting worse, especially if you are pregnant or have a medical condition.  Get help right away if you develop shortness of breath, especially when lying down.        SECONDARY DISCHARGE DIAGNOSES  Diagnosis: Anemia  Assessment and Plan of Treatment: When you came to the hospital you were found to have anemia. You were given one unit of blood to increase the amount of red blood cells in your body. Anemia is the medical term for when a person has too few red blood cells. Red blood cells carry oxygen throughout your body; if you have too few red blood cells, your body is not able to get all the oxygen it needs. Most people with anemia have no symptoms, however common symptoms include: increased fatigue, shortness of breath, tiring easily, and headaches. If you experience any of these symptoms, or have any episodes of bloody vomit or bloody stool (can be red stool or black stool), please see your primary care provider or go to your emergency room for further evaluation.    Diagnosis: Diarrhea  Assessment and Plan of Treatment: Diarrhea is frequent loose or watery bowel movements that has many causes. Diarrhea can make you feel weak and cause you to become dehydrated. Diarrhea typically lasts 2–3 days, but can last longer if it is a sign of something more serious. Drink clear fluids to prevent dehydration. Eat bland, easy-to-digest foods as tolerated.   SEEK IMMEDIATE MEDICAL CARE IF YOU HAVE ANY OF THE FOLLOWING SYMPTOMS: high fevers, lightheadedness/dizziness, chest pain, black or bloody stools, shortness of breath, severe abdominal or back pain, or any signs of dehydration.     PRINCIPAL DISCHARGE DIAGNOSIS  Diagnosis: Peripheral edema  Assessment and Plan of Treatment: Peripheral edema is swelling that is caused by a buildup of fluid. Peripheral edema most often affects the lower legs, ankles, and feet.  Move around often and elevate your legs while lying down to prevent stiffness and to reduce swelling. Do not sit or stand for long periods of time. Pay attention to any changes in your symptoms. You also experienced shooting pain of your legs for which you were trialed on a medication called gabapentin in the hospital. You experienced relief with the medication so you may continue to take gabapentin 100mg evry 8 hours. Please follow-up with your primary care provider within 1-2 weeks after discharge.   Contact a health care provider if you have edema that starts suddenly or is getting worse, especially if you are pregnant or have a medical condition. Get help right away if you develop shortness of breath, especially when lying down.        SECONDARY DISCHARGE DIAGNOSES  Diagnosis: Hypokalemia  Assessment and Plan of Treatment: Hypokalemia means low blood potassium levels. Your body needs potassium to function correctly. It gets potassium through the food you eat. Hypokalemia is often caused by an excessive loss of potassium in your digestive tract due to vomiting, diarrhea or laxative use. Other causes include certain medications and some adrenal and genetic conditions. You need potassium to keep your muscles, nerves and heart working well. You also need potassium for a healthy digestive system and bone health. Low levels of potassium can affect these important functions in your body. Over time, low levels of potassium in your body can cause effects such as abnormal heart rhythms, muscle weakness and even paralysis. While you were in the hospital, your low potassium levels were treated with both oral and IV potassium repletion. Your blood work was checked daily and you were found to have a normal potassium level of 3.9 upon discharge. Please follow-up with your PCP as scheduled in the discharge paperwork.    Diagnosis: Anemia  Assessment and Plan of Treatment: When you came to the hospital you were found to have anemia. You were given one unit of blood to increase the amount of red blood cells in your body. Anemia is the medical term for when a person has too few red blood cells. Red blood cells carry oxygen throughout your body; if you have too few red blood cells, your body is not able to get all the oxygen it needs. Most people with anemia have no symptoms, however common symptoms include: increased fatigue, shortness of breath, tiring easily, and headaches. If you experience any of these symptoms, or have any episodes of bloody vomit or bloody stool (can be red stool or black stool), please see your primary care provider or go to your emergency room for further evaluation.    Diagnosis: Diarrhea  Assessment and Plan of Treatment: Diarrhea is frequent loose or watery bowel movements that has many causes. Diarrhea can make you feel weak and cause you to become dehydrated. Diarrhea typically lasts 2–3 days, but can last longer if it is a sign of something more serious. Drink clear fluids to prevent dehydration. Eat bland, easy-to-digest foods as tolerated.   SEEK IMMEDIATE MEDICAL CARE IF YOU HAVE ANY OF THE FOLLOWING SYMPTOMS: high fevers, lightheadedness/dizziness, chest pain, black or bloody stools, shortness of breath, severe abdominal or back pain, or any signs of dehydration.

## 2024-05-16 NOTE — PROGRESS NOTE ADULT - PROBLEM SELECTOR PLAN 9
F: None  E: Replete as needed  N: DASH Diet  DVT Proph: Hold for now  GI Proph: PPI BID  Code Status: Full Code  Dispo: [facility]

## 2024-05-16 NOTE — PROGRESS NOTE ADULT - ASSESSMENT
58 F PMH HTN, alcohol use disorder, seizures, presenting for worsening lower extremity edema found to be hypoxic secondarily to volume overload and anemic requiring one unit of prbc's. 
58 F PMH HTN, alcohol use disorder, seizures, presenting for worsening lower extremity edema found to be hypoxic secondarily to volume overload, anemic (s/p 1 u pRBC transfusion), and hypokalemic i/s/o diarrhea admitted to [facility] for further medical management.

## 2024-05-16 NOTE — PHYSICAL THERAPY INITIAL EVALUATION ADULT - GENERAL OBSERVATIONS, REHAB EVAL
Chief Complaint   Patient presents with   • Fever     fever started tonight, tmax 103       BIB mother, mom gave tylenol @ 2330 for fever 103. Some congestion noted per mother. Pt hasn't been taking as much PO, still making wet diapers. Pt in NAD, fontanels soft/flat.     Positive COVID screening d/t fever. Mom updated on triage process, no questions ATT.    Vitals:    02/05/21 0004   BP: 73/47   Pulse: (!) 171   Resp: 44   Temp: (!) 38.7 °C (101.7 °F)   SpO2: 98%     
Pt received semi supine in bed in NAD, +HEP lock, RN Ty cleared Pt for PT evaluation. Pt is agreeable and motivated to participate in PT treatment.

## 2024-05-16 NOTE — DISCHARGE NOTE PROVIDER - PROVIDER TOKENS
FREE:[LAST:[[place]],FIRST:[Donna],PHONE:[phone]],FAX:[(   )    -],FOLLOWUP:[2 weeks]] FREE:[LAST:[[place]],FIRST:[Donna],PHONE:[phone]],FAX:[(   )    -],FOLLOWUP:[2 weeks]],PROVIDER:[TOKEN:[[id number]],SCHEDULEDAPPT:[05/20/2024],SCHEDULEDAPPTTIME:[01:00 PM],ESTABLISHEDPATIENT:[T]] PROVIDER:[TOKEN:[[id number]],SCHEDULEDAPPT:[05/20/2024],SCHEDULEDAPPTTIME:[01:00 PM],ESTABLISHEDPATIENT:[T]],FREE:[LAST:[[place]],FIRST:[Donna],PHONE:[phone]],FAX:[(   )    -],FOLLOWUP:[2 weeks]],PROVIDER:[TOKEN:[[id number]]]

## 2024-05-16 NOTE — PROGRESS NOTE ADULT - ATTENDING COMMENTS
Pt. seen and examined by me earlier today; I have read Dr. De Souza's note, I agree w/ her findings and plan of care as documented; Pt. c/o diarrhea O/N, but since resolved; Pt.'s Hb stable, no bleeding sx; hypokalemia repleted; TTE reviewed, unremarkable; recent Doppler U/S negative for DVT; suspect B/L LE edema is due to venous insufficiency, intermittent pain sx c/w peripheral neuropathy, likely due to EtOH; cont. leg elevation, trial Neurontin; hold further diuretics in setting of hypokalemia; will refer for outpatient Vascular f/u; Pt. counseled on EtOH cessation

## 2024-05-16 NOTE — PROGRESS NOTE ADULT - SUBJECTIVE AND OBJECTIVE BOX
******INCOMPLETE******    OVERNIGHT EVENTS/INTERVAL HPI: Per nurse, multiple episodes of dairrhea. Tylenol for LE pain, ordered DVT studies. endorses >10 stools in 24hrs, ordered GI. PCR and C. diff.     SUBJECTIVE:     OBJECTIVE:  Vital Signs Last 24 Hrs  T(C): 36.8 (15 May 2024 20:40), Max: 36.8 (15 May 2024 20:40)  T(F): 98.2 (15 May 2024 20:40), Max: 98.2 (15 May 2024 20:40)  HR: 86 (15 May 2024 20:40) (86 - 89)  BP: 139/85 (15 May 2024 20:40) (130/71 - 139/85)  BP(mean): --  RR: 17 (15 May 2024 20:40) (16 - 17)  SpO2: 96% (15 May 2024 20:40) (94% - 96%)    Parameters below as of 15 May 2024 20:40  Patient On (Oxygen Delivery Method): room air      I&O's Detail    14 May 2024 07:01  -  15 May 2024 07:00  --------------------------------------------------------  IN:    IV PiggyBack: 50 mL    PRBCs (Packed Red Blood Cells): 300 mL  Total IN: 350 mL    OUT:    Voided (mL): 2325 mL  Total OUT: 2325 mL    Total NET: -1975 mL    Physical Exam:  GENERAL: NAD, appears comfortable, awake, alert and interactive  NEURO: A&Ox3, no focal deficits, reflexes 2+ throughout, CN 2-12 intact  HEENT: Normocephalic, atraumatic, no conjunctivitis or scleral icterus, oral mucosa moist, no oral lesions noted  NECK: positive JVD, no LAD  CARDIAC: Regular rate and rhythm, +S1/S2, no murmurs/rubs/gallops  PULM: Breathing on 4L NC, mild crackles RLL  ABDOMEN: Soft, nontender, nondistended, +bs, no hepatosplenomegaly, no rebound tenderness or fluid wave, no CVA tenderness  : Deferred  MSK: Range of motion grossly intact, no back tenderness  SKIN: Warm and dry, no rashes, lesions  VASC: Cap refil < 2 sec, 2+ peripheral pulses, 2/3+ LE edema b/l, LE tenderness on palpation  Psych: Appropriate affect    Medications:  MEDICATIONS  (STANDING):  folic acid 1 milliGRAM(s) Oral daily  levETIRAcetam 500 milliGRAM(s) Oral every 24 hours  levETIRAcetam 250 milliGRAM(s) Oral every 24 hours  multivitamin 1 Tablet(s) Oral daily  pantoprazole    Tablet 40 milliGRAM(s) Oral before breakfast  thiamine 100 milliGRAM(s) Oral daily    MEDICATIONS  (PRN):  acetaminophen     Tablet .. 650 milliGRAM(s) Oral every 6 hours PRN Mild Pain (1 - 3)  LORazepam   Injectable 2 milliGRAM(s) IV Push every 6 hours PRN CIWA-Ar score 8 or greater      Labs:       ******INCOMPLETE******    OVERNIGHT EVENTS/INTERVAL HPI: Per nurse, multiple episodes of diarrhea. Tylenol for LE pain. endorses >10 stools in 24hrs, ordered GI PCR and C. diff. K of 3.2 pending BMP after 40 meq at 6 PM.     SUBJECTIVE: Patient seen and examined at bedside. Reports frequent watery stools a day after admission, with c/o of brown loose stools prior to admission at home. Describes crampy pain with mild relief after BM. Denies blood or pus in stool, denies fever, chills, N/V. Patient continues to c/o b/l LE edema with itching sensation extending upward thigh. Reports LE pain relief with tylenol. Remaining ROS negative.     OBJECTIVE:  Vital Signs Last 24 Hrs  T(C): 36.8 (15 May 2024 20:40), Max: 36.8 (15 May 2024 20:40)  T(F): 98.2 (15 May 2024 20:40), Max: 98.2 (15 May 2024 20:40)  HR: 86 (15 May 2024 20:40) (86 - 89)  BP: 139/85 (15 May 2024 20:40) (130/71 - 139/85)  BP(mean): --  RR: 17 (15 May 2024 20:40) (16 - 17)  SpO2: 96% (15 May 2024 20:40) (94% - 96%)    Parameters below as of 15 May 2024 20:40  Patient On (Oxygen Delivery Method): room air      I&O's Detail    14 May 2024 07:01  -  15 May 2024 07:00  --------------------------------------------------------  IN:    IV PiggyBack: 50 mL    PRBCs (Packed Red Blood Cells): 300 mL  Total IN: 350 mL    OUT:    Voided (mL): 2325 mL  Total OUT: 2325 mL    Total NET: -1975 mL    Physical Exam:  GENERAL: NAD, appears comfortable, awake, alert and interactive  NEURO: A&Ox3, no focal deficits, reflexes 2+ throughout, CN 2-12 intact  HEENT: Normocephalic, atraumatic, no conjunctivitis or scleral icterus, oral mucosa moist, no oral lesions noted  NECK: positive JVD, no LAD  CARDIAC: Regular rate and rhythm, +S1/S2, no murmurs/rubs/gallops  PULM: Breathing comfortably on RA, mild crackles RLL  ABDOMEN: Soft, nontender, mildly distended, +bs, no hepatosplenomegaly, no rebound tenderness, no CVA tenderness  : Deferred  MSK: Range of motion grossly intact, no back tenderness  SKIN: Warm and dry, no rashes, lesions  VASC: Cap refil < 2 sec, 2+ peripheral pulses, 2/3+ LE edema b/l, LE tenderness on palpation  Psych: Appropriate affect    Medications:  MEDICATIONS  (STANDING):  folic acid 1 milliGRAM(s) Oral daily  levETIRAcetam 500 milliGRAM(s) Oral every 24 hours  levETIRAcetam 250 milliGRAM(s) Oral every 24 hours  multivitamin 1 Tablet(s) Oral daily  pantoprazole    Tablet 40 milliGRAM(s) Oral before breakfast  thiamine 100 milliGRAM(s) Oral daily    MEDICATIONS  (PRN):  acetaminophen     Tablet .. 650 milliGRAM(s) Oral every 6 hours PRN Mild Pain (1 - 3)  LORazepam   Injectable 2 milliGRAM(s) IV Push every 6 hours PRN CIWA-Ar score 8 or greater      Labs:       OVERNIGHT EVENTS/INTERVAL HPI: Per nurse, multiple episodes of diarrhea. Tylenol for LE pain. endorses >10 stools in 24hrs, ordered GI PCR and C. diff. K of 3.2 pending BMP after 40 meq at 6 PM.     SUBJECTIVE: Patient seen and examined at bedside. Reports frequent watery stools a day after admission, with c/o of brown loose stools prior to admission at home. Describes crampy pain with mild relief after BM. Denies blood or pus in stool, denies fever, chills, N/V. Patient continues to c/o b/l LE edema with itching sensation extending upward thigh. Reports LE pain relief with tylenol. Remaining ROS negative.     OBJECTIVE:  Vital Signs Last 24 Hrs  T(C): 36.8 (15 May 2024 20:40), Max: 36.8 (15 May 2024 20:40)  T(F): 98.2 (15 May 2024 20:40), Max: 98.2 (15 May 2024 20:40)  HR: 86 (15 May 2024 20:40) (86 - 89)  BP: 139/85 (15 May 2024 20:40) (130/71 - 139/85)  BP(mean): --  RR: 17 (15 May 2024 20:40) (16 - 17)  SpO2: 96% (15 May 2024 20:40) (94% - 96%)    Parameters below as of 15 May 2024 20:40  Patient On (Oxygen Delivery Method): room air      I&O's Detail    14 May 2024 07:01  -  15 May 2024 07:00  --------------------------------------------------------  IN:    IV PiggyBack: 50 mL    PRBCs (Packed Red Blood Cells): 300 mL  Total IN: 350 mL    OUT:    Voided (mL): 2325 mL  Total OUT: 2325 mL    Total NET: -1975 mL    Physical Exam:  GENERAL: NAD, appears comfortable, awake, alert and interactive  NEURO: A&Ox3, no focal deficits, reflexes 2+ throughout, CN 2-12 intact  HEENT: Normocephalic, atraumatic, no conjunctivitis or scleral icterus, oral mucosa moist, no oral lesions noted  NECK: positive JVD, no LAD  CARDIAC: Regular rate and rhythm, +S1/S2, no murmurs/rubs/gallops  PULM: Breathing comfortably on RA, mild crackles RLL  ABDOMEN: Soft, nontender, mildly distended, +bs, no hepatosplenomegaly, no rebound tenderness, no CVA tenderness  : Deferred  MSK: Range of motion grossly intact, no back tenderness  SKIN: Warm and dry, no rashes, lesions  VASC: Cap refil < 2 sec, 2+ peripheral pulses, 2/3+ LE edema b/l, LE tenderness on palpation  Psych: Appropriate affect    Medications:  MEDICATIONS  (STANDING):  folic acid 1 milliGRAM(s) Oral daily  levETIRAcetam 500 milliGRAM(s) Oral every 24 hours  levETIRAcetam 250 milliGRAM(s) Oral every 24 hours  multivitamin 1 Tablet(s) Oral daily  pantoprazole    Tablet 40 milliGRAM(s) Oral before breakfast  thiamine 100 milliGRAM(s) Oral daily    MEDICATIONS  (PRN):  acetaminophen     Tablet .. 650 milliGRAM(s) Oral every 6 hours PRN Mild Pain (1 - 3)  LORazepam   Injectable 2 milliGRAM(s) IV Push every 6 hours PRN CIWA-Ar score 8 or greater      Labs:                          8.3    7.35  )-----------( 326      ( 16 May 2024 11:52 )             27.0     05-16    138  |  101  |  4<L>  ----------------------------<  144<H>  3.9   |  24  |  0.40<L>    Ca    9.0      16 May 2024 11:52  Phos  3.8     05-16  Mg     1.7     05-16    TPro  6.0  /  Alb  3.2<L>  /  TBili  0.4  /  DBili  x   /  AST  46<H>  /  ALT  28  /  AlkPhos  107  05-15      Urinalysis Basic - ( 16 May 2024 11:52 )    Color: x / Appearance: x / SG: x / pH: x  Gluc: 144 mg/dL / Ketone: x  / Bili: x / Urobili: x   Blood: x / Protein: x / Nitrite: x   Leuk Esterase: x / RBC: x / WBC x   Sq Epi: x / Non Sq Epi: x / Bacteria: x                RADIOLOGY, EKG & ADDITIONAL TESTS: Reviewed.

## 2024-05-16 NOTE — DISCHARGE NOTE PROVIDER - CARE PROVIDER_API CALL
Donna Cohn  Phone: [phone]  Fax: (   )    -  Follow Up Time: 2 weeks   Donna Cohn  Phone: [phone]  Fax: (   )    -  Follow Up Time: 2 weeks    YAO HEATH  Established Patient  Scheduled Appointment: 05/20/2024 01:00 PM   YAO HEATH  Established Patient  Scheduled Appointment: 05/20/2024 01:00 PM    Donna Cohn  Phone: [phone]  Fax: (   )    -  Follow Up Time: 2 weeks    Duncan Blanco  Vascular Surgery  [address] [address]  Phone: [phone]  Fax: [phone]  Follow Up Time:

## 2024-05-16 NOTE — CONSULT NOTE ADULT - ASSESSMENT
I M    58 y o F PMH HTN, alcohol use disorder, seizures, presenting for worsening lower extremity edema found to be hypoxic secondarily to volume overload and anemic requiring one unit of prbc's.     Problem/Plan - 1:  ·  Problem: Acute hypoxic respiratory failure.   ·  Plan: Patient requiring 4L NC while breathing at 89% on RA. Chest xray evidencing increased vascular congestion and edema comparably to most recent chest xray. BNP elevated to 933. LE edema apparent b/l. Echo obtained at last visit evidencing EF of 63%.      Plan:   - hold off Lasix 40 mg IV x1  - pain management:   - repeat echo no remarkable findings, PASP 38mmg Hg. f/u cardiology outpatient  - wean supplemental oxygen as tolerated  - Strict urinary output.    Problem/Plan - 2:  ·  Problem: Lower extremity edema.   ·  Plan: Worsening lower extremity per patient. Was in ED on April 30th for same complaints but apparently worsening now. low concern for HF given TTE largely wnl. Could be likely due to lymphedema vs venous stasis    Plan:   - echo: no remarkable changes from last echo, except as specified above  - Hold off on LE doppler given normalcy two weeks ago.    Problem/Plan - 3:  ·  Problem: Macrocytic anemia.   ·  Plan: Patient wit hgb of 6.8 on admission with MCV of 107, responded to 8.8 after 1U prbc. No active source of bleed. Possibly in the setting of poor oral intake and compliance with vitamin supplementation but hgb downtrended from 11 since March of this year. Hemodynamically stable.     Plan:   - F/u CBC  - Folic acid supplementation provided  - PPI 40 qd BID w/ concern over sig drop of Hgb from earlier this year (11.7 in March)  - F/u B12 and Folic Acid   - Active Type and Screen  - Transfuse if hgb <7.    Problem/Plan - 4:  ·  Problem: Hypomagnesemia.   ·  Plan: Patient with hypomagnesia to 1.5 on admission.     Plan:   - Appropriate magnesium repletion.    Problem/Plan - 5:  ·  Problem: History of seizures.   ·  Plan: Patient with history of seizures, poor compliance with outpatient medications. Has had seizures in past secondarily to alcohol withdrawal vs poor compliance with medications. Last time she visited neurologist ws in 2022. Prescribed Keppra 500 mg BID.    Plan:   - C/w keppra 250 mg AM and 500 mg PM per recent Neurology recs on HIE  - Seizure precautions  - Fall precautions  - Aspiration precuations.    Problem/Plan - 6:  ·  Problem: History of alcohol use.   ·  Plan: Patient with history of alcohol use. 1-2 drinks per night. Hx of alcohol withdrawal with seizures, admitted to MICU in September 2023. Continues to drink, blood alcohol level 39 on admission.     Plan:  - CIWA protocol with ativan prn for CIWA >8  - f/u  for education on substance use   - Thiamine   - Folic acid  - Multivitamin.    Problem/Plan - 7:  ·  Problem: HTN (hypertension).   ·  Plan: Unclear if pt is on home meds for HTN as she cannot recall, no antihypertensive meds in surescripts. In 09/2023 pt was discharged on clonidine 0.1mg BID and hydral 25mg QID.    Plan:   - Follow up complete med rec.    Problem/Plan - 8:  ·  Problem: Prophylactic measure.   ·  Plan: F: None  E: Replete as needed  N: DASH Diet  DVT Proph: Hold for now  GI Proph: PPI BID  Code Status: Full Code  Dispo: [facility].

## 2024-05-16 NOTE — PROGRESS NOTE ADULT - PROBLEM SELECTOR PLAN 8
Patient requiring 4L NC while breathing at 89% on RA. Chest xray evidencing increased vascular congestion and edema comparably to most recent chest xray. BNP elevated to 933. LE edema apparent b/l. Echo obtained at last visit evidencing EF of 63%. S/p IV lasix 40mg x1 in ED. TTE with PASP 38 mmHg, no other remarkable findings. Weaned off supplemental oxygen and satting well on room air.       Plan:   - Resolved
F: None  E: Replete as needed  N: DASH Diet  DVT Proph: Hold for now  GI Proph: PPI BID  Code Status: Full Code  Dispo: [facility]

## 2024-05-16 NOTE — DISCHARGE NOTE PROVIDER - NSDCFUADDAPPT_GEN_ALL_CORE_FT
Please follow-up with vascular surgery outpatient to establish long-term care to address your chronic swelling of lower legs. You may call Dr. Duncan Blanco's office (contact info listed in discharge paperwork) to set up a new patient appointment when one is available.     If interested in physical therapy for chronic swelling, you may try going to [facility]. You can call [phone] to discuss setting up an appointment.

## 2024-05-16 NOTE — PROVIDER CONTACT NOTE (OTHER) - ASSESSMENT
Pt comfortable and lying in bed; no IV medications o/n; VSS; risks explained and pt showed understanding

## 2024-05-16 NOTE — PROGRESS NOTE ADULT - PROBLEM SELECTOR PLAN 4
Patient with hypomagnesia to 1.5 on admission.     Plan:   - Appropriate magnesium repletion
Patient with hypomagnesia to 1.5 on admission.     Plan:   - Appropriate magnesium repletion
10-Aug-2022 14:47

## 2024-05-16 NOTE — DISCHARGE NOTE NURSING/CASE MANAGEMENT/SOCIAL WORK - PATIENT PORTAL LINK FT
You can access the FollowMyHealth Patient Portal offered by [facility] by registering at the following website: [email or web address]. By joining [employer]’s FollowMyHealth portal, you will also be able to view your health information using other applications (apps) compatible with our system.

## 2024-05-16 NOTE — PHYSICAL THERAPY INITIAL EVALUATION ADULT - ADDITIONAL COMMENTS
Pt lives with her partner in a 6th floor apartment building (elevator access) with 1 small MATTHEW. Pt reports being independent for all ADLs and functional mobility at baseline without use of AD. She has a tub shower with no grab bars or shower chair. She does not have any home health aide services.

## 2024-05-16 NOTE — PROGRESS NOTE ADULT - PROBLEM SELECTOR PLAN 6
Patient with history of alcohol use. 1-2 drinks per night. Hx of alcohol withdrawal with seizures, admitted to MICU in September 2023. Continues to drink, blood alcohol level 39 on admission.     Plan:  - CIWA protocol with ativan prn for CIWA >8  - f/u  for education on substance use   - Thiamine   - Folic acid  - Multivitamin

## 2024-05-16 NOTE — DISCHARGE NOTE PROVIDER - NSDCCPTREATMENT_GEN_ALL_CORE_FT
PRINCIPAL PROCEDURE  Procedure: XR chest, 1 view  Findings and Treatment:   < end of copied text >  PROCEDURE DATE:  05/14/2024    INTERPRETATION:  AP chest.  CLINICAL INDICATION: Shortness of breath.  IMPRESSION: The heart is normal in size. Minimal reticular opacities in   the lung suggesting minimal pulmonary edema. Mild scoliosis of the spine.  --- End of Report ---< from: Xray Chest 1 View AP/PA (05.14.24 @ 18:19) >        SECONDARY PROCEDURE  Procedure: Transthoracic echocardiography (TTE)  Findings and Treatment:   < end of copied text >  CONCLUSIONS:   1. Normal left ventricular size and systolic function.   2. Normal right ventricular size and systolic function.   3. Moderately dilated left atrium.   4. No significant valvular disease.   5. Pulmonary hypertension present, pulmonary artery systolic pressure is   38 mmHg.   6. No pericardial effusion.   7. Compared to the previous TTE performed on 3/4/2024, pulmonary artery   systolic pressure is higher on this study.< from: TTE Echo Complete w/o Contrast w/ Doppler (05.15.24 @ 10:29) >       PRINCIPAL PROCEDURE  Procedure: XR chest, 1 view  Findings and Treatment: PROCEDURE DATE:  05/14/2024    INTERPRETATION:  AP chest.  CLINICAL INDICATION: Shortness of breath.  IMPRESSION: The heart is normal in size. Minimal reticular opacities in   the lung suggesting minimal pulmonary edema. Mild scoliosis of the spine.        SECONDARY PROCEDURE  Procedure: Transthoracic echocardiography (TTE)  Findings and Treatment: CONCLUSIONS:   1. Normal left ventricular size and systolic function.   2. Normal right ventricular size and systolic function.   3. Moderately dilated left atrium.   4. No significant valvular disease.   5. Pulmonary hypertension present, pulmonary artery systolic pressure is   38 mmHg.   6. No pericardial effusion.   7. Compared to the previous TTE performed on 3/4/2024, pulmonary artery   systolic pressure is higher on this study.

## 2024-05-16 NOTE — PHYSICAL THERAPY INITIAL EVALUATION ADULT - GAIT DEVIATIONS NOTED, PT EVAL
good steadiness and gait sequencing demonstrated with use of straight cane/decreased tommie/decreased step length

## 2024-05-16 NOTE — CONSULT NOTE ADULT - SUBJECTIVE AND OBJECTIVE BOX
Patient is a 58y old  Female who presents with a chief complaint of Worsening bilateral lower extremity edema (16 May 2024 05:49)      HPI:  58 F PMH HTN, alcohol use disorder, seizures, presenting for worsening lower extremity edema found to be hypoxic and anemic requiring one unit of prbc's. Patient is not a great historian but states that worsening swelling of her lower extremities as well as increasing pain is what ultimately brought her into the hospital. Her partner, Garret, was contacted as well and he had stated that she was at dentist office for a dental appointment but secondarily to complaints of swelling and pain, the dentist told her to go to the ED. She was recently in our ED at end of April with same concern, lower extremity dopplers were performed with no evidence of DVT's and echo performed with EF of 63%. Was told to go to cardiologist but patient states that she never did. Denies orthopnea, increased sob on activity. Does not elevate legs and does not believe she took any pain medications to help relieve the pain. Has never required supplemental oxygen before. With regards to her anemia,  denies any hemoptysis, melena, brbpr, hematuria. Has poor dietary habits according to her and her partner. She does not remember if she had any prior transfusions in the past. With regards to alcohol use, patient states that she only drinks one or two glasses of vodka or wine per night while her partner states that he believes she is finished with 2 bottles of wine and vodka after two nights. Last drink was last night per patient. Is not aware of prior alcohol withdrawal but partner suggests that she has history. States to be following with neurologist outpatient with regards to her seizures, is prescribed Keppra 250 in the AM and  500 mg in the PM but states she normally only takes 500 mg once a day. Last seizure per her partner was 2 months ago but patient does not remember. Her partner states that seizures began over two years ago when she had a CVA.     Patient was also recently admitted briefly in March for metabolic encephalopathy in the setting of possible seizures secondarily to poor medication compliance as well as electrolyte deficiencies iso poor PO intake. Was admitted to the MICU for seizures secondarily to alcohol withdrawal in September of 2023. In July of 2022 admitted for alcohol withdrawal.     In the ED:  Initial vital signs: T: 98.3 F, HR: 109, BP: 149/68, R: 16, SpO2: 95 -> 85% on RA  Labs: significant for hgb of 6.8, MCV of 107.7, magnesium of 1.5, bnp of 933.  Imaging: None  CXR: Vascualr congestion and pulmonary edema  EKG: NSR  Medications: Keppra 500 mg x1  Consults: none  (14 May 2024 20:51)    PAST MEDICAL & SURGICAL HISTORY:  HTN (hypertension)      Epilepsy      ETOH abuse      No significant past surgical history        MEDICATIONS  (STANDING):  folic acid 1 milliGRAM(s) Oral daily  levETIRAcetam 500 milliGRAM(s) Oral every 24 hours  levETIRAcetam 250 milliGRAM(s) Oral every 24 hours  multivitamin 1 Tablet(s) Oral daily  pantoprazole    Tablet 40 milliGRAM(s) Oral before breakfast  thiamine 100 milliGRAM(s) Oral daily    MEDICATIONS  (PRN):  acetaminophen     Tablet .. 650 milliGRAM(s) Oral every 6 hours PRN Mild Pain (1 - 3)  LORazepam   Injectable 2 milliGRAM(s) IV Push every 6 hours PRN CIWA-Ar score 8 or greater          FAMILY HISTORY:  No pertinent family history in first degree relatives        CBC Full  -  ( 15 May 2024 05:30 )  WBC Count : 8.63 K/uL  RBC Count : 2.86 M/uL  Hemoglobin : 8.8 g/dL  Hematocrit : 28.5 %  Platelet Count - Automated : 419 K/uL  Mean Cell Volume : 99.7 fl  Mean Cell Hemoglobin : 30.8 pg  Mean Cell Hemoglobin Concentration : 30.9 gm/dL  Auto Neutrophil # : 6.23 K/uL  Auto Lymphocyte # : 1.09 K/uL  Auto Monocyte # : 1.20 K/uL  Auto Eosinophil # : 0.01 K/uL  Auto Basophil # : 0.07 K/uL  Auto Neutrophil % : 72.3 %  Auto Lymphocyte % : 12.6 %  Auto Monocyte % : 13.9 %  Auto Eosinophil % : 0.1 %  Auto Basophil % : 0.8 %      05-15    139  |  98  |  3<L>  ----------------------------<  213<H>  3.2<L>   |  28  |  0.52    Ca    9.0      15 May 2024 14:00  Phos  3.2     05-15  Mg     2.3     05-15    TPro  6.0  /  Alb  3.2<L>  /  TBili  0.4  /  DBili  x   /  AST  46<H>  /  ALT  28  /  AlkPhos  107  05-15      Urinalysis Basic - ( 15 May 2024 14:00 )    Color: x / Appearance: x / SG: x / pH: x  Gluc: 213 mg/dL / Ketone: x  / Bili: x / Urobili: x   Blood: x / Protein: x / Nitrite: x   Leuk Esterase: x / RBC: x / WBC x   Sq Epi: x / Non Sq Epi: x / Bacteria: x        Radiology :     < from: Xray Chest 1 View AP/PA (05.14.24 @ 18:19) >    ACC: [id number] EXAM:  XR CHEST AP OR PA 1V   ORDERED BY: VINCE DIANE     PROCEDURE DATE:  05/14/2024          INTERPRETATION:  AP chest.    CLINICAL INDICATION: Shortness of breath.    IMPRESSION: The heart is normal in size. Minimal reticular opacities in   the lung suggesting minimal pulmonary edema. Mild scoliosis of the spine.    < end of copied text >          Review of Systems : per HPI         Vital Signs Last 24 Hrs  T(C): 36.8 (16 May 2024 05:49), Max: 36.8 (15 May 2024 20:40)  T(F): 98.2 (16 May 2024 05:49), Max: 98.2 (15 May 2024 20:40)  HR: 89 (16 May 2024 05:49) (86 - 89)  BP: 136/73 (16 May 2024 05:49) (130/71 - 139/85)  BP(mean): --  RR: 18 (16 May 2024 05:49) (16 - 18)  SpO2: 92% (16 May 2024 05:49) (92% - 96%)    Parameters below as of 16 May 2024 05:49  Patient On (Oxygen Delivery Method): room air            Physical Exam:  58 y o woman ,  lying comfortably in semi Bateman's position , awake , alert , no acute complaints , feels tired     Head: normocephalic , atraumatic    Eyes: PERRLA , EOMI , no nystagmus , sclera anicteric    ENT / FACE: neg nasal discharge , uvula midline , no oropharyngeal erythema / exudate    Neck: supple , negative JVD , negative carotid bruits , no thyromegaly    Chest: fine R base crackles    Cardiovascular: regular rate and rhythm , neg murmurs / rubs / gallops    Abdomen: soft , non distended , no tenderness to palpation in all 4 quadrants ,  normal bowel sounds     Extremities:3+ edema ,  calf tenderness to palpation      Neurologic Exam:     Alert and oriented      Motor Exam:        > 4/5 x 4 extremities     Sensation:         intact to light touch x 4 extremities     DTR:           biceps/brachioradialis: equal                            patella/ankle: equal      Gait:  not tested         PM&R Impression: admitted for progressive LE swelling    -  found to be hypoxic secondarily to volume overload and anemic         Recommendations / Plan:       1) Physical / Occupational therapy focusing on therapeutic exercises , equipment evaluation , bed mobility/transfer out of bed evaluation , progressive ambulation with assistive devices prn .    2) Current disposition plan recommendation:    pending functional progress

## 2024-05-16 NOTE — DISCHARGE NOTE PROVIDER - NSDCFUSCHEDAPPT_GEN_ALL_CORE_FT
Makeda Das Physician [facility]  [facility] [address]  Scheduled Appointment: 05/24/2024

## 2024-05-16 NOTE — PHYSICAL THERAPY INITIAL EVALUATION ADULT - MANUAL MUSCLE TESTING RESULTS, REHAB EVAL
B UE at least 3/5 throughout assessed through functional mobility; B LE grossly 4/5 for all major pivots

## 2024-05-16 NOTE — DISCHARGE NOTE NURSING/CASE MANAGEMENT/SOCIAL WORK - NSDCFUADDAPPT_GEN_ALL_CORE_FT
Please follow-up with vascular surgery outpatient to establish long-term care to address your chronic swelling of lower legs. You may call Dr. Duncan Blanco's office (contact info listed in discharge paperwork) to set up a new patient appointment when one is available.     If interested in physical therapy for chronic swelling, you may try going to [facility]. You can call [phone] to discuss setting up an appointment.

## 2024-05-16 NOTE — PROGRESS NOTE ADULT - PROBLEM SELECTOR PLAN 1
Patient with > 10 loose stools in past 24 hours, non-bloody. Remains afebrile and hemodynamically stable.   - F/u GI PCR, C. diff  - Monitor electrolytes as patient has been hypokalemic to 2.8, replete as needed

## 2024-05-16 NOTE — DISCHARGE NOTE PROVIDER - CARE PROVIDERS DIRECT ADDRESSES
-- DO NOT REPLY / DO NOT REPLY ALL --  -- Message is from the [facility]--    General Patient Message      Reason for Call: Patient is returning call from the office regarding her FMLA forms. Please call back.     Caller Information       Type Contact Phone/Fax    06/21/2022 04:12 PM CDT Phone (Incoming) Francesca Craig (Self) [phone] (H)          Alternative phone number: [phone] ('s phone number)        Did the caller agree that this message can wait until the office reopens in the morning? YES - The Message Can Wait      Send a message to the provider’s clinical support pool.     Turnaround time given to caller:   \"This message will be sent to [state Provider's name]. The clinical team will fulfill your request as soon as they review your message when the office opens tomorrow.\"         ,DirectAddress_Unknown ,DirectAddress_Unknown,DirectAddress_Unknown ,DirectAddress_Unknown,DirectAddress_Unknown,[email or web address]

## 2024-05-17 LAB — LEVETIRACETAM SERPL-MCNC: <2 UG/ML — LOW (ref 10–40)

## 2024-05-18 ENCOUNTER — EMERGENCY (EMERGENCY)
Facility: HOSPITAL | Age: 59
LOS: 1 days | Discharge: HOME | End: 2024-05-18
Attending: EMERGENCY MEDICINE | Admitting: EMERGENCY MEDICINE
Payer: COMMERCIAL

## 2024-05-18 VITALS
RESPIRATION RATE: 16 BRPM | DIASTOLIC BLOOD PRESSURE: 86 MMHG | OXYGEN SATURATION: 95 % | HEART RATE: 100 BPM | TEMPERATURE: 98 F | SYSTOLIC BLOOD PRESSURE: 152 MMHG

## 2024-05-18 VITALS
WEIGHT: 125 LBS | RESPIRATION RATE: 16 BRPM | HEIGHT: 60.4 IN | TEMPERATURE: 98 F | HEART RATE: 90 BPM | SYSTOLIC BLOOD PRESSURE: 108 MMHG | OXYGEN SATURATION: 94 % | DIASTOLIC BLOOD PRESSURE: 72 MMHG

## 2024-05-18 DIAGNOSIS — R79.89 OTHER SPECIFIED ABNORMAL FINDINGS OF BLOOD CHEMISTRY: ICD-10-CM

## 2024-05-18 DIAGNOSIS — R60.9 EDEMA, UNSPECIFIED: ICD-10-CM

## 2024-05-18 DIAGNOSIS — I10 ESSENTIAL (PRIMARY) HYPERTENSION: ICD-10-CM

## 2024-05-18 DIAGNOSIS — R56.9 UNSPECIFIED CONVULSIONS: ICD-10-CM

## 2024-05-18 DIAGNOSIS — D64.9 ANEMIA, UNSPECIFIED: ICD-10-CM

## 2024-05-18 LAB
ANION GAP SERPL CALC-SCNC: 16 MMOL/L — SIGNIFICANT CHANGE UP (ref 5–17)
BASOPHILS # BLD AUTO: 0.07 K/UL — SIGNIFICANT CHANGE UP (ref 0–0.2)
BASOPHILS NFR BLD AUTO: 0.8 % — SIGNIFICANT CHANGE UP (ref 0–2)
BUN SERPL-MCNC: 12 MG/DL — SIGNIFICANT CHANGE UP (ref 7–23)
CALCIUM SERPL-MCNC: 9.4 MG/DL — SIGNIFICANT CHANGE UP (ref 8.4–10.5)
CHLORIDE SERPL-SCNC: 106 MMOL/L — SIGNIFICANT CHANGE UP (ref 96–108)
CO2 SERPL-SCNC: 21 MMOL/L — LOW (ref 22–31)
CREAT SERPL-MCNC: 0.41 MG/DL — LOW (ref 0.5–1.3)
EGFR: 114 ML/MIN/1.73M2 — SIGNIFICANT CHANGE UP
EOSINOPHIL # BLD AUTO: 0.13 K/UL — SIGNIFICANT CHANGE UP (ref 0–0.5)
EOSINOPHIL NFR BLD AUTO: 1.4 % — SIGNIFICANT CHANGE UP (ref 0–6)
GLUCOSE SERPL-MCNC: 90 MG/DL — SIGNIFICANT CHANGE UP (ref 70–99)
HCT VFR BLD CALC: 27.2 % — LOW (ref 34.5–45)
HGB BLD-MCNC: 8.4 G/DL — LOW (ref 11.5–15.5)
IMM GRANULOCYTES NFR BLD AUTO: 0.4 % — SIGNIFICANT CHANGE UP (ref 0–0.9)
LYMPHOCYTES # BLD AUTO: 2.02 K/UL — SIGNIFICANT CHANGE UP (ref 1–3.3)
LYMPHOCYTES # BLD AUTO: 21.7 % — SIGNIFICANT CHANGE UP (ref 13–44)
MCHC RBC-ENTMCNC: 30.9 GM/DL — LOW (ref 32–36)
MCHC RBC-ENTMCNC: 31.3 PG — SIGNIFICANT CHANGE UP (ref 27–34)
MCV RBC AUTO: 101.5 FL — HIGH (ref 80–100)
MONOCYTES # BLD AUTO: 1.19 K/UL — HIGH (ref 0–0.9)
MONOCYTES NFR BLD AUTO: 12.8 % — SIGNIFICANT CHANGE UP (ref 2–14)
NEUTROPHILS # BLD AUTO: 5.85 K/UL — SIGNIFICANT CHANGE UP (ref 1.8–7.4)
NEUTROPHILS NFR BLD AUTO: 62.9 % — SIGNIFICANT CHANGE UP (ref 43–77)
NRBC # BLD: 0 /100 WBCS — SIGNIFICANT CHANGE UP (ref 0–0)
NT-PROBNP SERPL-SCNC: 702 PG/ML — HIGH (ref 0–300)
PLATELET # BLD AUTO: 368 K/UL — SIGNIFICANT CHANGE UP (ref 150–400)
POTASSIUM SERPL-MCNC: 4.2 MMOL/L — SIGNIFICANT CHANGE UP (ref 3.5–5.3)
POTASSIUM SERPL-SCNC: 4.2 MMOL/L — SIGNIFICANT CHANGE UP (ref 3.5–5.3)
RBC # BLD: 2.68 M/UL — LOW (ref 3.8–5.2)
RBC # FLD: 18.8 % — HIGH (ref 10.3–14.5)
SODIUM SERPL-SCNC: 143 MMOL/L — SIGNIFICANT CHANGE UP (ref 135–145)
WBC # BLD: 9.3 K/UL — SIGNIFICANT CHANGE UP (ref 3.8–10.5)
WBC # FLD AUTO: 9.3 K/UL — SIGNIFICANT CHANGE UP (ref 3.8–10.5)

## 2024-05-18 PROCEDURE — 99283 EMERGENCY DEPT VISIT LOW MDM: CPT

## 2024-05-18 PROCEDURE — 80048 BASIC METABOLIC PNL TOTAL CA: CPT

## 2024-05-18 PROCEDURE — 83880 ASSAY OF NATRIURETIC PEPTIDE: CPT

## 2024-05-18 PROCEDURE — 99284 EMERGENCY DEPT VISIT MOD MDM: CPT

## 2024-05-18 PROCEDURE — 36415 COLL VENOUS BLD VENIPUNCTURE: CPT

## 2024-05-18 PROCEDURE — 85025 COMPLETE CBC W/AUTO DIFF WBC: CPT

## 2024-05-18 NOTE — ED ADULT NURSE NOTE - NSFALLUNIVINTERV_ED_ALL_ED
Bed/Stretcher in lowest position, wheels locked, appropriate side rails in place/Call bell, personal items and telephone in reach/Instruct patient to call for assistance before getting out of bed/chair/stretcher/Non-slip footwear applied when patient is off stretcher/[place] to call system/Physically safe environment - no spills, clutter or unnecessary equipment/Purposeful proactive rounding/Room/bathroom lighting operational, light cord in reach

## 2024-05-18 NOTE — ED ADULT TRIAGE NOTE - CHIEF COMPLAINT QUOTE
Pt presents with bilateral lower extremity pain/redness/swelling x at least 1 week. Pt denies any other symptoms. Pt states that she was supposed to be on PO abx but is unsure of her compliance. Pt is a poor historian. Denies recent fevers or chills. Denies dizziness or weakness. No new acute changes within the last 24 hours.

## 2024-05-18 NOTE — ED ADULT NURSE NOTE - OBJECTIVE STATEMENT
pt bibems aox4 c/o lle pain/swelling. pt was seen here this week for the same complaint, was given antibiotics, but pt state she still has pain. pulses intact. pt able to ambulate.

## 2024-05-19 NOTE — ED PROVIDER NOTE - OBJECTIVE STATEMENT
58 yr old female, history of htn, alcohol use disorder, seizure, presents to the Emergency Department w leg swelling. pt admitted 5/14-5/16 for worsening of chronic LE edema - doppler negative for DVT, low concern for HF given TTE largely normal. thought to be related to lymphedema vs venous stasis - recommended to f/u w vascular. was found to be anemic (s/p 1un prbcs) and hypokalemic (i/s/o diarrhea). pt now here w continued leg swelling. states since discharge her symptoms have continued, have not worsened but staying consistent. thinks she is supposed to be on oral abx. no fever, chills, cp, sob, extremity weakness / numbness, n/v. no falls or injuries.

## 2024-05-19 NOTE — ED PROVIDER NOTE - PROGRESS NOTE DETAILS
labs reassuring - baseline anemia. no leukocytosis. creatinine ok. BNP slightly elevated at 700 - improved from last admission.   as on admission suspect symptoms chronic venous stasis.   recommend outpt follow up with vascular.     All results reviewed with the patient verbally. Discharge plan and return precautions d/w pt who verbalized understanding and agrees with plan. All questions answered. Vitals WNL. Ready for d/c.

## 2024-05-19 NOTE — ED PROVIDER NOTE - PHYSICAL EXAMINATION
Constitutional : non-toxic, no acute distress. awake, alert, oriented to person, place, time/situation.  Head : head normocephalic, atraumatic  EENMT : eyes clear bilaterally, PERRL, EOMI. airway patent. moist mucous membranes. neck supple.  Cardiac : Normal rate, regular rhythm. No murmur appreciated, 2+ bilateral LE edema and symmetric..  Resp : Breath sounds clear and equal bilaterally. Respirations even and unlabored.   Gastro : abdomen soft, nontender, nondistended. no rebound or guarding.   MSK :  range of motion is not limited, no muscle or joint tenderness  Back : No evidence of trauma.   Vasc : Extremities warm and well perfused. 2+ radial and DP pulses. cap refill <2 seconds  Neuro : Alert and oriented, CNII-XII grossly intact, no motor or sensory deficits.  Skin : Skin normal color for race, warm, dry and intact. No evidence of rash.  Psych : Alert and oriented to person, place, time/situation. normal mood and affect. no apparent risk to self or others.

## 2024-05-19 NOTE — ED PROVIDER NOTE - NSFOLLOWUPINSTRUCTIONS_ED_ALL_ED_FT
Continue antibiotics as prescribed.    LEG SWELLING -     Keep your legs elevated as often as possible.   Use compressive stockings to keep the swelling down.   Avoid long periods of standing.   Try to restrict salt intake in your diet.     Follow up with your primary medical doctor within 1-2 weeks for further evaluation if the symptoms persist.     Return to the Emergency Department for persistent, worsening, or new symptoms including increased pain/redness/swelling/warmth of your legs, fever/chills, chest pain, shortness of breath, or any other serious concerns.

## 2024-05-19 NOTE — ED PROVIDER NOTE - CLINICAL SUMMARY MEDICAL DECISION MAKING FREE TEXT BOX
history of htn, alcohol use disorder, seizure, admitted 5/14-5/16 for worsening of chronic LE edema - doppler negative for DVT, low concern for HF given TTE largely normal. thought to be related to lymphedema vs venous stasis - recommended to f/u w vascular. was found to be anemic (s/p 1un prbcs) and hypokalemic (i/s/o diarrhea). here w continued - not worsening - leg swelling since discharge. thinks she is supposed to be on abx.   pt nontoxic appearing, resting comfortably, stable vitals, bilateral LE edema 2+, symmetric. no discoloration. neurovascularly intact. lungs clear.   suspect ongoing of pts chronic leg swelling - thought to be venous stasis on last admission.   lungs clear - do not think pt clinically fluid overloaded and EF on admission was ok.   will check basic labs to assess for anemia and electrolytes given abnormalities on recent admission.   no concern for dvt. chart reviewed and recent dopplers negative  clinically no concern for cellulitis. pt thinks she is supposed to be on abx but on chart review there were no abx at discharge and no documentation that suggests concern for cellulitis

## 2024-05-19 NOTE — ED PROVIDER NOTE - IV ALTEPLASE EXCL REL HIDDEN
[facility] TMS Program  [address], [address]  [place] [address]  TMS Procedure Note   Mariaelena Jean Baptiste MRN# [id number]  Age: 48 year old year old YOB: 1970    Pre-Procedure:  History and Physical: Reviewed in medical record  Consent Signed by: Mariaelena Jean Baptiste  On: 11/22/2019    Clinical Narrative:  Patient tolerating treatment. Patient reports seeing some change in improvement.     Indications for TMS:  MDD, recurrent, severe; 4+ medication trials (from 2+ classes) ineffective; Psychotherapy ineffective.     Pre-Procedure Diagnosis:  MDD, recurrent, severe F33.2    Treatment Hx:  Treatment number this series: 9  Total lifetime treatment number: 39    No Known Allergies   BP (!) 155/102 (BP Location: Left arm, Patient Position: Sitting, Cuff Size: Adult Regular)   Pulse 92       Pause for the Cause  Right patient:  Yes  Right procedure/correct coil:  Yes; rTMS; cpt 37375; H1 coil.   Earplugs in place:  Yes    Procedure  Patient was seated in procedure chair. Identity and procedure was verified. Ear plugs were placed in ears and patient-specific cap was placed on head and tightened appropriately. Ruler locations were verified. Coil was placed at treatment location and stimulator was set to parameters described below. A test train was delivered and pt tolerated train. Given pt tolerance, 55 treatment trains were delivered. Pt tolerated procedure well with minor jaw movement.    Motor Threshold Determination  Distance from nasion to inion: 36.0  MT 1: 0 - 6.5 - 16 @ 47% on 11/22/2019    Stimulation Parameters  Frequency: 18 Hz     Train duration: 2 sec  Total pulses delivered: 1980  Inter-train interval: 20 sec  Tx Loc: 0 - eyebrows  - 15  Energy: 56% (120% MT)  Trains: 55 trains      Rating Scales:  Date MADRS IDS-SR PHQ-9   11/22/19 25 43 18   11/29/19  45 18                     Post-Procedure Diagnosis:  MDD, recurrent, severe 296.33    [facility]  Health Neuromodulation    Plan   - Cont TMS      I did not see the patient during/after treatment but remained available in the clinic during  treatment.    Moni Mcgee MD  [facility] Neuromodulation     show

## 2024-05-19 NOTE — ED PROVIDER NOTE - PATIENT PORTAL LINK FT
You can access the FollowMyHealth Patient Portal offered by [facility] by registering at the following website: [email or web address]. By joining [employer]’s FollowMyHealth portal, you will also be able to view your health information using other applications (apps) compatible with our system.

## 2024-05-19 NOTE — ED PROVIDER NOTE - ATTENDING APP SHARED VISIT CONTRIBUTION OF CARE
Agree with JORDY management and care neurovascularly intact is continuing her antibiotics advised to continue taking her diuretics follow-up closely with vascular clinic has good follow-up stable for DC with return precautions

## 2024-05-23 DIAGNOSIS — G40.909 EPILEPSY, UNSPECIFIED, NOT INTRACTABLE, WITHOUT STATUS EPILEPTICUS: ICD-10-CM

## 2024-05-23 DIAGNOSIS — D53.9 NUTRITIONAL ANEMIA, UNSPECIFIED: ICD-10-CM

## 2024-05-23 DIAGNOSIS — R60.0 LOCALIZED EDEMA: ICD-10-CM

## 2024-05-23 DIAGNOSIS — E87.6 HYPOKALEMIA: ICD-10-CM

## 2024-05-23 DIAGNOSIS — J96.01 ACUTE RESPIRATORY FAILURE WITH HYPOXIA: ICD-10-CM

## 2024-05-23 DIAGNOSIS — T50.1X5A ADVERSE EFFECT OF LOOP [HIGH-CEILING] DIURETICS, INITIAL ENCOUNTER: ICD-10-CM

## 2024-05-23 DIAGNOSIS — Z86.73 PERSONAL HISTORY OF TRANSIENT ISCHEMIC ATTACK (TIA), AND CEREBRAL INFARCTION WITHOUT RESIDUAL DEFICITS: ICD-10-CM

## 2024-05-23 DIAGNOSIS — I87.8 OTHER SPECIFIED DISORDERS OF VEINS: ICD-10-CM

## 2024-05-23 DIAGNOSIS — I10 ESSENTIAL (PRIMARY) HYPERTENSION: ICD-10-CM

## 2024-05-23 DIAGNOSIS — E83.42 HYPOMAGNESEMIA: ICD-10-CM

## 2024-05-23 DIAGNOSIS — T50.916A UNDERDOSING OF MULTIPLE UNSPECIFIED DRUGS, MEDICAMENTS AND BIOLOGICAL SUBSTANCES, INITIAL ENCOUNTER: ICD-10-CM

## 2024-05-23 DIAGNOSIS — G62.1 ALCOHOLIC POLYNEUROPATHY: ICD-10-CM

## 2024-05-23 DIAGNOSIS — F17.210 NICOTINE DEPENDENCE, CIGARETTES, UNCOMPLICATED: ICD-10-CM

## 2024-05-23 DIAGNOSIS — Z91.148 PATIENT'S OTHER NONCOMPLIANCE WITH MEDICATION REGIMEN FOR OTHER REASON: ICD-10-CM

## 2024-05-23 DIAGNOSIS — I87.2 VENOUS INSUFFICIENCY (CHRONIC) (PERIPHERAL): ICD-10-CM

## 2024-05-23 DIAGNOSIS — F10.10 ALCOHOL ABUSE, UNCOMPLICATED: ICD-10-CM
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
## 2024-05-23 DIAGNOSIS — R19.7 DIARRHEA, UNSPECIFIED: ICD-10-CM

## 2024-05-23 DIAGNOSIS — L29.9 PRURITUS, UNSPECIFIED: ICD-10-CM

## 2024-05-23 DIAGNOSIS — J81.1 CHRONIC PULMONARY EDEMA: ICD-10-CM

## 2024-05-23 DIAGNOSIS — I89.0 LYMPHEDEMA, NOT ELSEWHERE CLASSIFIED: ICD-10-CM

## 2024-06-19 PROBLEM — F10.10 ALCOHOL ABUSE: Status: ACTIVE | Noted: 2022-08-12

## 2024-06-19 PROBLEM — R56.9 SEIZURE: Status: ACTIVE | Noted: 2022-08-12

## 2024-06-19 NOTE — CARDIOLOGY SUMMARY
[de-identified] : 6/21/24:  [de-identified] : 5/15/24; Normal biventricular systolic function, moderately dilated LA, no effusion, PASP 38 mmHg

## 2024-06-19 NOTE — HISTORY OF PRESENT ILLNESS
[FreeTextEntry1] : 58-year-old woman with history of ETOH abuse is here to establish care.    6/21/24:

## 2024-06-21 ENCOUNTER — APPOINTMENT (OUTPATIENT)
Dept: HEART AND VASCULAR | Facility: CLINIC | Age: 59
End: 2024-06-21

## 2024-06-21 DIAGNOSIS — R56.9 UNSPECIFIED CONVULSIONS: ICD-10-CM

## 2024-06-21 DIAGNOSIS — F10.10 ALCOHOL ABUSE, UNCOMPLICATED: ICD-10-CM

## 2024-08-13 ENCOUNTER — APPOINTMENT (OUTPATIENT)
Dept: INTERNAL MEDICINE | Facility: CLINIC | Age: 59
End: 2024-08-13

## 2024-08-13 ENCOUNTER — LABORATORY RESULT (OUTPATIENT)
Age: 59
End: 2024-08-13

## 2024-08-13 VITALS
OXYGEN SATURATION: 98 % | DIASTOLIC BLOOD PRESSURE: 74 MMHG | TEMPERATURE: 98.1 F | WEIGHT: 110 LBS | HEART RATE: 69 BPM | BODY MASS INDEX: 19.49 KG/M2 | HEIGHT: 63 IN | SYSTOLIC BLOOD PRESSURE: 133 MMHG

## 2024-08-13 DIAGNOSIS — R29.898 OTHER SYMPTOMS AND SIGNS INVOLVING THE MUSCULOSKELETAL SYSTEM: ICD-10-CM

## 2024-08-13 DIAGNOSIS — H61.21 IMPACTED CERUMEN, RIGHT EAR: ICD-10-CM

## 2024-08-13 DIAGNOSIS — G45.9 TRANSIENT CEREBRAL ISCHEMIC ATTACK, UNSPECIFIED: ICD-10-CM

## 2024-08-13 DIAGNOSIS — F41.0 PANIC DISORDER [EPISODIC PAROXYSMAL ANXIETY]: ICD-10-CM

## 2024-08-13 DIAGNOSIS — G47.00 INSOMNIA, UNSPECIFIED: ICD-10-CM

## 2024-08-13 DIAGNOSIS — I10 ESSENTIAL (PRIMARY) HYPERTENSION: ICD-10-CM

## 2024-08-13 DIAGNOSIS — F17.200 NICOTINE DEPENDENCE, UNSPECIFIED, UNCOMPLICATED: ICD-10-CM

## 2024-08-13 DIAGNOSIS — K52.9 NONINFECTIVE GASTROENTERITIS AND COLITIS, UNSPECIFIED: ICD-10-CM

## 2024-08-13 DIAGNOSIS — Z00.00 ENCOUNTER FOR GENERAL ADULT MEDICAL EXAMINATION W/OUT ABNORMAL FINDINGS: ICD-10-CM

## 2024-08-13 PROCEDURE — 36415 COLL VENOUS BLD VENIPUNCTURE: CPT

## 2024-08-13 PROCEDURE — 99396 PREV VISIT EST AGE 40-64: CPT

## 2024-08-13 RX ORDER — NICOTINE 21 MG/24HR
21 PATCH, TRANSDERMAL 24 HOURS TRANSDERMAL DAILY
Qty: 1 | Refills: 0 | Status: ACTIVE | COMMUNITY
Start: 2024-08-13 | End: 1900-01-01

## 2024-08-13 RX ORDER — AMLODIPINE BESYLATE 2.5 MG/1
2.5 TABLET ORAL DAILY
Qty: 90 | Refills: 3 | Status: ACTIVE | COMMUNITY
Start: 2024-08-13 | End: 1900-01-01

## 2024-08-15 DIAGNOSIS — D53.9 NUTRITIONAL ANEMIA, UNSPECIFIED: ICD-10-CM

## 2024-08-15 NOTE — HISTORY OF PRESENT ILLNESS
[FreeTextEntry1] : annual exam/est care [de-identified] : panic attacks - never seen psych/psychologist  HTN - was placed on medication in the past but did not continue - missed appointment w/ cardio last month  imbalance and b/l hand weakness - bumps into furniture and drops things she is holding.   seizure d/o - started after TIA during pandemic 2772-9559 - complicated by delusions/hallucinations - follows w/ neuro; was told to decrease keppra from 250mg qam and 500mg qpm, but has not made change yet.  h/o etoh abuse - recommended detox program during hospitalization for etoh induced seizure 9/7/2023 - continues to drink, but has recently switched to "low alcohol" wine 7% alochol about 1-1.5 bottles/night  HCM - Colonoscopy: needs referral - Ophthalmology: never - Dentist: UTD - GYN: needs referral - Derm: never - vax: completed covid x 2; never gets flu vax.

## 2024-08-15 NOTE — HEALTH RISK ASSESSMENT
[0] : 2) Feeling down, depressed, or hopeless: Not at all (0) [PHQ-2 Negative - No further assessment needed] : PHQ-2 Negative - No further assessment needed [Current] : Current [[id number]] : 0

## 2024-08-15 NOTE — HISTORY OF PRESENT ILLNESS
[FreeTextEntry1] : annual exam/est care [de-identified] : panic attacks - never seen psych/psychologist  HTN - was placed on medication in the past but did not continue - missed appointment w/ cardio last month  imbalance and b/l hand weakness - bumps into furniture and drops things she is holding.   seizure d/o - started after TIA during pandemic [id number] - complicated by delusions/hallucinations - follows w/ neuro; was told to decrease keppra from 250mg qam and 500mg qpm, but has not made change yet.  h/o etoh abuse - recommended detox program during hospitalization for etoh induced seizure 9/7/2023 - continues to drink, but has recently switched to "low alcohol" wine 7% alochol about 1-1.5 bottles/night  HCM - Colonoscopy: needs referral - Ophthalmology: never - Dentist: UTD - GYN: needs referral - Derm: never - vax: completed covid x 2; never gets flu vax.

## 2024-08-15 NOTE — HEALTH RISK ASSESSMENT
[0] : 2) Feeling down, depressed, or hopeless: Not at all (0) [PHQ-2 Negative - No further assessment needed] : PHQ-2 Negative - No further assessment needed [Current] : Current [[id number]] : 0

## 2024-08-16 DIAGNOSIS — E53.8 DEFICIENCY OF OTHER SPECIFIED B GROUP VITAMINS: ICD-10-CM

## 2024-08-16 DIAGNOSIS — E78.5 HYPERLIPIDEMIA, UNSPECIFIED: ICD-10-CM

## 2024-08-16 DIAGNOSIS — D50.9 IRON DEFICIENCY ANEMIA, UNSPECIFIED: ICD-10-CM

## 2024-08-16 LAB
ALBUMIN SERPL ELPH-MCNC: 4.4 G/DL
ALP BLD-CCNC: 84 U/L
ALT SERPL-CCNC: 19 U/L
ANION GAP SERPL CALC-SCNC: 13 MMOL/L
APPEARANCE: ABNORMAL
AST SERPL-CCNC: 26 U/L
BACTERIA: ABNORMAL /HPF
BASOPHILS # BLD AUTO: 0.09 K/UL
BASOPHILS NFR BLD AUTO: 1.4 %
BILIRUB SERPL-MCNC: <0.2 MG/DL
BILIRUBIN URINE: NEGATIVE
BLOOD URINE: NEGATIVE
BUN SERPL-MCNC: 6 MG/DL
CALCIUM SERPL-MCNC: 9.7 MG/DL
CAST: 2 /LPF
CHLORIDE SERPL-SCNC: 103 MMOL/L
CHOLEST SERPL-MCNC: 270 MG/DL
CO2 SERPL-SCNC: 23 MMOL/L
COLOR: NORMAL
CREAT SERPL-MCNC: 0.42 MG/DL
EGFR: 113 ML/MIN/1.73M2
EOSINOPHIL # BLD AUTO: 0.1 K/UL
EOSINOPHIL NFR BLD AUTO: 1.6 %
EPITHELIAL CELLS: 26 /HPF
ESTIMATED AVERAGE GLUCOSE: 108 MG/DL
FERRITIN SERPL-MCNC: 33 NG/ML
FOLATE SERPL-MCNC: 11.4 NG/ML
GLUCOSE QUALITATIVE U: NEGATIVE MG/DL
GLUCOSE SERPL-MCNC: 88 MG/DL
HBA1C MFR BLD HPLC: 5.4 %
HCT VFR BLD CALC: 37.3 %
HCV AB SER QL: NONREACTIVE
HCV S/CO RATIO: 0.09 S/CO
HDLC SERPL-MCNC: 81 MG/DL
HGB BLD-MCNC: 10.5 G/DL
IMM GRANULOCYTES NFR BLD AUTO: 0.5 %
IRON SATN MFR SERPL: 7 %
IRON SERPL-MCNC: 34 UG/DL
KETONES URINE: NEGATIVE MG/DL
LDLC SERPL CALC-MCNC: 168 MG/DL
LEUKOCYTE ESTERASE URINE: ABNORMAL
LYMPHOCYTES # BLD AUTO: 1.72 K/UL
LYMPHOCYTES NFR BLD AUTO: 26.7 %
MAN DIFF?: NORMAL
MCHC RBC-ENTMCNC: 28.2 GM/DL
MCHC RBC-ENTMCNC: 28.8 PG
MCV RBC AUTO: 102.5 FL
MICROSCOPIC-UA: NORMAL
MONOCYTES # BLD AUTO: 0.66 K/UL
MONOCYTES NFR BLD AUTO: 10.3 %
NEUTROPHILS # BLD AUTO: 3.83 K/UL
NEUTROPHILS NFR BLD AUTO: 59.5 %
NITRITE URINE: NEGATIVE
NONHDLC SERPL-MCNC: 189 MG/DL
PH URINE: 6
PLATELET # BLD AUTO: 451 K/UL
POTASSIUM SERPL-SCNC: 4.5 MMOL/L
PROT SERPL-MCNC: 7 G/DL
PROTEIN URINE: NEGATIVE MG/DL
RBC # BLD: 3.64 M/UL
RBC # BLD: 3.66 M/UL
RBC # FLD: 23 %
RED BLOOD CELLS URINE: 2 /HPF
RETICS # AUTO: 1.9 %
RETICS AGGREG/RBC NFR: 68.8 K/UL
REVIEW: NORMAL
SODIUM SERPL-SCNC: 140 MMOL/L
SPECIFIC GRAVITY URINE: 1.02
TIBC SERPL-MCNC: 456 UG/DL
TRIGL SERPL-MCNC: 124 MG/DL
TSH SERPL-ACNC: 0.52 UIU/ML
UIBC SERPL-MCNC: 422 UG/DL
UROBILINOGEN URINE: 0.2 MG/DL
VIT B12 SERPL-MCNC: 298 PG/ML
WBC # FLD AUTO: 6.43 K/UL
WHITE BLOOD CELLS URINE: 4 /HPF

## 2024-08-16 RX ORDER — CHLORHEXIDINE GLUCONATE 4 %
1000 LIQUID (ML) TOPICAL
Qty: 90 | Refills: 3 | Status: ACTIVE | COMMUNITY
Start: 2024-08-16

## 2024-08-16 RX ORDER — FERROUS SULFATE TAB EC 325 MG (65 MG FE EQUIVALENT) 325 (65 FE) MG
325 (65 FE) TABLET DELAYED RESPONSE ORAL DAILY
Qty: 90 | Refills: 3 | Status: ACTIVE | COMMUNITY
Start: 2024-08-16 | End: 1900-01-01

## 2024-08-16 RX ORDER — ATORVASTATIN CALCIUM 40 MG/1
40 TABLET, FILM COATED ORAL
Qty: 90 | Refills: 3 | Status: ACTIVE | COMMUNITY
Start: 2024-08-16 | End: 1900-01-01

## 2024-08-26 ENCOUNTER — TRANSCRIPTION ENCOUNTER (OUTPATIENT)
Age: 59
End: 2024-08-26

## 2024-09-04 ENCOUNTER — RESULT REVIEW (OUTPATIENT)
Age: 59
End: 2024-09-04

## 2024-09-04 ENCOUNTER — APPOINTMENT (OUTPATIENT)
Dept: MAMMOGRAPHY | Facility: CLINIC | Age: 59
End: 2024-09-04
Payer: COMMERCIAL

## 2024-09-04 PROCEDURE — 76641 ULTRASOUND BREAST COMPLETE: CPT | Mod: 50

## 2024-09-04 PROCEDURE — 77067 SCR MAMMO BI INCL CAD: CPT

## 2024-09-04 PROCEDURE — 77063 BREAST TOMOSYNTHESIS BI: CPT

## 2024-09-12 ENCOUNTER — APPOINTMENT (OUTPATIENT)
Dept: OPHTHALMOLOGY | Facility: CLINIC | Age: 59
End: 2024-09-12
Payer: COMMERCIAL

## 2024-09-12 ENCOUNTER — NON-APPOINTMENT (OUTPATIENT)
Age: 59
End: 2024-09-12

## 2024-09-12 PROCEDURE — 92015 DETERMINE REFRACTIVE STATE: CPT

## 2024-09-12 PROCEDURE — 92012 INTRM OPH EXAM EST PATIENT: CPT

## 2024-09-12 PROCEDURE — 92020 GONIOSCOPY: CPT

## 2024-09-19 ENCOUNTER — APPOINTMENT (OUTPATIENT)
Dept: GASTROENTEROLOGY | Facility: CLINIC | Age: 59
End: 2024-09-19
Payer: COMMERCIAL

## 2024-09-19 VITALS
SYSTOLIC BLOOD PRESSURE: 148 MMHG | RESPIRATION RATE: 16 BRPM | WEIGHT: 116.2 LBS | HEIGHT: 63 IN | BODY MASS INDEX: 20.59 KG/M2 | TEMPERATURE: 97.7 F | DIASTOLIC BLOOD PRESSURE: 84 MMHG | OXYGEN SATURATION: 99 % | HEART RATE: 95 BPM

## 2024-09-19 DIAGNOSIS — Z12.11 ENCOUNTER FOR SCREENING FOR MALIGNANT NEOPLASM OF COLON: ICD-10-CM

## 2024-09-19 PROCEDURE — 99204 OFFICE O/P NEW MOD 45 MIN: CPT

## 2024-09-19 RX ORDER — SODIUM SULFATE, POTASSIUM SULFATE AND MAGNESIUM SULFATE 1.6; 3.13; 17.5 G/177ML; G/177ML; G/177ML
17.5-3.13-1.6 SOLUTION ORAL
Qty: 1 | Refills: 0 | Status: ACTIVE | COMMUNITY
Start: 2024-09-19 | End: 1900-01-01

## 2024-09-19 NOTE — PHYSICAL EXAM
[Alert] : alert [Well Developed] : well developed [Abdomen Tenderness] : non-tender [No Masses] : no abdominal mass palpated [Abdomen Soft] : soft

## 2024-09-20 NOTE — HISTORY OF PRESENT ILLNESS
[FreeTextEntry1] : 60 yo female w/ hx of HTN, TIA, seizure disorder (on keppra), EtOH use disorder who presents for colonoscopy consult:   Has 3-5 BMs per day, mostly soft or watery. BM pattern has not changed in the past 3 years. The BMs usually happen after eating or drinking and are usually associated w/ fecal urgency. No melena or hematochezia. No nocturnal diarrhea. No fever, chills, unintentional weight loss, night sweats. No dysphagia, odynophagia, early satiety. Very intermittent reflux symptoms. No changes to appetite. Has not tried stopping dairy or any other foods. Drinks 5-6 glasses of 6% wine nightly, used to drink the same amount but w/ 13% wine and also cut out hard liquor. No recreational drug use. No frequent NSAID use, ASA, or AC. No iron supplementation.   Colonoscopy done 8-9 years ago (no polyps removed but was told to repeat in colonoscopy). No prior EGD.  Family hx: no family history of gastric, colon cancer, or IBD

## 2024-09-20 NOTE — ASSESSMENT
[FreeTextEntry1] : 58 yo female w/ hx of HTN, TIA, seizure disorder (on keppra), EtOH use disorder who presents for colonoscopy consult:   #Colon cancer screening #Iron deficiency anemia #Diarrhea - Patient w/ prior colonoscopy done 8-9 years ago and was told to repeat in 5 years so is overdue for colon cancer screening. Also w/ iron deficiency anemia per recent labs and no prior EGD. Diarrhea possibly 2/2 EtOH induced diarrhea. No red flag symptoms c/w IBD.  - EGD/colonoscopy for ASHLEY evaluation and routine colon cancer screening. Suprep bowel prep - TTG to r/o celiac given diarrhea - Advised to reduce EtOH use - Advised patient to try dairy free diet     Black Edmond MD PGY-4 Gastroenterology Fellow

## 2024-09-20 NOTE — END OF VISIT
[] : Fellow [FreeTextEntry3] : 59F w/ HTN, HLD, prior Sz's (?ISO of ETOH use), AUD. Here for CRC screening. Last COL per pt ~9y ago, ?results, told to repeat in 5y. Labs reviewed, notable for HB ~10, ferr 30s. Also describes 3-5 loose stools during the day, no nocturnal Sx. Will check celiac sero and plan for EGD (w/ DD & gastric Bx) and COL (w/ random Bx) at [facility].

## 2024-09-20 NOTE — ASSESSMENT
[FreeTextEntry1] : 60 yo female w/ hx of HTN, TIA, seizure disorder (on keppra), EtOH use disorder who presents for colonoscopy consult:   #Colon cancer screening #Iron deficiency anemia #Diarrhea - Patient w/ prior colonoscopy done 8-9 years ago and was told to repeat in 5 years so is overdue for colon cancer screening. Also w/ iron deficiency anemia per recent labs and no prior EGD. Diarrhea possibly 2/2 EtOH induced diarrhea. No red flag symptoms c/w IBD.  - EGD/colonoscopy for ASHLEY evaluation and routine colon cancer screening. Suprep bowel prep - TTG to r/o celiac given diarrhea - Advised to reduce EtOH use - Advised patient to try dairy free diet     Black Edmond MD PGY-4 Gastroenterology Fellow

## 2024-09-20 NOTE — END OF VISIT
[] : Fellow [FreeTextEntry3] : 59F w/ HTN, HLD, prior Sz's (?ISO of ETOH use), AUD. Here for CRC screening. Last COL per pt ~9y ago, ?results, told to repeat in 5y. Labs reviewed, notable for HB ~10, ferr 30s. Also describes 3-5 loose stools during the day, no nocturnal Sx. Will check celiac sero and plan for EGD (w/ DD & gastric Bx) and COL (w/ random Bx) at [facility].

## 2024-09-27 ENCOUNTER — APPOINTMENT (OUTPATIENT)
Dept: OPHTHALMOLOGY | Facility: CLINIC | Age: 59
End: 2024-09-27
Payer: COMMERCIAL

## 2024-09-27 ENCOUNTER — NON-APPOINTMENT (OUTPATIENT)
Age: 59
End: 2024-09-27

## 2024-09-27 PROCEDURE — 92020 GONIOSCOPY: CPT

## 2024-09-27 PROCEDURE — 92012 INTRM OPH EXAM EST PATIENT: CPT

## 2024-09-27 PROCEDURE — 92133 CPTRZD OPH DX IMG PST SGM ON: CPT

## 2024-09-27 PROCEDURE — 76514 ECHO EXAM OF EYE THICKNESS: CPT

## 2024-09-27 PROCEDURE — 92132 CPTRZD OPH DX IMG ANT SGM: CPT

## 2024-09-30 ENCOUNTER — APPOINTMENT (OUTPATIENT)
Dept: DERMATOLOGY | Facility: CLINIC | Age: 59
End: 2024-09-30
Payer: COMMERCIAL

## 2024-09-30 DIAGNOSIS — D48.5 NEOPLASM OF UNCERTAIN BEHAVIOR OF SKIN: ICD-10-CM

## 2024-09-30 DIAGNOSIS — L68.9 HYPERTRICHOSIS, UNSPECIFIED: ICD-10-CM

## 2024-09-30 DIAGNOSIS — L85.9 EPIDERMAL THICKENING, UNSPECIFIED: ICD-10-CM

## 2024-09-30 DIAGNOSIS — Z12.83 ENCOUNTER FOR SCREENING FOR MALIGNANT NEOPLASM OF SKIN: ICD-10-CM

## 2024-09-30 DIAGNOSIS — L29.9 PRURITUS, UNSPECIFIED: ICD-10-CM

## 2024-09-30 DIAGNOSIS — D22.9 MELANOCYTIC NEVI, UNSPECIFIED: ICD-10-CM

## 2024-09-30 DIAGNOSIS — L85.3 XEROSIS CUTIS: ICD-10-CM

## 2024-09-30 PROCEDURE — 99204 OFFICE O/P NEW MOD 45 MIN: CPT

## 2024-09-30 RX ORDER — UREA 40 G/100G
40 CREAM TOPICAL
Qty: 1 | Refills: 4 | Status: ACTIVE | COMMUNITY
Start: 2024-09-30 | End: 1900-01-01

## 2024-09-30 NOTE — ASSESSMENT
[FreeTextEntry1] : #NUB sternal notch -Asx nevus- offered biopsy vs short term monitoring -Pt prefers to monitor- clinical and dermoscopic photos taken and will RTC in 3 months, sooner for any obvious change  #Pruritus likely 2/2 xerosis - Discussed gentle skin care practices, including avoiding hot water, using fragrance free products, and frequent liberal moisturization with cream or ointment based emollients.  - iron labs, b12/folate, tsh checked- c/w iron supplementation  - may consider more comprehensive workup if generalized pruritus persists despite gentle skin care and moisturization   #Hyperkeratosis of feet -Urea cream 40% bid prn  #Hypertrichosis  - Disc eflornithine vs LHR, pt to consider   # Multiple melanocytic nevi, all benign appearing on today's exam -Sun protection was discussed. The proper use of broad spectrum UVB/UVA sunscreen with SPF 30 or greater was reviewed and the need for re-application after swimming or sweating or 2-3 hours was emphasized. We discussed judicious use of clothing and avoidance of peak periods of sun exposure. I made the patient aware of need for year-round protection. ABCDEs of melanoma reviewed. -Self-skin check also reviewed -Counseled pt to monitor for changes   # Screening for skin cancer -ABCDEs of melanoma, sun safety, and self-skin check reviewed -Counseled pt to notify us of any changing or concerning lesions -RTC 12 months, sooner prn

## 2024-09-30 NOTE — HISTORY OF PRESENT ILLNESS
[FreeTextEntry1] : NP moles, itchy skin [de-identified] : NP Here for eval of moles- Requesting FBSE. No personal or family hx of skin cancer. Diffusely itchy skin- worst on lower legs and back, sometimes face Also with excess hair on chin she is asking about Also with calluses on feet

## 2024-09-30 NOTE — PHYSICAL EXAM
[FreeTextEntry3] :  AAOx3, NAD, well-appearing / pleasant Total body skin exam performed within normal limits with the exception of:   Overlying sternum is brown macule with darker hyperpigmentation around left periphery Fairly uniform and regular brown macules and papules on the trunk and extremities Hyperkeratotic patches feet

## 2024-10-01 ENCOUNTER — APPOINTMENT (OUTPATIENT)
Dept: NEUROLOGY | Facility: CLINIC | Age: 59
End: 2024-10-01
Payer: COMMERCIAL

## 2024-10-01 VITALS
WEIGHT: 115 LBS | BODY MASS INDEX: 20.37 KG/M2 | TEMPERATURE: 98.2 F | SYSTOLIC BLOOD PRESSURE: 164 MMHG | HEART RATE: 79 BPM | DIASTOLIC BLOOD PRESSURE: 93 MMHG | OXYGEN SATURATION: 98 %

## 2024-10-01 DIAGNOSIS — R41.3 OTHER AMNESIA: ICD-10-CM

## 2024-10-01 DIAGNOSIS — R56.9 UNSPECIFIED CONVULSIONS: ICD-10-CM

## 2024-10-01 PROCEDURE — 99214 OFFICE O/P EST MOD 30 MIN: CPT

## 2024-10-01 PROCEDURE — G2211 COMPLEX E/M VISIT ADD ON: CPT | Mod: NC

## 2024-10-01 NOTE — REVIEW OF SYSTEMS
[Feeling Tired] : feeling tired [Memory Lapses or Loss] : memory loss [Decr. Concentrating Ability] : decreased concentrating ability [Seizures] : convulsions

## 2024-10-01 NOTE — PHYSICAL EXAM
[Cranial Nerves Optic (II)] : visual acuity intact bilaterally,  visual fields full to confrontation, pupils equal round and reactive to light [Cranial Nerves Oculomotor (III)] : extraocular motion intact [Cranial Nerves Facial (VII)] : face symmetrical [Cranial Nerves Vestibulocochlear (VIII)] : hearing was intact bilaterally [Motor Handedness Right-Handed] : the patient is right hand dominant [FreeTextEntry4] : MOCA 25/30  mainly memory 2/5

## 2024-10-01 NOTE — HISTORY OF PRESENT ILLNESS
[FreeTextEntry1] : Follow up  57 RH female with alcohol abuse and seizures,  Ex  in [place] No sz  Keppra 500 mg QD  HPI    4/16/24  Doing well  No seizures Feels that she gets dizzy. No medical issues except for SOB   9/19/23 HPI: Recent MICU admission for alcohol induced seizure on 9/7. Fell in living room, witnessed by . CT head showed large frontal hematoma but no fracture. L olecranon fracture - ortho consulted and recommended ORIF but pt refused. vEEG remained normal, discharged on home Topiramate 25mg and Keppra 500mg BID.  No seizures since discharge. Pt hasn't scheduled follow up with ortho. Notes she has cut back on drinking, only 1 glass of wine with dinner. ETOH abuse x 10 years  Ex  in [place].   Drinks 2 btl a nights. 8-10 btls a week Was drinking Vodka in the past.    Sleeps poorly Mood: down Never wanted to do rehab   Hospital course  #Seizure   Pt with recent seizure + witness seizure in ED noted to have generalized tonic  clonic shaking w/ foaming at mouth. Lasted 1-2 minutes. Uncertain if history of  seizure disorder. Per , no seizure prior to last 15 months. History of  alcohol use disorder with daily alcohol use, but reports not drinking day of  seizure. Seizure possibly alcohol withdrawal related vs. baseline seizure  disorder. Awaiting Keppra level results, will report levels to pt as it is send  out lab. Per pharmacy, Keppra last picked up 03/2022. vEEG demonstrated no  seizure activity. Epilepsy followed with recs as below   -Keppra to 750mg QD  -Phenobarbital 32.4mg QHS   # Alcohol withdrawal  Pt with alcohol use disorder as per  who states they find bottles of  vodka in garbage that belongs to pt. Suspected last drink 7/25. Negative CT  head and no FND on exam. Blood alcohol <10. Pt afebrile, no leukocytosis.  Received 2mg ativan in ED. On exam in ED, pt mildly agitated and anxious in  bed, non combative but needing redirection. AAOx3. 7/28/22, patient was AAOx3,  non combative but needing redirection, not linear stories. Recieved thiamine IV  500mg. Started MV and folate. CIWA 0 at time of discharge. Folate/B12 WNL  -Thiamine 1 g PO QD  -Counseling provided to alcohol cessation   # Hypertension.  Hx of HTN. /95 on admission. Currently Normotensive. At home is on  clonidine PO 1 mg BID, hydralazine PO 25mg BID with poor adherence.  -C/w home medications   Patient was discharged to: home with home PT recs    AED Keppra 500 mg BID B1 vit

## 2024-10-01 NOTE — DISCUSSION/SUMMARY
[FreeTextEntry1] : 60 y/o woman with ETOH abuse and recurrent seizures due to ETOH w/d On keppra 500 mg QD

## 2024-10-03 ENCOUNTER — APPOINTMENT (OUTPATIENT)
Dept: INTERNAL MEDICINE | Facility: CLINIC | Age: 59
End: 2024-10-03

## 2024-10-11 ENCOUNTER — APPOINTMENT (OUTPATIENT)
Dept: HEART AND VASCULAR | Facility: CLINIC | Age: 59
End: 2024-10-11

## 2024-10-11 DIAGNOSIS — E78.5 HYPERLIPIDEMIA, UNSPECIFIED: ICD-10-CM

## 2024-10-11 DIAGNOSIS — F10.10 ALCOHOL ABUSE, UNCOMPLICATED: ICD-10-CM

## 2024-10-11 DIAGNOSIS — I10 ESSENTIAL (PRIMARY) HYPERTENSION: ICD-10-CM

## 2024-12-04 ENCOUNTER — APPOINTMENT (OUTPATIENT)
Dept: INTERNAL MEDICINE | Facility: CLINIC | Age: 59
End: 2024-12-04

## 2024-12-11 ENCOUNTER — APPOINTMENT (OUTPATIENT)
Dept: GASTROENTEROLOGY | Facility: HOSPITAL | Age: 59
End: 2024-12-11

## 2024-12-11 ENCOUNTER — OUTPATIENT (OUTPATIENT)
Dept: OUTPATIENT SERVICES | Facility: HOSPITAL | Age: 59
LOS: 1 days | Discharge: HOME | End: 2024-12-11
Payer: COMMERCIAL

## 2024-12-11 ENCOUNTER — RESULT REVIEW (OUTPATIENT)
Age: 59
End: 2024-12-11

## 2024-12-11 VITALS — HEIGHT: 62 IN | WEIGHT: 115.08 LBS

## 2024-12-11 PROCEDURE — 45381 COLONOSCOPY SUBMUCOUS NJX: CPT

## 2024-12-11 PROCEDURE — 45385 COLONOSCOPY W/LESION REMOVAL: CPT

## 2024-12-11 PROCEDURE — 88305 TISSUE EXAM BY PATHOLOGIST: CPT

## 2024-12-11 PROCEDURE — 45380 COLONOSCOPY AND BIOPSY: CPT | Mod: XS

## 2024-12-11 PROCEDURE — 45380 COLONOSCOPY AND BIOPSY: CPT | Mod: 59

## 2024-12-11 PROCEDURE — 43239 EGD BIOPSY SINGLE/MULTIPLE: CPT

## 2024-12-11 PROCEDURE — 88305 TISSUE EXAM BY PATHOLOGIST: CPT | Mod: 26

## 2024-12-11 NOTE — PRE-ANESTHESIA EVALUATION ADULT - HEIGHT IN INCHES
What Type Of Note Output Would You Prefer (Optional)?: Standard Output What Is The Reason For Today's Visit?: Full Body Skin Examination with No Concerns What Is The Reason For Today's Visit? (Being Monitored For X): surveillance against the recurrence of atypical nevi How Severe Are Your Spot(S)?: moderate 2

## 2024-12-17 ENCOUNTER — APPOINTMENT (OUTPATIENT)
Dept: INTERNAL MEDICINE | Facility: CLINIC | Age: 59
End: 2024-12-17

## 2024-12-17 ENCOUNTER — NON-APPOINTMENT (OUTPATIENT)
Age: 59
End: 2024-12-17

## 2024-12-20 ENCOUNTER — INPATIENT (INPATIENT)
Facility: HOSPITAL | Age: 59
LOS: 0 days | Discharge: LEFT BEFORE DISCHARGE | DRG: 101 | End: 2024-12-21
Attending: INTERNAL MEDICINE | Admitting: INTERNAL MEDICINE
Payer: COMMERCIAL

## 2024-12-20 VITALS
HEART RATE: 88 BPM | RESPIRATION RATE: 16 BRPM | TEMPERATURE: 98 F | WEIGHT: 130.07 LBS | OXYGEN SATURATION: 90 % | HEIGHT: 62 IN | DIASTOLIC BLOOD PRESSURE: 80 MMHG | SYSTOLIC BLOOD PRESSURE: 148 MMHG

## 2024-12-20 LAB
ALBUMIN SERPL ELPH-MCNC: 4.6 G/DL — SIGNIFICANT CHANGE UP (ref 3.3–5)
ALP SERPL-CCNC: 88 U/L — SIGNIFICANT CHANGE UP (ref 40–120)
ALT FLD-CCNC: 54 U/L — HIGH (ref 10–45)
ANION GAP SERPL CALC-SCNC: 11 MMOL/L — SIGNIFICANT CHANGE UP (ref 5–17)
APPEARANCE UR: CLEAR — SIGNIFICANT CHANGE UP
AST SERPL-CCNC: 87 U/L — HIGH (ref 10–40)
BASOPHILS # BLD AUTO: 0.03 K/UL — SIGNIFICANT CHANGE UP (ref 0–0.2)
BASOPHILS NFR BLD AUTO: 0.4 % — SIGNIFICANT CHANGE UP (ref 0–2)
BILIRUB SERPL-MCNC: 0.6 MG/DL — SIGNIFICANT CHANGE UP (ref 0.2–1.2)
BILIRUB UR-MCNC: NEGATIVE — SIGNIFICANT CHANGE UP
BUN SERPL-MCNC: 9 MG/DL — SIGNIFICANT CHANGE UP (ref 7–23)
CALCIUM SERPL-MCNC: 9.8 MG/DL — SIGNIFICANT CHANGE UP (ref 8.4–10.5)
CHLORIDE SERPL-SCNC: 96 MMOL/L — SIGNIFICANT CHANGE UP (ref 96–108)
CO2 SERPL-SCNC: 29 MMOL/L — SIGNIFICANT CHANGE UP (ref 22–31)
COLOR SPEC: YELLOW — SIGNIFICANT CHANGE UP
CREAT SERPL-MCNC: 0.33 MG/DL — LOW (ref 0.5–1.3)
DIFF PNL FLD: NEGATIVE — SIGNIFICANT CHANGE UP
EGFR: 119 ML/MIN/1.73M2 — SIGNIFICANT CHANGE UP
EOSINOPHIL # BLD AUTO: 0 K/UL — SIGNIFICANT CHANGE UP (ref 0–0.5)
EOSINOPHIL NFR BLD AUTO: 0 % — SIGNIFICANT CHANGE UP (ref 0–6)
ETHANOL SERPL-MCNC: <10 MG/DL — SIGNIFICANT CHANGE UP (ref 0–10)
GLUCOSE SERPL-MCNC: 186 MG/DL — HIGH (ref 70–99)
GLUCOSE UR QL: 100 MG/DL
HCT VFR BLD CALC: 37 % — SIGNIFICANT CHANGE UP (ref 34.5–45)
HGB BLD-MCNC: 12.8 G/DL — SIGNIFICANT CHANGE UP (ref 11.5–15.5)
IMM GRANULOCYTES NFR BLD AUTO: 0.5 % — SIGNIFICANT CHANGE UP (ref 0–0.9)
KETONES UR-MCNC: 15 MG/DL
LACTATE SERPL-SCNC: 1.2 MMOL/L — SIGNIFICANT CHANGE UP (ref 0.5–2)
LEUKOCYTE ESTERASE UR-ACNC: ABNORMAL
LYMPHOCYTES # BLD AUTO: 0.54 K/UL — LOW (ref 1–3.3)
LYMPHOCYTES # BLD AUTO: 6.3 % — LOW (ref 13–44)
MAGNESIUM SERPL-MCNC: 1.6 MG/DL — SIGNIFICANT CHANGE UP (ref 1.6–2.6)
MCHC RBC-ENTMCNC: 34.2 PG — HIGH (ref 27–34)
MCHC RBC-ENTMCNC: 34.6 G/DL — SIGNIFICANT CHANGE UP (ref 32–36)
MCV RBC AUTO: 98.9 FL — SIGNIFICANT CHANGE UP (ref 80–100)
MONOCYTES # BLD AUTO: 0.85 K/UL — SIGNIFICANT CHANGE UP (ref 0–0.9)
MONOCYTES NFR BLD AUTO: 10 % — SIGNIFICANT CHANGE UP (ref 2–14)
NEUTROPHILS # BLD AUTO: 7.08 K/UL — SIGNIFICANT CHANGE UP (ref 1.8–7.4)
NEUTROPHILS NFR BLD AUTO: 82.8 % — HIGH (ref 43–77)
NITRITE UR-MCNC: NEGATIVE — SIGNIFICANT CHANGE UP
NRBC # BLD: 0 /100 WBCS — SIGNIFICANT CHANGE UP (ref 0–0)
PH UR: 6.5 — SIGNIFICANT CHANGE UP (ref 5–8)
PLATELET # BLD AUTO: 215 K/UL — SIGNIFICANT CHANGE UP (ref 150–400)
POTASSIUM SERPL-MCNC: 3.8 MMOL/L — SIGNIFICANT CHANGE UP (ref 3.5–5.3)
POTASSIUM SERPL-SCNC: 3.8 MMOL/L — SIGNIFICANT CHANGE UP (ref 3.5–5.3)
PROT SERPL-MCNC: 7.3 G/DL — SIGNIFICANT CHANGE UP (ref 6–8.3)
PROT UR-MCNC: 30 MG/DL
RBC # BLD: 3.74 M/UL — LOW (ref 3.8–5.2)
RBC # FLD: 14.4 % — SIGNIFICANT CHANGE UP (ref 10.3–14.5)
SODIUM SERPL-SCNC: 136 MMOL/L — SIGNIFICANT CHANGE UP (ref 135–145)
SP GR SPEC: 1.02 — SIGNIFICANT CHANGE UP (ref 1–1.03)
TROPONIN T, HIGH SENSITIVITY RESULT: 10 NG/L — SIGNIFICANT CHANGE UP (ref 0–51)
UROBILINOGEN FLD QL: 1 MG/DL — SIGNIFICANT CHANGE UP (ref 0.2–1)
WBC # BLD: 8.54 K/UL — SIGNIFICANT CHANGE UP (ref 3.8–10.5)
WBC # FLD AUTO: 8.54 K/UL — SIGNIFICANT CHANGE UP (ref 3.8–10.5)

## 2024-12-20 PROCEDURE — 70450 CT HEAD/BRAIN W/O DYE: CPT | Mod: 26,MC

## 2024-12-20 PROCEDURE — 99223 1ST HOSP IP/OBS HIGH 75: CPT | Mod: GC

## 2024-12-20 PROCEDURE — 93010 ELECTROCARDIOGRAM REPORT: CPT

## 2024-12-20 PROCEDURE — 99285 EMERGENCY DEPT VISIT HI MDM: CPT

## 2024-12-20 PROCEDURE — 71045 X-RAY EXAM CHEST 1 VIEW: CPT | Mod: 26

## 2024-12-20 RX ORDER — LEVETIRACETAM 1000 MG/1
1000 TABLET ORAL ONCE
Refills: 0 | Status: COMPLETED | OUTPATIENT
Start: 2024-12-20 | End: 2024-12-20

## 2024-12-20 RX ORDER — MAGNESIUM, ALUMINUM HYDROXIDE 200-225/5
30 SUSPENSION, ORAL (FINAL DOSE FORM) ORAL EVERY 4 HOURS
Refills: 0 | Status: DISCONTINUED | OUTPATIENT
Start: 2024-12-20 | End: 2024-12-21

## 2024-12-20 RX ORDER — ONDANSETRON HYDROCHLORIDE 4 MG/1
4 TABLET, FILM COATED ORAL EVERY 8 HOURS
Refills: 0 | Status: DISCONTINUED | OUTPATIENT
Start: 2024-12-20 | End: 2024-12-21

## 2024-12-20 RX ORDER — ACETAMINOPHEN 500MG 500 MG/1
650 TABLET, COATED ORAL EVERY 6 HOURS
Refills: 0 | Status: DISCONTINUED | OUTPATIENT
Start: 2024-12-20 | End: 2024-12-21

## 2024-12-20 RX ORDER — ACETAMINOPHEN, DIPHENHYDRAMINE HCL, PHENYLEPHRINE HCL 325; 25; 5 MG/1; MG/1; MG/1
3 TABLET ORAL AT BEDTIME
Refills: 0 | Status: DISCONTINUED | OUTPATIENT
Start: 2024-12-20 | End: 2024-12-21

## 2024-12-20 RX ORDER — CEFTRIAXONE SODIUM 1 G
1000 VIAL (EA) INJECTION ONCE
Refills: 0 | Status: COMPLETED | OUTPATIENT
Start: 2024-12-20 | End: 2024-12-20

## 2024-12-20 RX ORDER — LEVETIRACETAM 1000 MG/1
1000 TABLET ORAL ONCE
Refills: 0 | Status: DISCONTINUED | OUTPATIENT
Start: 2024-12-20 | End: 2024-12-20

## 2024-12-20 RX ADMIN — LEVETIRACETAM 400 MILLIGRAM(S): 1000 TABLET ORAL at 13:00

## 2024-12-20 RX ADMIN — Medication 100 MILLIGRAM(S): at 18:47

## 2024-12-20 RX ADMIN — Medication 1000 MILLIGRAM(S): at 21:29

## 2024-12-20 NOTE — CHART NOTE - NSCHARTNOTEFT_GEN_A_CORE
As per ED RN, pt. is AAOX2, SW unable to contact spouse.  RN able to contact father who is in his 90's and stated that spouse is in [place] for holidays and there is no one to care for pt.  This CM indicated to ED RN if pt. is not safe to go home, then pt. needs to be admitted for social reasons for her safety.  ED attending made aware.  Unit SW to fu in am 12/21/24 on possible other contact/caregivers.

## 2024-12-20 NOTE — ED ADULT NURSE NOTE - NSFALLRISKINTERV_ED_ALL_ED

## 2024-12-20 NOTE — H&P ADULT - NSHPPHYSICALEXAM_GEN_ALL_CORE
VITAL SIGNS:  T(C): 36.7 (12-20-24 @ 21:16), Max: 36.9 (12-20-24 @ 18:30)  T(F): 98.1 (12-20-24 @ 21:16), Max: 98.4 (12-20-24 @ 18:30)  HR: 73 (12-20-24 @ 21:16) (66 - 88)  BP: 148/78 (12-20-24 @ 21:16) (142/82 - 153/78)  BP(mean): --  RR: 17 (12-20-24 @ 21:16) (16 - 18)  SpO2: 95% (12-20-24 @ 21:16) (90% - 95%)  Wt(kg): --    PHYSICAL EXAM:  Constitutional: WDWN resting comfortably in bed; NAD  Head: NC/AT  Eyes: PERRL, EOMI, anicteric sclera  ENT: no nasal discharge; uvula midline, no oropharyngeal erythema or exudates; MMM  Neck: supple; no JVD or thyromegaly  Respiratory: CTA B/L; no W/R/R, no retractions  Cardiac: +S1/S2; RRR; no M/R/G; PMI non-displaced  Gastrointestinal: abdomen soft, NT/ND; no rebound or guarding  Back: spine midline, no bony tenderness or step-offs; no CVAT B/L  Extremities: WWP, no clubbing or cyanosis; no peripheral edema  Musculoskeletal: NROM x4; no joint swelling, tenderness or erythema  Vascular: 2+ radial, DP pulses B/L  Dermatologic: skin warm, dry and intact; no rashes, wounds, or scars  Neurologic: AAOx3; CNII-XII grossly intact; no focal deficits  Psychiatric: affect and characteristics of appearance, verbalizations, behaviors are appropriate VITAL SIGNS:  T(C): 36.7 (12-20-24 @ 21:16), Max: 36.9 (12-20-24 @ 18:30)  T(F): 98.1 (12-20-24 @ 21:16), Max: 98.4 (12-20-24 @ 18:30)  HR: 73 (12-20-24 @ 21:16) (66 - 88)  BP: 148/78 (12-20-24 @ 21:16) (142/82 - 153/78)  BP(mean): --  RR: 17 (12-20-24 @ 21:16) (16 - 18)  SpO2: 95% (12-20-24 @ 21:16) (90% - 95%)  Wt(kg): --    PHYSICAL EXAM:  Constitutional: NAD  Head: NC/AT  Eyes: anicteric sclera  ENT: no oropharyngeal erythema or exudates; MMM  Neck: supple; no JVD or thyromegaly  Respiratory: CTA B/L; no W/R/R  Cardiac: +S1/S2; RRR; no M/R/G  Gastrointestinal: abdomen soft, NT/ND  Extremities: WWP, peripheral edema  Musculoskeletal: NROM x4; no joint swelling  Vascular: 2+ radial, DP pulses B/L  Dermatologic: skin warm, dry and intact  Neurologic: AAOx2-3

## 2024-12-20 NOTE — ED PROVIDER NOTE - PATIENT PORTAL LINK FT
You can access the FollowMyHealth Patient Portal offered by [facility] by registering at the following website: [email or web address]. By joining [facility]’s FollowMyHealth portal, you will also be able to view your health information using other applications (apps) compatible with our system.

## 2024-12-20 NOTE — ED PROVIDER NOTE - NSFOLLOWUPINSTRUCTIONS_ED_ALL_ED_FT
Seizure, Adult  A seizure is a sudden burst of abnormal activity in the brain. Seizures usually last from 30 seconds to 2 minutes.    There are many types of seizures. And they can cause many different symptoms.    What are the causes?  Common causes of a seizure include:  Fever or infection.  Problems that affect the brain. These may include:  A brain or head injury.  A stroke.  A brain tumor.  Low levels of blood sugar or salt.  Kidney problems or liver problems.  Some inherited conditions. These are passed down from parent to child.  Problems with a substance, such as:  Having a reaction to a drug or a medicine.  Stopping the use of a substance all of a sudden. When this causes problems, it's called withdrawal.  Disorders that affect how you develop, such as autism spectrum disorder or cerebral palsy.  Sometimes, the cause may not be known. Some people who have a seizure never have another one. A person who has repeated seizures over time without a clear cause has a condition called epilepsy.    What increases the risk?  Having a family history of epilepsy.  Having had a tonic–clonic seizure before. This type of seizure causes:  The muscles of the whole body to tighten, or contract.  Loss of consciousness.  Having a head injury or a stroke in the past.  Having had too little oxygen at birth.  What are the signs or symptoms?  The symptoms vary depending on the type of seizure you have.    Symptoms during a seizure    Having convulsions. This means shaking with fast, jerky movements of muscles.  Stiffness of the body.  Breathing problems.  Being confused.  Staring or not responding to sound or touch.  Head nodding, eye blinking, eye twitching, or fast eye movements.  Drooling, grunting, or making clicking sounds with your mouth.  Losing control of when you pee or poop.  Symptoms before a seizure    Feeling afraid, worried, or nervous.  Feeling like you may vomit.  Vertigo. This feels like:  You are moving when you're not.  Things around you are moving when they're not.  Déjà vu. This is a feeling of having seen or heard something before.  Odd tastes or smells.  Changes in how you see. You may see flashing lights or spots.  Symptoms after a seizure    Being confused.  Feeling sleepy.  Headache.  Sore muscles.  How is this diagnosed?  A seizure may be diagnosed based on:  A description of your symptoms. Video of your seizures can be helpful.  Your medical history.  A physical exam.  Tests, such as:  Blood tests.  CT scan.  MRI.  Electroencephalogram, or EEG. This test measures electrical activity in the brain.  A test of your spinal fluid. This is called a spinal tap or lumbar puncture.  How is this treated?  If your seizure stops on its own, you will not need treatment. If your seizure lasts longer than 5 minutes, you'll normally need treatment. This may include:  Medicines given through an IV.  Avoiding things, such as medicines, that are known to cause your seizures.  Medicines to prevent seizures. These are called antiepileptics.  A device to prevent or control seizures.  Eating foods that are low in carbohydrates and high in fat (ketogenic diet).  Surgery. This is sometimes needed if you keep having seizures.  Follow these instructions at home:  Medicines    Take your medicines only as told by your health care provider.  Avoid anything that may keep your medicine from working, such as alcohol.  Activity    Follow your provider's advice about driving, swimming, and doing other things that would be dangerous if you had a seizure. Wait until your provider says it's safe for you to do these things.  If you live in the [place] ask your local department of [employer] [place] vehicles (DM[employer]) when you can drive.  Get enough rest and sleep. Not getting enough sleep can make seizures more likely to happen.  Teaching others    A person helping someone who is on the ground having a seizure. The helper carefully turns the person onto their side.  Teach friends and family what to do if you have a seizure. Tell them to:  Help you get down to the ground safely.  Protect your head and body.  Loosen any clothing around your neck.  Turn you on your side. This helps keep your airway clear if you vomit.  Know whether or not you need emergency care.  Stay with you until you are better.  Also, tell them what not to do if you have a seizure. Tell them:  They should not hold you down.  They should not put anything in your mouth.  General instructions    Avoid anything that has caused you to have seizures.  Keep a seizure diary. Write down:  What you remember about each seizure.  What you think might have caused each seizure.  Keep all follow-up visits. Your provider may need to monitor your progress.  Contact a health care provider if:  You have another seizure or seizures. Call each time you have a seizure.  You have a change in how often or when you have seizures.  You keep having seizures with treatment.  You have symptoms of being sick or having an infection.  You are not able to take your medicine.  Get help right away if:  You have or someone has seen you have:  A seizure that lasts longer than 5 minutes.  Many seizures in a row and you don't feel better between seizures.  A seizure that makes it harder to breathe.  A seizure that leaves you unable to speak or use a part of your body.  You didn't wake up right away after a seizure.  You injure yourself during a seizure.  You have confusion or pain right after a seizure.  These symptoms may be an emergency. Call 911 right away.  Do not wait to see if the symptoms will go away.  Do not drive yourself to the hospital.  This information is not intended to replace advice given to you by your health care provider. Make sure you discuss any questions you have with your health care provider.

## 2024-12-20 NOTE — H&P ADULT - NSHPLABSRESULTS_GEN_ALL_CORE
VITAL SIGNS:  T(C): 36.7 (12-20-24 @ 21:16), Max: 36.9 (12-20-24 @ 18:30)  T(F): 98.1 (12-20-24 @ 21:16), Max: 98.4 (12-20-24 @ 18:30)  HR: 73 (12-20-24 @ 21:16) (66 - 88)  BP: 148/78 (12-20-24 @ 21:16) (142/82 - 153/78)  BP(mean): --  RR: 17 (12-20-24 @ 21:16) (16 - 18)  SpO2: 95% (12-20-24 @ 21:16) (90% - 95%)  Wt(kg): --    PHYSICAL EXAM:  Constitutional: WDWN resting comfortably in bed; NAD  Head: NC/AT  Eyes: PERRL, EOMI, anicteric sclera  ENT: no nasal discharge; uvula midline, no oropharyngeal erythema or exudates; MMM  Neck: supple; no JVD or thyromegaly  Respiratory: CTA B/L; no W/R/R, no retractions  Cardiac: +S1/S2; RRR; no M/R/G; PMI non-displaced  Gastrointestinal: abdomen soft, NT/ND; no rebound or guarding  Back: spine midline, no bony tenderness or step-offs; no CVAT B/L  Extremities: WWP, no clubbing or cyanosis; no peripheral edema  Musculoskeletal: NROM x4; no joint swelling, tenderness or erythema  Vascular: 2+ radial, DP pulses B/L  Dermatologic: skin warm, dry and intact; no rashes, wounds, or scars  Neurologic: AAOx3; CNII-XII grossly intact; no focal deficits  Psychiatric: affect and characteristics of appearance, verbalizations, behaviors are appropriate LABS:                        12.8   8.54  )-----------( 215      ( 20 Dec 2024 12:24 )             37.0     12-20    136  |  96  |  9   ----------------------------<  186[H]  3.8   |  29  |  0.33[L]    Ca    9.8      20 Dec 2024 12:24  Mg     1.6     12-20    TPro  7.3  /  Alb  4.6  /  TBili  0.6  /  DBili  x   /  AST  87[H]  /  ALT  54[H]  /  AlkPhos  88  12-20      Fingerstick  glucose:       RADIOLOGY & ADDITIONAL TESTS: Reviewed.              CT Head No Cont:   ACC: [id number] EXAM:  CT BRAIN   ORDERED BY: JONATAN JAMES     PROCEDURE DATE:  12/20/2024          INTERPRETATION:  CLINICAL INFORMATION: head injury    COMPARISON: None CT of the head performed on 3/3/2024    CONTRAST:  IV Contrast: NONE  .    Technique: CT head was performed utilizing axial images from the base of   the skull through the vertex without the administration of intravenous   contrast. Images were reviewed in the bone, brain and subdural windows.    Findings:  There is no evidence of acute intracranial hemorrhage or acute   transcortical infarct.  The ventricles are normal in size and caliber.  There is no evidence of mass-effect or midline shift. There is no   evidence of an intra or extra-axial fluid collection.    Visualized paranasal sinuses and bilateral mastoid air cells are clear.  No acute calvarial fractures are seen.    Impression: No acute intracranial injury        --- End of Report ---            HORTENCIA BUSTAMANTE MD; Attending Radiologist  This document has been electronically signed. Dec 20 2024  1:35PM (12-20-24 @ 13:20)

## 2024-12-20 NOTE — ED ADULT NURSE REASSESSMENT NOTE - NS ED NURSE REASSESS COMMENT FT1
Received report from Dylan SANDERS . Received patient in stretcher. AOX4. Vital signs as noted in flowsheet.  Patient denies chest pain, pain, discomfort, shortness of breath, difficulty breathing and any form of distress not noted. Patient oriented to ED area. Plan of care discussed and verbalized understanding. All needs attended. Fall risk precautions maintained. Purposeful proactive hourly rounding in progress.

## 2024-12-20 NOTE — H&P ADULT - PROBLEM SELECTOR PLAN 5
F: none   E: rplete as needed  N: DASH  GI : none   DVT: lovenox   Code: full F: none   E: replete as needed  N: DASH  GI : none   DVT: lovenox   Code: full

## 2024-12-20 NOTE — H&P ADULT - PROBLEM SELECTOR PLAN 1
Per EMS, patient witnessed to have seizure lasting 6 min. Unclear if received anything in the field. FS was 220. Per [facility], picked up Keppra 500mg Rx on 12/11. Seizure possibly occurred iso inconsistent medication compliance vs alcohol withdrawal. No other clear triggers from history or labs. S/p Keppra 1g IV in ED    - f/u levetiracetam level   - resume home keppra 500 BID H/o seizure disorder, suspected related to alcohol withdrawal but not entirely clear. Followed up with Neuro OP 10/1 last. Per EMS, patient witnessed to have seizure lasting 6 min. Unclear if received anything in the field. FS was 220. Per [facility], picked up Keppra 500mg Rx on 12/11. Seizure possibly occurred iso inconsistent medication compliance vs alcohol withdrawal. No other clear triggers from history or labs. S/p Keppra 1g IV in ED    - f/u levetiracetam level   - resume home keppra 500 BID  - seizure precautions   - Epilepsy consult in AM

## 2024-12-20 NOTE — H&P ADULT - ATTENDING COMMENTS
58 F PMH HTN, Cdiff, alcohol use disorder, alcohol withdrawal w/ seizures, ?underlying seizure disorder, presents for evaluation following a witnessed seizure. likely 2/2 medication non compliance. no s/s of withdrawal currently.  Pt seen at bedside. in NAD. feels well, does not remember event leading up to ED visit, thinks she had a seizure. denies any symptoms currently. On PE: AO x3, no FND. MMM, no JVD, CTA b/l lung fields, no murmur s1/s2. abd soft, NT, no LE edema. CIWA 0     Plan   -resume home keppra 500mg BID  -seizure precautions   -monitor CIWA   -c/w thiamine, folate, mwi   -epilepsy consult in am

## 2024-12-20 NOTE — ED PROVIDER NOTE - CLINICAL SUMMARY MEDICAL DECISION MAKING FREE TEXT BOX
60 yo F w/ PMHx of alcohol abuse and seizures presents for evaluation following a possible seizure.     I reviewed patient's recent outpatient neurology follow-up visit. She is on Kkeppra 750mg BID and Phenobarbital 32.4mg QHS 58 yo F w/ PMHx of alcohol abuse and seizures presents for evaluation following a possible seizure.     I reviewed patient's recent outpatient neurology follow-up visit. She is on Keppra 750mg BID and Phenobarbital 32.4mg QHS 60 yo F w/ PMHx of alcohol abuse and seizures presents for evaluation following a possible seizure.     I reviewed patient's recent outpatient neurology follow-up visit. She is on Keppra 750mg BID and Phenobarbital 32.4mg QHS    Normal neurologic exam here. CT head normal. CXR clear. Treated with Keppra here.    No significant acute leukocytosis, anemia, or electrolyte abnormalities.    UA with evidence of possible UTI. Treated with Rocephin. No evidence of pyelo or sepsis.    Neurology consulted, likely medication non-compliance.     Patient's significant other out of town. Patient lives with him. She is alone now. She does not feel safe being discharged. CM consulted. No safe discharge plan.    Discussed case with hospitalist, will admit.     HISTORY OF C DIFF BUT PATIENT HAS -NO- CURRENT DIARRHEA.

## 2024-12-20 NOTE — ED PROVIDER NOTE - AXIS
Pt Son states Pt is having issues with Nerve pain in her back and would like a Medrol Dos sabiha.
Normal

## 2024-12-20 NOTE — H&P ADULT - ASSESSMENT
58 F PMH HTN, Cdiff, alcohol use disorder, seizures, presents for evaluation following a seizure, suspected in the setting of medication non compliance vs less likely alcohol withdrawal.

## 2024-12-20 NOTE — H&P ADULT - PROBLEM SELECTOR PLAN 2
Admits to 1-2 glasses of wine nightly. Denies h/o withdrawal seizures. Does not recall last time she went days without drinking. States last drink was 12/19 PM with dinner. Alc <10 on arrival. Currently in withdrawal window.     - DENIS   - c/w folic acid 1mg qd , MV qd Admits to 1-2 glasses of wine nightly. Denies h/o withdrawal seizures. Does not recall last time she went days without drinking. States last drink was 12/19 PM with dinner. CIWA 0 on exam. Alc <10 on arrival. Currently in withdrawal window.     - CIWA   - c/w folic acid 1 qd , MV qd, thiamine 500 qd

## 2024-12-20 NOTE — H&P ADULT - PROBLEM SELECTOR PLAN 4
Found to have UA +few bacteria, 11 WBC, and +epithelial cells, s/o contaminated sample. Denies any urinary sxs. S/p CTX 1g in ED.     - monitor off abx

## 2024-12-20 NOTE — ED PROVIDER NOTE - OBJECTIVE STATEMENT
60 yo F w/ PMHx of alcohol abuse and seizures presents for evaluation following a possible seizure. 58 yo F w/ PMHx of alcohol abuse and seizures presents for evaluation following a possible seizure. Patient states that she sometimes has seizures where she feels shaky and then passes out. She is not sure if she hit her head or not today. She has no pain. No pain to her head, neck, back, chest, abdomen, or extremities. No shortness of breath, dizziness, or light-headedness. She is not sure who her neurologist is or what her seizure medication.

## 2024-12-20 NOTE — ED ADULT NURSE NOTE - OBJECTIVE STATEMENT
59 y.o female A&Ox2 (oriented to person, place) PMHx alcohol abuse and seizures BIBEMS presents to ED for evaluation following possible seizure. Per EMS, pt A&Ox2 at baseline. Per EMS, pt experienced a witnessed seizure that lasted six minutes. On exam, pt awake and alert, denies any pain or symptoms. Pt denies chest pain, SOB or dizziness. Pt does not recall last drink ("either yesterday or the day before) or which seizure medication she takes. Pt speaking in clear, full sentences, respirations even and unlabored.

## 2024-12-20 NOTE — H&P ADULT - NSHPREVIEWOFSYSTEMS_GEN_ALL_CORE
REVIEW OF SYSTEMS:  CONSTITUTIONAL: No weakness, fevers, chills, changes in weight  EYES/ENT: No visual changes;  No vertigo or throat pain   NECK: No pain or stiffness  RESPIRATORY: No cough, wheezing, hemoptysis; No shortness of breath  CARDIOVASCULAR: no chest pain or palpitations  GASTROINTESTINAL: No abdominal or epigastric pain. No nausea, vomiting, or hematemesis; No diarrhea or constipation. No melena or hematochezia.  GENITOURINARY: No dysuria, frequency or hematuria  NEUROLOGICAL: No numbness or weakness  SKIN: No itching, rashes REVIEW OF SYSTEMS:  CONSTITUTIONAL: No weakness, fevers, chills, changes in weight  EYES/ENT: No visual changes;  No vertigo or throat pain   NECK: No pain or stiffness  RESPIRATORY: No cough, wheezing, hemoptysis; No shortness of breath  CARDIOVASCULAR: No chest pain or palpitations  GASTROINTESTINAL: No abdominal or epigastric pain. No nausea, vomiting, or hematemesis; No diarrhea or constipation. No melena or hematochezia.  GENITOURINARY: No dysuria, frequency or hematuria  NEUROLOGICAL: No numbness or weakness  SKIN: No itching, rashes

## 2024-12-20 NOTE — ED PROVIDER NOTE - CARE PLAN
1 Principal Discharge DX:	Seizure   Principal Discharge DX:	Seizure  Secondary Diagnosis:	Urinary tract infection

## 2024-12-20 NOTE — ED ADULT TRIAGE NOTE - CHIEF COMPLAINT QUOTE
BIBEMS from home for a witnessed seizure that lasted 6 min per EMS. hx of seizures. a&ox2 at baseline per EMS. pt awake and alert on ED arrival, does not endorse any complaints.  by EMS, 18g to R AC.

## 2024-12-20 NOTE — H&P ADULT - HISTORY OF PRESENT ILLNESS
58 F PMH HTN, Cdiff, alcohol use disorder, seizures, presents for evaluation following a possible seizure.    In the ED:  Initial vital signs: T: 97.9F, HR: 88, BP: 148/80, R: 16, SpO2: 90% on RA  Labs: significant for WBC 8.54. lactate 1.2, AST 87, ALT 54,   Imaging:  CXR: Heart, lungs and mediastinum are unremarkable. Stable bony structures. Dextro scoliosis  CTH: No acute intracranial injury  EKG: NSR, ?LVH  Medications: CTX 1g, Keppra 1g  Consults: none  58 F PMH HTN, Cdiff, alcohol use disorder, seizures, presents for evaluation following a seizure. Per Triage note, patient BIBEMS from home for a witnessed seizure that lasted 6 min. Patient has poor short term memory and does not recall this. As far as she recalls, she woke up in [place] and went out for errands. She recalls getting back to her apartment but does not recall anything after that. She is unsure if she had a seizure. Her next memory is here in ED. She does not know who called EMS.. Now, she notes mild R sided head ache, possibly hit it, but denies any tongue/lip injury, bruises, limb/joint pain, incontinence. Does not recall seizure meds; unclear compliance. Drinks 1-2 glasses wine daily, sometimes more. States last drink likely 12/19 night.     Of note, ED CM indicated to ED RN if pt is not safe to go home, then pt. needs to be admitted for social reasons for her safety.    In the ED:  Initial vital signs: T: 97.9F, HR: 88, BP: 148/80, R: 16, SpO2: 90% on RA  Labs: significant for WBC 8.54. lactate 1.2, AST 87, ALT 54, alcohol <10, UA +11 WBC, few bacteria, +epithelial cells, +ketones  Imaging:  CXR: Heart, lungs and mediastinum are unremarkable. Stable bony structures. Dextro scoliosis  CTH: No acute intracranial injury  EKG: NSR, ?LVH  Medications: CTX 1g, Keppra 1g  Consults: none

## 2024-12-21 ENCOUNTER — TRANSCRIPTION ENCOUNTER (OUTPATIENT)
Age: 59
End: 2024-12-21

## 2024-12-21 ENCOUNTER — EMERGENCY (EMERGENCY)
Facility: HOSPITAL | Age: 59
LOS: 1 days | Discharge: HOME | End: 2024-12-21
Attending: EMERGENCY MEDICINE | Admitting: EMERGENCY MEDICINE
Payer: COMMERCIAL

## 2024-12-21 VITALS
DIASTOLIC BLOOD PRESSURE: 99 MMHG | RESPIRATION RATE: 18 BRPM | TEMPERATURE: 98 F | WEIGHT: 110.01 LBS | HEART RATE: 91 BPM | HEIGHT: 62 IN | OXYGEN SATURATION: 97 % | SYSTOLIC BLOOD PRESSURE: 145 MMHG

## 2024-12-21 VITALS
HEART RATE: 63 BPM | TEMPERATURE: 98 F | OXYGEN SATURATION: 94 % | RESPIRATION RATE: 18 BRPM | DIASTOLIC BLOOD PRESSURE: 79 MMHG | SYSTOLIC BLOOD PRESSURE: 127 MMHG

## 2024-12-21 VITALS
TEMPERATURE: 98 F | OXYGEN SATURATION: 98 % | SYSTOLIC BLOOD PRESSURE: 137 MMHG | DIASTOLIC BLOOD PRESSURE: 82 MMHG | RESPIRATION RATE: 17 BRPM | HEART RATE: 80 BPM

## 2024-12-21 DIAGNOSIS — I10 ESSENTIAL (PRIMARY) HYPERTENSION: ICD-10-CM

## 2024-12-21 DIAGNOSIS — Z29.9 ENCOUNTER FOR PROPHYLACTIC MEASURES, UNSPECIFIED: ICD-10-CM

## 2024-12-21 DIAGNOSIS — R82.71 BACTERIURIA: ICD-10-CM

## 2024-12-21 DIAGNOSIS — G40.909 EPILEPSY, UNSPECIFIED, NOT INTRACTABLE, WITHOUT STATUS EPILEPTICUS: ICD-10-CM

## 2024-12-21 DIAGNOSIS — F10.10 ALCOHOL ABUSE, UNCOMPLICATED: ICD-10-CM

## 2024-12-21 LAB
ANION GAP SERPL CALC-SCNC: 12 MMOL/L — SIGNIFICANT CHANGE UP (ref 5–17)
BASOPHILS # BLD AUTO: 0.04 K/UL — SIGNIFICANT CHANGE UP (ref 0–0.2)
BASOPHILS NFR BLD AUTO: 0.7 % — SIGNIFICANT CHANGE UP (ref 0–2)
BUN SERPL-MCNC: 5 MG/DL — LOW (ref 7–23)
CALCIUM SERPL-MCNC: 10.3 MG/DL — SIGNIFICANT CHANGE UP (ref 8.4–10.5)
CHLORIDE SERPL-SCNC: 96 MMOL/L — SIGNIFICANT CHANGE UP (ref 96–108)
CO2 SERPL-SCNC: 27 MMOL/L — SIGNIFICANT CHANGE UP (ref 22–31)
CREAT SERPL-MCNC: 0.46 MG/DL — LOW (ref 0.5–1.3)
EGFR: 110 ML/MIN/1.73M2 — SIGNIFICANT CHANGE UP
EOSINOPHIL # BLD AUTO: 0.05 K/UL — SIGNIFICANT CHANGE UP (ref 0–0.5)
EOSINOPHIL NFR BLD AUTO: 0.8 % — SIGNIFICANT CHANGE UP (ref 0–6)
ETHANOL SERPL-MCNC: <10 MG/DL — SIGNIFICANT CHANGE UP (ref 0–10)
GLUCOSE SERPL-MCNC: 120 MG/DL — HIGH (ref 70–99)
HCT VFR BLD CALC: 42.5 % — SIGNIFICANT CHANGE UP (ref 34.5–45)
HGB BLD-MCNC: 14.5 G/DL — SIGNIFICANT CHANGE UP (ref 11.5–15.5)
IMM GRANULOCYTES NFR BLD AUTO: 0.2 % — SIGNIFICANT CHANGE UP (ref 0–0.9)
LYMPHOCYTES # BLD AUTO: 1.22 K/UL — SIGNIFICANT CHANGE UP (ref 1–3.3)
LYMPHOCYTES # BLD AUTO: 20 % — SIGNIFICANT CHANGE UP (ref 13–44)
MCHC RBC-ENTMCNC: 34.1 G/DL — SIGNIFICANT CHANGE UP (ref 32–36)
MCHC RBC-ENTMCNC: 34.2 PG — HIGH (ref 27–34)
MCV RBC AUTO: 100.2 FL — HIGH (ref 80–100)
MONOCYTES # BLD AUTO: 0.7 K/UL — SIGNIFICANT CHANGE UP (ref 0–0.9)
MONOCYTES NFR BLD AUTO: 11.5 % — SIGNIFICANT CHANGE UP (ref 2–14)
NEUTROPHILS # BLD AUTO: 4.09 K/UL — SIGNIFICANT CHANGE UP (ref 1.8–7.4)
NEUTROPHILS NFR BLD AUTO: 66.8 % — SIGNIFICANT CHANGE UP (ref 43–77)
NRBC # BLD: 0 /100 WBCS — SIGNIFICANT CHANGE UP (ref 0–0)
PLATELET # BLD AUTO: 183 K/UL — SIGNIFICANT CHANGE UP (ref 150–400)
POTASSIUM SERPL-MCNC: 3.6 MMOL/L — SIGNIFICANT CHANGE UP (ref 3.5–5.3)
POTASSIUM SERPL-SCNC: 3.6 MMOL/L — SIGNIFICANT CHANGE UP (ref 3.5–5.3)
RBC # BLD: 4.24 M/UL — SIGNIFICANT CHANGE UP (ref 3.8–5.2)
RBC # FLD: 14.1 % — SIGNIFICANT CHANGE UP (ref 10.3–14.5)
SODIUM SERPL-SCNC: 135 MMOL/L — SIGNIFICANT CHANGE UP (ref 135–145)
WBC # BLD: 6.11 K/UL — SIGNIFICANT CHANGE UP (ref 3.8–10.5)
WBC # FLD AUTO: 6.11 K/UL — SIGNIFICANT CHANGE UP (ref 3.8–10.5)

## 2024-12-21 PROCEDURE — 70450 CT HEAD/BRAIN W/O DYE: CPT | Mod: MC

## 2024-12-21 PROCEDURE — 85025 COMPLETE CBC W/AUTO DIFF WBC: CPT

## 2024-12-21 PROCEDURE — 80177 DRUG SCRN QUAN LEVETIRACETAM: CPT

## 2024-12-21 PROCEDURE — 80307 DRUG TEST PRSMV CHEM ANLYZR: CPT

## 2024-12-21 PROCEDURE — 80053 COMPREHEN METABOLIC PANEL: CPT

## 2024-12-21 PROCEDURE — 99285 EMERGENCY DEPT VISIT HI MDM: CPT

## 2024-12-21 PROCEDURE — 81001 URINALYSIS AUTO W/SCOPE: CPT

## 2024-12-21 PROCEDURE — 96366 THER/PROPH/DIAG IV INF ADDON: CPT

## 2024-12-21 PROCEDURE — 96365 THER/PROPH/DIAG IV INF INIT: CPT

## 2024-12-21 PROCEDURE — 96375 TX/PRO/DX INJ NEW DRUG ADDON: CPT

## 2024-12-21 PROCEDURE — G0378: CPT

## 2024-12-21 PROCEDURE — 83605 ASSAY OF LACTIC ACID: CPT

## 2024-12-21 PROCEDURE — 93005 ELECTROCARDIOGRAM TRACING: CPT

## 2024-12-21 PROCEDURE — 83735 ASSAY OF MAGNESIUM: CPT

## 2024-12-21 PROCEDURE — 36415 COLL VENOUS BLD VENIPUNCTURE: CPT

## 2024-12-21 PROCEDURE — 84484 ASSAY OF TROPONIN QUANT: CPT

## 2024-12-21 PROCEDURE — 99239 HOSP IP/OBS DSCHRG MGMT >30: CPT | Mod: GC

## 2024-12-21 PROCEDURE — 99283 EMERGENCY DEPT VISIT LOW MDM: CPT

## 2024-12-21 PROCEDURE — 99284 EMERGENCY DEPT VISIT MOD MDM: CPT

## 2024-12-21 PROCEDURE — 80048 BASIC METABOLIC PNL TOTAL CA: CPT

## 2024-12-21 PROCEDURE — 71045 X-RAY EXAM CHEST 1 VIEW: CPT

## 2024-12-21 RX ORDER — LEVETIRACETAM 1000 MG/1
500 TABLET ORAL
Refills: 0 | Status: DISCONTINUED | OUTPATIENT
Start: 2024-12-21 | End: 2024-12-21

## 2024-12-21 RX ORDER — LEVETIRACETAM 1000 MG/1
1 TABLET ORAL
Qty: 28 | Refills: 0
Start: 2024-12-21 | End: 2025-01-03

## 2024-12-21 RX ORDER — AMLODIPINE BESYLATE 10 MG/1
1 TABLET ORAL
Qty: 0 | Refills: 0 | DISCHARGE

## 2024-12-21 RX ORDER — CYANOCOBALAMIN/FOLIC AC/VIT B6 1-2.2-25MG
1 TABLET ORAL DAILY
Refills: 0 | Status: DISCONTINUED | OUTPATIENT
Start: 2024-12-21 | End: 2024-12-21

## 2024-12-21 RX ORDER — LANOLIN ALCOHOL/MO/W.PET/CERES
500 CREAM (GRAM) TOPICAL DAILY
Refills: 0 | Status: DISCONTINUED | OUTPATIENT
Start: 2024-12-21 | End: 2024-12-21

## 2024-12-21 RX ORDER — ENOXAPARIN SODIUM 30 MG/.3ML
40 INJECTION SUBCUTANEOUS EVERY 24 HOURS
Refills: 0 | Status: DISCONTINUED | OUTPATIENT
Start: 2024-12-21 | End: 2024-12-21

## 2024-12-21 RX ORDER — AMLODIPINE BESYLATE 10 MG/1
2.5 TABLET ORAL DAILY
Refills: 0 | Status: DISCONTINUED | OUTPATIENT
Start: 2024-12-21 | End: 2024-12-21

## 2024-12-21 RX ORDER — GLUCOSAMINE SULFATE DIPOT CHLR 500 MG
1 CAPSULE ORAL DAILY
Refills: 0 | Status: DISCONTINUED | OUTPATIENT
Start: 2024-12-21 | End: 2024-12-21

## 2024-12-21 RX ORDER — FERROUS SULFATE 325(65) MG
325 TABLET ORAL
Refills: 0 | DISCHARGE

## 2024-12-21 RX ORDER — LEVETIRACETAM 1000 MG/1
500 TABLET ORAL ONCE
Refills: 0 | Status: COMPLETED | OUTPATIENT
Start: 2024-12-21 | End: 2024-12-21

## 2024-12-21 RX ORDER — GLUCOSAMINE SULFATE DIPOT CHLR 500 MG
1 CAPSULE ORAL
Refills: 0 | DISCHARGE

## 2024-12-21 RX ADMIN — LEVETIRACETAM 500 MILLIGRAM(S): 1000 TABLET ORAL at 06:30

## 2024-12-21 RX ADMIN — Medication 105 MILLIGRAM(S): at 06:30

## 2024-12-21 RX ADMIN — LEVETIRACETAM 500 MILLIGRAM(S): 1000 TABLET ORAL at 11:01

## 2024-12-21 RX ADMIN — AMLODIPINE BESYLATE 2.5 MILLIGRAM(S): 10 TABLET ORAL at 06:29

## 2024-12-21 NOTE — ED PROVIDER NOTE - ATTENDING APP SHARED VISIT CONTRIBUTION OF CARE
The pt is a 58 y/o F, who returns to ED, stating "they tricked me into leaving from upstairs, but I'm here for follow up" - pt was admitted for ? sz (alcohol withdrawal vs sz due to medication non compliance). Upon chart review - noted that pt signed out ama "to get some fresh air", pt states that she wanted to smoke. Denies sz, ha, dizziness, cp, sob, n/v/d, abd pain, alcohol or drug ingestion today.    pt signed out ama earlier - wanted to go smoke, returns for "follow up" - upon chart review noted that was admitted for sz (alcohol withdrawal vs non compliance w/meds), no signs of etoh withdrawal in ed, labs wnl, hemodynamically stable, given po keppra dose - discussed w/admitting team - do not feel that pt needs re admission - to take keppra and f/u w/neuro, pt understands and agrees w/plan, strict return precautions given    Addendum to attending statement: I have reviewed the ACP note and agree with the history, exam, and plan of care. I  was available to PA   as a supervising provider if needed. PA given opportunity to ask questions and request further evaluation / care.    Pt well appearing in ED with no seizure activity  Prior admission for withdrawal vs seizure -  VSS, labs wnl  Received regular dose of keppra and PA discussed with SANDRA who states pt no longer requires medical admission  Pt discharged with strict return precautions and follow up

## 2024-12-21 NOTE — DISCHARGE NOTE PROVIDER - ATTENDING ATTESTATION STATEMENT
I have personally seen and examined the patient. I have collaborated with and supervised the
Not applicable

## 2024-12-21 NOTE — DISCHARGE NOTE PROVIDER - NSDCMRMEDTOKEN_GEN_ALL_CORE_FT
amLODIPine 2.5 mg oral tablet: 1 tab(s) orally once a day  atorvastatin 40 mg oral tablet: 1 tab(s) orally once a day (at bedtime)  ferrous sulfate: 325 milligram(s) orally every other day  folic acid: 1 milligram(s) orally once a day  Keppra 500 mg oral tablet: 1 tab(s) orally 2 times a day  levETIRAcetam 500 mg oral tablet: 1 tab(s) orally 2 times a day Take in PM  Multiple Vitamins oral tablet: 1 tab(s) orally once a day

## 2024-12-21 NOTE — DISCHARGE NOTE PROVIDER - ATTENDING DISCHARGE PHYSICAL EXAMINATION:
Discharge AMA, see chart notes on 12/21, full exam unable to be performed. Advised to return to the hospital for treatment.     Appeared comfortable   MMM  Normal WOB on RA  Abdomen soft, nondistended  No lower extremity edema   No gross focal neuro deficits

## 2024-12-21 NOTE — ED PROVIDER NOTE - NEUROLOGICAL, MLM
Report called to HERNAN Cadena    Alert and oriented, no focal deficits, no motor or sensory deficits. no tremor, normal gait, romberg neg

## 2024-12-21 NOTE — DISCHARGE NOTE PROVIDER - NSDCCPCAREPLAN_GEN_ALL_CORE_FT
PRINCIPAL DISCHARGE DIAGNOSIS  Diagnosis: Seizure  Assessment and Plan of Treatment: We were unable to complete our workup prior to leaving against medical advice.

## 2024-12-21 NOTE — DISCHARGE NOTE PROVIDER - NSDCFUSCHEDAPPT_GEN_ALL_CORE_FT
Meron Cano  [facility]  DERM [address]  Scheduled Appointment: 01/06/2025    Pretty Pittman  [facility]  NEUROLOGY [address]  Scheduled Appointment: 01/07/2025

## 2024-12-21 NOTE — CHART NOTE - NSCHARTNOTEFT_GEN_A_CORE
Please refer to chart note by Dr. Cortes on 12/21, agree with all documented there.     Patient was observed with her clothing and cane walking down the [place] this morning and announced that she was going outside for fresh air. Multiple staff members from nursing as well as myself told her that she cannot come and go from the hospital as she pleases due to security rules but also due to her medical need to be monitored in the setting of recent prolonged seizure of undetermined etiology. She insisted that she wanted to go outside and would be back later. I personally explained to her the risks including falls, injury from another seizure and even death. I explained the medical necessity to remain admitted so that she can be evaluated this morning by the epilepsy team and can be monitored to keep her safe. She stated that she understood our plans but wanted to go outside for a while first. I attempted to convince her to stay through the day at least to be evaluated properly but she again refused. IV was removed. In hallway with her RN present, myself, and Dr. Cortes she was able to clearly state back to us the risks of leaving including falling, injuries, and even death as well as delayed treatment as she would have to come in through ED again. She demonstrated adequate understanding of her current medical situation. She signed AMA paperwork in front of us which was placed in chart. As she was walking away, I reminded her that our medical opinion is that she should return immediately, she stated she would return to ED after being able to go outside for a while.

## 2024-12-21 NOTE — ED PROVIDER NOTE - CARE PROVIDER_API CALL
Jeremiah Cantrell  Neurology  [address]  [place] [address]  Phone: [phone]  Fax: [phone]  Follow Up Time:     Pretty Pittman  Neurology  [address]  [place] [address]  Phone: [phone]  Fax: [phone]  Follow Up Time:

## 2024-12-21 NOTE — ED ADULT TRIAGE NOTE - CHIEF COMPLAINT QUOTE
Pt arrived to ED for med eval. Pt was a pt on 7U, AMA this morning at 940am. Pt stated "I just went for a walk and now they wont let me back on the floor". Pt reprots she is unaware of the fact she signed AMA forms. Pt requesting to be evaluated again for admission. Pt was admitted for seizures and fall. Pt hx of etoh abuse, denies alcohol or drug use. Pt denies any new complaints or symptoms between time of AMA and triage.

## 2024-12-21 NOTE — PATIENT PROFILE ADULT - FALL HARM RISK - RISK INTERVENTIONS

## 2024-12-21 NOTE — ED PROVIDER NOTE - OBJECTIVE STATEMENT
The pt is a 60 y/o F, who returns to ED, stating "they tricked me into leaving from upstairs, but I'm here for follow up" - pt was admitted for ? sz (alcohol withdrawal vs sz due to medication non compliance). Upon chart review - noted that pt signed out ama "to get some fresh air", pt states that she wanted to smoke. Denies sz, ha, dizziness, cp, sob, n/v/d, abd pain, alcohol or drug ingestion today.

## 2024-12-21 NOTE — ED PROVIDER NOTE - CLINICAL SUMMARY MEDICAL DECISION MAKING FREE TEXT BOX
pt signed out ama earlier - wanted to go smoke, returns for "follow up" - upon chart review noted that was admitted for sz (alcohol withdrawal vs non compliance w/meds), no signs of etoh withdrawal in ed, labs wnl, hemodynamically stable, given po keppra dose - discussed w/admitting team - do not feel that pt needs re admission - to take keppra and f/u w/neuro, pt understands and agrees w/plan, strict return precautions given

## 2024-12-21 NOTE — CHART NOTE - NSCHARTNOTEFT_GEN_A_CORE
Called by nurse to evaluate patient due to patient wanting to sign out AMA. Asked patient why she wanted to leave, reports that she needs a breath of fresh air and that she would be back imminently. Informed patient that leaving for that period would be considered against medical advice, as detailed for reasons below, and that returning to [facility] would require a re-admission and return through the emergency department.     Patient is AAO x 3. Dr. Tang and I explained at length to the patient the risks of signing out AMA including but not limited to harm, injury or death. In particular, given the seizure history and unclear episode on admission, as well as Keppra non-compliance reported by patient, we would need to closely monitor for seizure activity. She repeated back the risks in her own words.     Dr. Tang and I explained the risks, benefits and alternatives to treatment as well as the attendant risks of refusing treatment at this time. We offered to answer any questions and fully answered any such questions. We believe that the patient fully understands what has been explained and answered. Hospitalist Dr. Tang with patient at bedside and similarly explaining the same risks. Patient signed form to sign out AMA and accepts responsibility for any and all results of this decision.

## 2024-12-21 NOTE — DISCHARGE NOTE PROVIDER - CARE PROVIDER_API CALL
Pretty Pittman  Neurology  [address]  [place] [address]  Phone: [phone]  Fax: [phone]  Established Patient  Scheduled Appointment: 01/17/2025

## 2024-12-21 NOTE — ED PROVIDER NOTE - NSFOLLOWUPINSTRUCTIONS_ED_ALL_ED_FT
HPI:  65M hx HTN/DM was in the [place] for vacation early January, was confused and not answering appropriately, MRI Jan 7 showed L frontal enhancing mass with significant edema. Repeat CTH showed significant vasogenic edema and 1.5cm MLS  - POD3 L crani for GBM with gleolan    OVERNIGHT EVENTS:   No acute events overnight.    IMAGING:   Recent imaging studies were reviewed.    PHYSICAL EXAM:  General: intubated  CVS: RRR  Pulm: CTAB  GI: Soft, NTND  Extremities: No LE Edema  Neuro: GCS [id number] intubated, EO spont, attends to voice, Follows simple commands, pupils 3mm reactive, b/l UE localize L>R, bilateral LE wd      ICU Vital Signs Last 24 Hrs  T(C): 36.6 (30 Jan 2022 15:00), Max: 37.3 (30 Jan 2022 11:00)  T(F): 97.8 (30 Jan 2022 15:00), Max: 99.1 (30 Jan 2022 11:00)  HR: 74 (31 Jan 2022 02:00) (58 - 85)  ABP: 144/64 (31 Jan 2022 02:00) (132/51 - 158/71)  ABP(mean): 69 (31 Jan 2022 02:00) (69 - 108)  RR: 17 (31 Jan 2022 02:00) (12 - 20)  SpO2: 97% (31 Jan 2022 02:00) (94% - 100%)      01-29-22 @ 07:01  -  01-30-22 @ 07:00  --------------------------------------------------------  IN: 4144.4 mL / OUT: 2000 mL / NET: 2144.4 mL    01-30-22 @ 07:01  -  01-31-22 @ 06:49  --------------------------------------------------------  IN: 2577.5 mL / OUT: 2350 mL / NET: 227.5 mL        Mode: CPAP with PS, FiO2: 40, PEEP: 5, PS: 5, MAP: 8, PIP: 10    acetaminophen     Tablet .. 650 milliGRAM(s) Oral every 6 hours PRN  atorvastatin 20 milliGRAM(s) Oral at bedtime  bisacodyl 5 milliGRAM(s) Oral daily PRN  chlorhexidine 4% Liquid 1 Application(s) Topical daily  dexAMETHasone  Injectable 3 milliGRAM(s) IV Push every 6 hours  dextrose 50% Injectable 12.5 Gram(s) IV Push once  dextrose 50% Injectable 25 Gram(s) IV Push once  dextrose 50% Injectable 50 milliLiter(s) IV Push every 15 minutes  dextrose 50% Injectable 25 milliLiter(s) IV Push every 15 minutes  enoxaparin Injectable 40 milliGRAM(s) SubCutaneous <User Schedule>  glucagon  Injectable 1 milliGRAM(s) IntraMuscular once  insulin regular Infusion 5 Unit(s)/Hr (5 mL/Hr) IV Continuous <Continuous>  lacosamide IVPB 200 milliGRAM(s) IV Intermittent every 12 hours  lisinopril 10 milliGRAM(s) Oral daily  pantoprazole  Injectable 40 milliGRAM(s) IV Push at bedtime  polyethylene glycol 3350 17 Gram(s) Oral every 12 hours  senna 2 Tablet(s) Oral at bedtime  sodium chloride 3 Gram(s) Oral every 6 hours  sodium chloride 2% . 1000 milliLiter(s) (100 mL/Hr) IV Continuous <Continuous>      LABS:  Na: 140 (01-31 @ 02:58), 140 (01-30 @ 18:38), 139 (01-30 @ 10:32), 141 (01-30 @ 02:50), 137 (01-29 @ 18:56), 137 (01-29 @ 10:37), 135 (01-29 @ 01:37), 137 (01-28 @ 19:45)  K: 3.6 (01-31 @ 02:58), 3.9 (01-30 @ 18:38), 3.8 (01-30 @ 10:32), 4.2 (01-30 @ 02:50), 4.0 (01-29 @ 18:56), 4.0 (01-29 @ 10:37), 4.0 (01-29 @ 01:37), 3.9 (01-28 @ 19:45)  Cl: 110 (01-31 @ 02:58), 109 (01-30 @ 18:38), 106 (01-30 @ 10:32), 109 (01-30 @ 02:50), 104 (01-29 @ 18:56), 103 (01-29 @ 10:37), 99 (01-29 @ 01:37), 103 (01-28 @ 19:45)  CO2: 22 (01-31 @ 02:58), 23 (01-30 @ 18:38), 24 (01-30 @ 10:32), 22 (01-30 @ 02:50), 20 (01-29 @ 18:56), 22 (01-29 @ 10:37), 22 (01-29 @ 01:37), 22 (01-28 @ 19:45)  BUN: 15 (01-31 @ 02:58), 14 (01-30 @ 18:38), 14 (01-30 @ 10:32), 15 (01-30 @ 02:50), 16 (01-29 @ 18:56), 12 (01-29 @ 10:37), 12 (01-29 @ 01:37), 12 (01-28 @ 19:45)  Cr: 0.55 (01-31 @ 02:58), 0.55 (01-30 @ 18:38), 0.52 (01-30 @ 10:32), 0.52 (01-30 @ 02:50), 0.54 (01-29 @ 18:56), 0.54 (01-29 @ 10:37), 0.55 (01-29 @ 01:37), 0.62 (01-28 @ 19:45)  Glu: 105(01-31 @ 02:58), 174(01-30 @ 18:38), 121(01-30 @ 10:32), 134(01-30 @ 02:50), 245(01-29 @ 18:56), 219(01-29 @ 10:37), 258(01-29 @ 01:37), 238(01-28 @ 19:45)    Hgb: 10.1 (01-31 @ 02:58), 10.6 (01-30 @ 10:39), 12.9 (01-28 @ 19:45)  Hct: 31.0 (01-31 @ 02:58), 32.3 (01-30 @ 10:39), 38.7 (01-28 @ 19:45)  WBC: 9.64 (01-31 @ 02:58), 12.07 (01-30 @ 10:39), 14.33 (01-28 @ 19:45)  Plt: 114 (01-31 @ 02:58), 98 (01-30 @ 10:39), 106 (01-28 @ 19:45)      ABG - ( 30 Jan 2022 10:41 )  pH, Arterial: 7.48  pH, Blood: x     /  pCO2: 36    /  pO2: 150   / HCO3: 27    / Base Excess: 3.3   /  SaO2: 99.7                   HPI:  65M hx HTN/DM was in [place] for [place] early January, was confused and not answering appropriately, MRI Jan 7 showed L frontal enhancing mass with significant edema. Repeat CTH showed significant vasogenic edema and 1.5cm MLS  - POD3 L crani for GBM with gleolan    OVERNIGHT EVENTS:   No acute events overnight.    IMAGING:   Recent imaging studies were reviewed.    PHYSICAL EXAM:  General: intubated  CVS: RRR  Pulm: CTAB  GI: Soft, NTND  Extremities: No LE Edema  Neuro: EO spont, Aox1, attends to voice, Follows simple commands, pupils 3mm reactive, RUE & RLE 4+/5 w/ drift, LUE& LLE 5/5       ICU Vital Signs Last 24 Hrs  T(C): 36.6 (30 Jan 2022 15:00), Max: 37.3 (30 Jan 2022 11:00)  T(F): 97.8 (30 Jan 2022 15:00), Max: 99.1 (30 Jan 2022 11:00)  HR: 74 (31 Jan 2022 02:00) (58 - 85)  ABP: 144/64 (31 Jan 2022 02:00) (132/51 - 158/71)  ABP(mean): 69 (31 Jan 2022 02:00) (69 - 108)  RR: 17 (31 Jan 2022 02:00) (12 - 20)  SpO2: 97% (31 Jan 2022 02:00) (94% - 100%)      01-29-22 @ 07:01  -  01-30-22 @ 07:00  --------------------------------------------------------  IN: 4144.4 mL / OUT: 2000 mL / NET: 2144.4 mL    01-30-22 @ 07:01  -  01-31-22 @ 06:49  --------------------------------------------------------  IN: 2577.5 mL / OUT: 2350 mL / NET: 227.5 mL        Mode: CPAP with PS, FiO2: 40, PEEP: 5, PS: 5, MAP: 8, PIP: 10    acetaminophen     Tablet .. 650 milliGRAM(s) Oral every 6 hours PRN  atorvastatin 20 milliGRAM(s) Oral at bedtime  bisacodyl 5 milliGRAM(s) Oral daily PRN  chlorhexidine 4% Liquid 1 Application(s) Topical daily  dexAMETHasone  Injectable 3 milliGRAM(s) IV Push every 6 hours  dextrose 50% Injectable 12.5 Gram(s) IV Push once  dextrose 50% Injectable 25 Gram(s) IV Push once  dextrose 50% Injectable 50 milliLiter(s) IV Push every 15 minutes  dextrose 50% Injectable 25 milliLiter(s) IV Push every 15 minutes  enoxaparin Injectable 40 milliGRAM(s) SubCutaneous <User Schedule>  glucagon  Injectable 1 milliGRAM(s) IntraMuscular once  insulin regular Infusion 5 Unit(s)/Hr (5 mL/Hr) IV Continuous <Continuous>  lacosamide IVPB 200 milliGRAM(s) IV Intermittent every 12 hours  lisinopril 10 milliGRAM(s) Oral daily  pantoprazole  Injectable 40 milliGRAM(s) IV Push at bedtime  polyethylene glycol 3350 17 Gram(s) Oral every 12 hours  senna 2 Tablet(s) Oral at bedtime  sodium chloride 3 Gram(s) Oral every 6 hours  sodium chloride 2% . 1000 milliLiter(s) (100 mL/Hr) IV Continuous <Continuous>      LABS:  Na: 140 (01-31 @ 02:58), 140 (01-30 @ 18:38), 139 (01-30 @ 10:32), 141 (01-30 @ 02:50), 137 (01-29 @ 18:56), 137 (01-29 @ 10:37), 135 (01-29 @ 01:37), 137 (01-28 @ 19:45)  K: 3.6 (01-31 @ 02:58), 3.9 (01-30 @ 18:38), 3.8 (01-30 @ 10:32), 4.2 (01-30 @ 02:50), 4.0 (01-29 @ 18:56), 4.0 (01-29 @ 10:37), 4.0 (01-29 @ 01:37), 3.9 (01-28 @ 19:45)  Cl: 110 (01-31 @ 02:58), 109 (01-30 @ 18:38), 106 (01-30 @ 10:32), 109 (01-30 @ 02:50), 104 (01-29 @ 18:56), 103 (01-29 @ 10:37), 99 (01-29 @ 01:37), 103 (01-28 @ 19:45)  CO2: 22 (01-31 @ 02:58), 23 (01-30 @ 18:38), 24 (01-30 @ 10:32), 22 (01-30 @ 02:50), 20 (01-29 @ 18:56), 22 (01-29 @ 10:37), 22 (01-29 @ 01:37), 22 (01-28 @ 19:45)  BUN: 15 (01-31 @ 02:58), 14 (01-30 @ 18:38), 14 (01-30 @ 10:32), 15 (01-30 @ 02:50), 16 (01-29 @ 18:56), 12 (01-29 @ 10:37), 12 (01-29 @ 01:37), 12 (01-28 @ 19:45)  Cr: 0.55 (01-31 @ 02:58), 0.55 (01-30 @ 18:38), 0.52 (01-30 @ 10:32), 0.52 (01-30 @ 02:50), 0.54 (01-29 @ 18:56), 0.54 (01-29 @ 10:37), 0.55 (01-29 @ 01:37), 0.62 (01-28 @ 19:45)  Glu: 105(01-31 @ 02:58), 174(01-30 @ 18:38), 121(01-30 @ 10:32), 134(01-30 @ 02:50), 245(01-29 @ 18:56), 219(01-29 @ 10:37), 258(01-29 @ 01:37), 238(01-28 @ 19:45)    Hgb: 10.1 (01-31 @ 02:58), 10.6 (01-30 @ 10:39), 12.9 (01-28 @ 19:45)  Hct: 31.0 (01-31 @ 02:58), 32.3 (01-30 @ 10:39), 38.7 (01-28 @ 19:45)  WBC: 9.64 (01-31 @ 02:58), 12.07 (01-30 @ 10:39), 14.33 (01-28 @ 19:45)  Plt: 114 (01-31 @ 02:58), 98 (01-30 @ 10:39), 106 (01-28 @ 19:45)      ABG - ( 30 Jan 2022 10:41 )  pH, Arterial: 7.48  pH, Blood: x     /  pCO2: 36    /  pO2: 150   / HCO3: 27    / Base Excess: 3.3   /  SaO2: 99.7                   Seizure, Adult  A seizure is a sudden burst of abnormal activity in the brain. Seizures usually last from 30 seconds to 2 minutes.  There are many types of seizures. And they can cause many different symptoms.  What are the causes?  Common causes of a seizure include:  Fever or infection.  Problems that affect the brain. These may include:  A brain or head injury.  A stroke.  A brain tumor.  Low levels of blood sugar or salt.  Kidney problems or liver problems.  Some inherited conditions. These are passed down from parent to child.  Problems with a substance, such as:  Having a reaction to a drug or a medicine.  Stopping the use of a substance all of a sudden. When this causes problems, it's called withdrawal.  Disorders that affect how you develop, such as autism spectrum disorder or cerebral palsy.  Sometimes, the cause may not be known. Some people who have a seizure never have another one. A person who has repeated seizures over time without a clear cause has a condition called epilepsy.  What increases the risk?  Having a family history of epilepsy.  Having had a tonic–clonic seizure before. This type of seizure causes:  The muscles of the whole body to tighten, or contract.  Loss of consciousness.  Having a head injury or a stroke in the past.  Having had too little oxygen at birth.  What are the signs or symptoms?  The symptoms vary depending on the type of seizure you have.  Symptoms during a seizure  Having convulsions. This means shaking with fast, jerky movements of muscles.  Stiffness of the body.  Breathing problems.  Being confused.  Staring or not responding to sound or touch.  Head nodding, eye blinking, eye twitching, or fast eye movements.  Drooling, grunting, or making clicking sounds with your mouth.  Losing control of when you pee or poop.  Symptoms before a seizure  Feeling afraid, worried, or nervous.  Feeling like you may vomit.  Vertigo. This feels like:  You are moving when you're not.  Things around you are moving when they're not.  Déjà vu. This is a feeling of having seen or heard something before.  Odd tastes or smells.  Changes in how you see. You may see flashing lights or spots.  Symptoms after a seizure  Being confused.  Feeling sleepy.  Headache.  Sore muscles.  How is this diagnosed?  A seizure may be diagnosed based on:  A description of your symptoms. Video of your seizures can be helpful.  Your medical history.  A physical exam.  Tests, such as:  Blood tests.  CT scan.  MRI.  Electroencephalogram, or EEG. This test measures electrical activity in the brain.  A test of your spinal fluid. This is called a spinal tap or lumbar puncture.  How is this treated?  If your seizure stops on its own, you will not need treatment. If your seizure lasts longer than 5 minutes, you'll normally need treatment. This may include:  Medicines given through an IV.  Avoiding things, such as medicines, that are known to cause your seizures.  Medicines to prevent seizures. These are called antiepileptics.  A device to prevent or control seizures.  Eating foods that are low in carbohydrates and high in fat (ketogenic diet).  Surgery. This is sometimes needed if you keep having seizures.  Follow these instructions at home:  Medicines  Take your medicines only as told by your health care provider.  Avoid anything that may keep your medicine from working, such as alcohol.  Activity  Follow your provider's advice about driving, swimming, and doing other things that would be dangerous if you had a seizure. Wait until your provider says it's safe for you to do these things.  If you live in the [place] ask your local department of [employer] [place] when you can drive.  Get enough rest and sleep. Not getting enough sleep can make seizures more likely to happen.  Teaching others  A person helping someone who is on the ground having a seizure. The helper carefully turns the person onto their side.  Teach friends and family what to do if you have a seizure. Tell them to:  Help you get down to the ground safely.  Protect your head and body.  Loosen any clothing around your neck.  Turn you on your side. This helps keep your airway clear if you vomit.  Know whether or not you need emergency care.  Stay with you until you are better.  Also, tell them what not to do if you have a seizure. Tell them:  They should not hold you down.  They should not put anything in your mouth.  General instructions  Avoid anything that has caused you to have seizures.  Keep a seizure diary. Write down:  What you remember about each seizure.  What you think might have caused each seizure.  Keep all follow-up visits. Your provider may need to monitor your progress.  Contact a health care provider if:  You have another seizure or seizures. Call each time you have a seizure.  You have a change in how often or when you have seizures.  You keep having seizures with treatment.  You have symptoms of being sick or having an infection.  You are not able to take your medicine.  Get help right away if:  You have or someone has seen you have:  A seizure that lasts longer than 5 minutes.  Many seizures in a row and you don't feel better between seizures.  A seizure that makes it harder to breathe.  A seizure that leaves you unable to speak or use a part of your body.  You didn't wake up right away after a seizure.  You injure yourself during a seizure.  You have confusion or pain right after a seizure.  These symptoms may be an emergency. Call 911 right away.  Do not wait to see if the symptoms will go away.  Do not drive yourself to the hospital.

## 2024-12-21 NOTE — ED PROVIDER NOTE - PATIENT PORTAL LINK FT
You can access the FollowMyHealth Patient Portal offered by [facility] by registering at the following website: [email or web address]. By joining [facility]’s FollowMyHealth portal, you will also be able to view your health information using other applications (apps) compatible with our system.

## 2024-12-21 NOTE — DISCHARGE NOTE PROVIDER - NSDCFUADDAPPT_GEN_ALL_CORE_FT
Please follow up with Neurology at scheduled appointment. Please call the office to make an appointment.

## 2024-12-21 NOTE — ED PROVIDER NOTE - OTHER FREE TEXT FOR MDM DISCUSSED CASE WITH QUESTION
SANDRA and the admitting resident from last night - no need to re admit pt unless acute issue, sz likely 2/2 to noncompliance w/meds - to f/u w/clinic

## 2024-12-23 LAB — LEVETIRACETAM SERPL-MCNC: <2 UG/ML — LOW (ref 10–40)

## 2024-12-24 DIAGNOSIS — Z01.89 ENCOUNTER FOR OTHER SPECIFIED SPECIAL EXAMINATIONS: ICD-10-CM

## 2024-12-24 LAB — LEVETIRACETAM SERPL-MCNC: 17.2 UG/ML — SIGNIFICANT CHANGE UP (ref 10–40)

## 2024-12-29 DIAGNOSIS — G40.909 EPILEPSY, UNSPECIFIED, NOT INTRACTABLE, WITHOUT STATUS EPILEPTICUS: ICD-10-CM

## 2024-12-29 DIAGNOSIS — M41.9 SCOLIOSIS, UNSPECIFIED: ICD-10-CM

## 2024-12-29 DIAGNOSIS — F10.10 ALCOHOL ABUSE, UNCOMPLICATED: ICD-10-CM

## 2024-12-29 DIAGNOSIS — I10 ESSENTIAL (PRIMARY) HYPERTENSION: ICD-10-CM

## 2025-02-04 ENCOUNTER — NON-APPOINTMENT (OUTPATIENT)
Age: 60
End: 2025-02-04

## 2025-02-04 ENCOUNTER — APPOINTMENT (OUTPATIENT)
Dept: NEUROLOGY | Age: 60
End: 2025-02-04
Payer: COMMERCIAL

## 2025-02-04 VITALS
SYSTOLIC BLOOD PRESSURE: 142 MMHG | HEART RATE: 92 BPM | DIASTOLIC BLOOD PRESSURE: 92 MMHG | BODY MASS INDEX: 20.73 KG/M2 | OXYGEN SATURATION: 95 % | TEMPERATURE: 97.2 F | WEIGHT: 117 LBS | HEIGHT: 63 IN | RESPIRATION RATE: 17 BRPM

## 2025-02-04 PROCEDURE — G2211 COMPLEX E/M VISIT ADD ON: CPT | Mod: NC

## 2025-02-04 PROCEDURE — 99215 OFFICE O/P EST HI 40 MIN: CPT

## 2025-02-05 PROBLEM — G62.9 POLYNEUROPATHY, UNSPECIFIED: Chronic | Status: ACTIVE | Noted: 2024-12-20

## 2025-02-07 ENCOUNTER — APPOINTMENT (OUTPATIENT)
Dept: DERMATOLOGY | Facility: CLINIC | Age: 60
End: 2025-02-07
Payer: COMMERCIAL

## 2025-02-07 DIAGNOSIS — D22.9 MELANOCYTIC NEVI, UNSPECIFIED: ICD-10-CM

## 2025-02-07 DIAGNOSIS — L29.9 PRURITUS, UNSPECIFIED: ICD-10-CM

## 2025-02-07 PROCEDURE — 99214 OFFICE O/P EST MOD 30 MIN: CPT

## 2025-02-09 PROBLEM — D22.9 MELANOCYTIC NEVUS OF SKIN: Status: ACTIVE | Noted: 2025-02-09

## 2025-02-18 ENCOUNTER — RX RENEWAL (OUTPATIENT)
Age: 60
End: 2025-02-18

## 2025-02-18 RX ORDER — MV-MIN NO.83/IRON/FOLATE NO.10 20 MG-1670
TABLET ORAL
Qty: 30 | Refills: 6 | Status: ACTIVE | COMMUNITY
Start: 2025-02-18 | End: 1900-01-01

## 2025-02-18 RX ORDER — MV-MIN NO.83/IRON/FOLATE NO.10 20 MG-1670
TABLET ORAL
Qty: 90 | Refills: 3 | Status: ACTIVE | COMMUNITY
Start: 2025-02-18 | End: 1900-01-01

## 2025-03-31 ENCOUNTER — RX RENEWAL (OUTPATIENT)
Age: 60
End: 2025-03-31

## 2025-04-03 ENCOUNTER — APPOINTMENT (OUTPATIENT)
Dept: NEUROLOGY | Age: 60
End: 2025-04-03

## 2025-04-03 PROBLEM — F04: Status: ACTIVE | Noted: 2025-04-03

## 2025-04-03 PROCEDURE — 99212 OFFICE O/P EST SF 10 MIN: CPT | Mod: 93

## 2025-04-03 PROCEDURE — 99213 OFFICE O/P EST LOW 20 MIN: CPT

## 2025-04-08 ENCOUNTER — APPOINTMENT (OUTPATIENT)
Dept: NEUROLOGY | Facility: CLINIC | Age: 60
End: 2025-04-08
Payer: COMMERCIAL

## 2025-04-08 VITALS
OXYGEN SATURATION: 97 % | BODY MASS INDEX: 20.73 KG/M2 | TEMPERATURE: 96.3 F | WEIGHT: 117 LBS | SYSTOLIC BLOOD PRESSURE: 149 MMHG | HEART RATE: 81 BPM | DIASTOLIC BLOOD PRESSURE: 88 MMHG | HEIGHT: 63 IN

## 2025-04-08 DIAGNOSIS — R41.3 OTHER AMNESIA: ICD-10-CM

## 2025-04-08 DIAGNOSIS — R56.9 UNSPECIFIED CONVULSIONS: ICD-10-CM

## 2025-04-08 DIAGNOSIS — F04 AMNESTIC DISORDER DUE TO KNOWN PHYSIOLOGICAL CONDITION: ICD-10-CM

## 2025-04-08 PROCEDURE — G2211 COMPLEX E/M VISIT ADD ON: CPT | Mod: NC

## 2025-04-08 PROCEDURE — 99214 OFFICE O/P EST MOD 30 MIN: CPT

## 2025-04-09 ENCOUNTER — EMERGENCY (EMERGENCY)
Facility: HOSPITAL | Age: 60
LOS: 1 days | End: 2025-04-09
Attending: EMERGENCY MEDICINE | Admitting: EMERGENCY MEDICINE
Payer: COMMERCIAL

## 2025-04-09 VITALS
HEART RATE: 81 BPM | OXYGEN SATURATION: 95 % | SYSTOLIC BLOOD PRESSURE: 145 MMHG | DIASTOLIC BLOOD PRESSURE: 80 MMHG | RESPIRATION RATE: 18 BRPM | TEMPERATURE: 98 F

## 2025-04-09 VITALS
RESPIRATION RATE: 18 BRPM | OXYGEN SATURATION: 90 % | HEART RATE: 87 BPM | DIASTOLIC BLOOD PRESSURE: 76 MMHG | SYSTOLIC BLOOD PRESSURE: 144 MMHG | TEMPERATURE: 98 F

## 2025-04-09 DIAGNOSIS — R09.02 HYPOXEMIA: ICD-10-CM

## 2025-04-09 DIAGNOSIS — F41.9 ANXIETY DISORDER, UNSPECIFIED: ICD-10-CM

## 2025-04-09 DIAGNOSIS — R68.2 DRY MOUTH, UNSPECIFIED: ICD-10-CM

## 2025-04-09 DIAGNOSIS — R56.9 UNSPECIFIED CONVULSIONS: ICD-10-CM
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
## 2025-04-09 DIAGNOSIS — Z86.69 PERSONAL HISTORY OF OTHER DISEASES OF THE NERVOUS SYSTEM AND SENSE ORGANS: ICD-10-CM

## 2025-04-09 DIAGNOSIS — F04 AMNESTIC DISORDER DUE TO KNOWN PHYSIOLOGICAL CONDITION: ICD-10-CM

## 2025-04-09 DIAGNOSIS — Z86.73 PERSONAL HISTORY OF TRANSIENT ISCHEMIC ATTACK (TIA), AND CEREBRAL INFARCTION WITHOUT RESIDUAL DEFICITS: ICD-10-CM

## 2025-04-09 DIAGNOSIS — F17.200 NICOTINE DEPENDENCE, UNSPECIFIED, UNCOMPLICATED: ICD-10-CM

## 2025-04-09 LAB
ADD ON TEST-SPECIMEN IN LAB: SIGNIFICANT CHANGE UP
ANION GAP SERPL CALC-SCNC: 11 MMOL/L — SIGNIFICANT CHANGE UP (ref 5–17)
BASOPHILS # BLD AUTO: 0.04 K/UL — SIGNIFICANT CHANGE UP (ref 0–0.2)
BASOPHILS NFR BLD AUTO: 0.5 % — SIGNIFICANT CHANGE UP (ref 0–2)
BUN SERPL-MCNC: 4 MG/DL — LOW (ref 7–23)
CALCIUM SERPL-MCNC: 7.7 MG/DL — LOW (ref 8.4–10.5)
CHLORIDE SERPL-SCNC: 101 MMOL/L — SIGNIFICANT CHANGE UP (ref 96–108)
CO2 SERPL-SCNC: 20 MMOL/L — LOW (ref 22–31)
CREAT SERPL-MCNC: 0.29 MG/DL — LOW (ref 0.5–1.3)
EGFR: 123 ML/MIN/1.73M2 — SIGNIFICANT CHANGE UP
EGFR: 123 ML/MIN/1.73M2 — SIGNIFICANT CHANGE UP
EOSINOPHIL # BLD AUTO: 0 K/UL — SIGNIFICANT CHANGE UP (ref 0–0.5)
EOSINOPHIL NFR BLD AUTO: 0 % — SIGNIFICANT CHANGE UP (ref 0–6)
FLUAV AG NPH QL: SIGNIFICANT CHANGE UP
FLUBV AG NPH QL: SIGNIFICANT CHANGE UP
GLUCOSE SERPL-MCNC: 110 MG/DL — HIGH (ref 70–99)
HCT VFR BLD CALC: 35.6 % — SIGNIFICANT CHANGE UP (ref 34.5–45)
HGB BLD-MCNC: 12.4 G/DL — SIGNIFICANT CHANGE UP (ref 11.5–15.5)
IMM GRANULOCYTES NFR BLD AUTO: 0.4 % — SIGNIFICANT CHANGE UP (ref 0–0.9)
LYMPHOCYTES # BLD AUTO: 0.69 K/UL — LOW (ref 1–3.3)
LYMPHOCYTES # BLD AUTO: 9.5 % — LOW (ref 13–44)
MCHC RBC-ENTMCNC: 34.3 PG — HIGH (ref 27–34)
MCHC RBC-ENTMCNC: 34.8 G/DL — SIGNIFICANT CHANGE UP (ref 32–36)
MCV RBC AUTO: 98.6 FL — SIGNIFICANT CHANGE UP (ref 80–100)
MONOCYTES # BLD AUTO: 0.58 K/UL — SIGNIFICANT CHANGE UP (ref 0–0.9)
MONOCYTES NFR BLD AUTO: 8 % — SIGNIFICANT CHANGE UP (ref 2–14)
NEUTROPHILS # BLD AUTO: 5.94 K/UL — SIGNIFICANT CHANGE UP (ref 1.8–7.4)
NEUTROPHILS NFR BLD AUTO: 81.6 % — HIGH (ref 43–77)
NRBC BLD AUTO-RTO: 0 /100 WBCS — SIGNIFICANT CHANGE UP (ref 0–0)
PLATELET # BLD AUTO: 217 K/UL — SIGNIFICANT CHANGE UP (ref 150–400)
POTASSIUM SERPL-MCNC: 3.2 MMOL/L — LOW (ref 3.5–5.3)
POTASSIUM SERPL-SCNC: 3.2 MMOL/L — LOW (ref 3.5–5.3)
RBC # BLD: 3.61 M/UL — LOW (ref 3.8–5.2)
RBC # FLD: 12.6 % — SIGNIFICANT CHANGE UP (ref 10.3–14.5)
RSV RNA NPH QL NAA+NON-PROBE: SIGNIFICANT CHANGE UP
SARS-COV-2 RNA SPEC QL NAA+PROBE: SIGNIFICANT CHANGE UP
SODIUM SERPL-SCNC: 132 MMOL/L — LOW (ref 135–145)
SOURCE RESPIRATORY: SIGNIFICANT CHANGE UP
WBC # BLD: 7.28 K/UL — SIGNIFICANT CHANGE UP (ref 3.8–10.5)
WBC # FLD AUTO: 7.28 K/UL — SIGNIFICANT CHANGE UP (ref 3.8–10.5)

## 2025-04-09 PROCEDURE — 71045 X-RAY EXAM CHEST 1 VIEW: CPT

## 2025-04-09 PROCEDURE — 83735 ASSAY OF MAGNESIUM: CPT

## 2025-04-09 PROCEDURE — 99285 EMERGENCY DEPT VISIT HI MDM: CPT

## 2025-04-09 PROCEDURE — 80048 BASIC METABOLIC PNL TOTAL CA: CPT

## 2025-04-09 PROCEDURE — 99283 EMERGENCY DEPT VISIT LOW MDM: CPT | Mod: 25

## 2025-04-09 PROCEDURE — 71045 X-RAY EXAM CHEST 1 VIEW: CPT | Mod: 26

## 2025-04-09 PROCEDURE — 87637 SARSCOV2&INF A&B&RSV AMP PRB: CPT

## 2025-04-09 PROCEDURE — 36415 COLL VENOUS BLD VENIPUNCTURE: CPT

## 2025-04-09 PROCEDURE — 80177 DRUG SCRN QUAN LEVETIRACETAM: CPT

## 2025-04-09 PROCEDURE — 85025 COMPLETE CBC W/AUTO DIFF WBC: CPT

## 2025-04-09 RX ORDER — MAGNESIUM OXIDE 400 MG
400 TABLET ORAL ONCE
Refills: 0 | Status: COMPLETED | OUTPATIENT
Start: 2025-04-09 | End: 2025-04-09

## 2025-04-09 RX ADMIN — Medication 400 MILLIGRAM(S): at 18:38

## 2025-04-09 RX ADMIN — Medication 1000 MILLILITER(S): at 16:53

## 2025-04-09 RX ADMIN — Medication 40 MILLIEQUIVALENT(S): at 17:39

## 2025-04-11 LAB — LEVETIRACETAM SERPL-MCNC: <2 UG/ML — LOW (ref 10–40)

## 2025-04-25 ENCOUNTER — APPOINTMENT (OUTPATIENT)
Dept: NEUROLOGY | Facility: CLINIC | Age: 60
End: 2025-04-25
Payer: SELF-PAY

## 2025-04-25 PROCEDURE — 96133 NRPSYC TST EVAL PHYS/QHP EA: CPT

## 2025-04-25 PROCEDURE — 96116 NUBHVL XM PHYS/QHP 1ST HR: CPT

## 2025-04-25 PROCEDURE — 96137 PSYCL/NRPSYC TST PHY/QHP EA: CPT

## 2025-04-25 PROCEDURE — 96132 NRPSYC TST EVAL PHYS/QHP 1ST: CPT

## 2025-04-25 PROCEDURE — 96136 PSYCL/NRPSYC TST PHY/QHP 1ST: CPT

## 2025-04-25 NOTE — ED ADULT NURSE NOTE - NS ED NURSE LEVEL OF CONSCIOUSNESS AFFECT
[Time Spent: ___ minutes] : I have spent [unfilled] minutes of time on the encounter which excludes teaching and separately reported services. Calm

## 2025-05-19 ENCOUNTER — APPOINTMENT (OUTPATIENT)
Dept: NEUROLOGY | Facility: CLINIC | Age: 60
End: 2025-05-19

## 2025-05-19 PROCEDURE — 96133 NRPSYC TST EVAL PHYS/QHP EA: CPT

## 2025-06-13 ENCOUNTER — EMERGENCY (EMERGENCY)
Facility: HOSPITAL | Age: 60
LOS: 1 days | End: 2025-06-13
Attending: EMERGENCY MEDICINE | Admitting: EMERGENCY MEDICINE
Payer: COMMERCIAL

## 2025-06-13 VITALS
HEART RATE: 96 BPM | RESPIRATION RATE: 18 BRPM | SYSTOLIC BLOOD PRESSURE: 121 MMHG | OXYGEN SATURATION: 96 % | TEMPERATURE: 98 F | DIASTOLIC BLOOD PRESSURE: 82 MMHG

## 2025-06-13 VITALS
RESPIRATION RATE: 16 BRPM | TEMPERATURE: 98 F | HEART RATE: 72 BPM | DIASTOLIC BLOOD PRESSURE: 79 MMHG | OXYGEN SATURATION: 94 % | WEIGHT: 145.06 LBS | HEIGHT: 65 IN | SYSTOLIC BLOOD PRESSURE: 148 MMHG

## 2025-06-13 DIAGNOSIS — R56.9 UNSPECIFIED CONVULSIONS: ICD-10-CM

## 2025-06-13 DIAGNOSIS — E87.1 HYPO-OSMOLALITY AND HYPONATREMIA: ICD-10-CM

## 2025-06-13 DIAGNOSIS — F17.200 NICOTINE DEPENDENCE, UNSPECIFIED, UNCOMPLICATED: ICD-10-CM

## 2025-06-13 DIAGNOSIS — E87.6 HYPOKALEMIA: ICD-10-CM

## 2025-06-13 DIAGNOSIS — Z86.73 PERSONAL HISTORY OF TRANSIENT ISCHEMIC ATTACK (TIA), AND CEREBRAL INFARCTION WITHOUT RESIDUAL DEFICITS: ICD-10-CM

## 2025-06-13 DIAGNOSIS — E83.42 HYPOMAGNESEMIA: ICD-10-CM

## 2025-06-13 LAB
ALBUMIN SERPL ELPH-MCNC: 4.2 G/DL — SIGNIFICANT CHANGE UP (ref 3.3–5)
ALP SERPL-CCNC: 101 U/L — SIGNIFICANT CHANGE UP (ref 40–120)
ALT FLD-CCNC: 42 U/L — SIGNIFICANT CHANGE UP (ref 10–45)
ANION GAP SERPL CALC-SCNC: 15 MMOL/L — SIGNIFICANT CHANGE UP (ref 5–17)
ANION GAP SERPL CALC-SCNC: 17 MMOL/L — SIGNIFICANT CHANGE UP (ref 5–17)
AST SERPL-CCNC: 63 U/L — HIGH (ref 10–40)
BASOPHILS # BLD AUTO: 0.04 K/UL — SIGNIFICANT CHANGE UP (ref 0–0.2)
BASOPHILS NFR BLD AUTO: 0.6 % — SIGNIFICANT CHANGE UP (ref 0–2)
BILIRUB SERPL-MCNC: 0.8 MG/DL — SIGNIFICANT CHANGE UP (ref 0.2–1.2)
BUN SERPL-MCNC: 2 MG/DL — LOW (ref 7–23)
BUN SERPL-MCNC: 2 MG/DL — LOW (ref 7–23)
CALCIUM SERPL-MCNC: 9.1 MG/DL — SIGNIFICANT CHANGE UP (ref 8.4–10.5)
CALCIUM SERPL-MCNC: 9.4 MG/DL — SIGNIFICANT CHANGE UP (ref 8.4–10.5)
CHLORIDE SERPL-SCNC: 87 MMOL/L — LOW (ref 96–108)
CHLORIDE SERPL-SCNC: 97 MMOL/L — SIGNIFICANT CHANGE UP (ref 96–108)
CK SERPL-CCNC: 176 U/L — HIGH (ref 25–170)
CO2 SERPL-SCNC: 23 MMOL/L — SIGNIFICANT CHANGE UP (ref 22–31)
CO2 SERPL-SCNC: 24 MMOL/L — SIGNIFICANT CHANGE UP (ref 22–31)
CREAT SERPL-MCNC: 0.32 MG/DL — LOW (ref 0.5–1.3)
CREAT SERPL-MCNC: 0.33 MG/DL — LOW (ref 0.5–1.3)
EGFR: 119 ML/MIN/1.73M2 — SIGNIFICANT CHANGE UP
EGFR: 119 ML/MIN/1.73M2 — SIGNIFICANT CHANGE UP
EGFR: 120 ML/MIN/1.73M2 — SIGNIFICANT CHANGE UP
EGFR: 120 ML/MIN/1.73M2 — SIGNIFICANT CHANGE UP
EOSINOPHIL # BLD AUTO: 0.01 K/UL — SIGNIFICANT CHANGE UP (ref 0–0.5)
EOSINOPHIL NFR BLD AUTO: 0.1 % — SIGNIFICANT CHANGE UP (ref 0–6)
ETHANOL SERPL-MCNC: <10 MG/DL — SIGNIFICANT CHANGE UP (ref 0–10)
GLUCOSE SERPL-MCNC: 112 MG/DL — HIGH (ref 70–99)
GLUCOSE SERPL-MCNC: 133 MG/DL — HIGH (ref 70–99)
HCT VFR BLD CALC: 38.2 % — SIGNIFICANT CHANGE UP (ref 34.5–45)
HGB BLD-MCNC: 13.6 G/DL — SIGNIFICANT CHANGE UP (ref 11.5–15.5)
IMM GRANULOCYTES NFR BLD AUTO: 0.4 % — SIGNIFICANT CHANGE UP (ref 0–0.9)
LACTATE SERPL-SCNC: 1.7 MMOL/L — SIGNIFICANT CHANGE UP (ref 0.5–2)
LYMPHOCYTES # BLD AUTO: 0.61 K/UL — LOW (ref 1–3.3)
LYMPHOCYTES # BLD AUTO: 8.8 % — LOW (ref 13–44)
MAGNESIUM SERPL-MCNC: 1.4 MG/DL — LOW (ref 1.6–2.6)
MCHC RBC-ENTMCNC: 35.2 PG — HIGH (ref 27–34)
MCHC RBC-ENTMCNC: 35.6 G/DL — SIGNIFICANT CHANGE UP (ref 32–36)
MCV RBC AUTO: 99 FL — SIGNIFICANT CHANGE UP (ref 80–100)
MONOCYTES # BLD AUTO: 0.6 K/UL — SIGNIFICANT CHANGE UP (ref 0–0.9)
MONOCYTES NFR BLD AUTO: 8.6 % — SIGNIFICANT CHANGE UP (ref 2–14)
NEUTROPHILS # BLD AUTO: 5.68 K/UL — SIGNIFICANT CHANGE UP (ref 1.8–7.4)
NEUTROPHILS NFR BLD AUTO: 81.5 % — HIGH (ref 43–77)
NRBC BLD AUTO-RTO: 0 /100 WBCS — SIGNIFICANT CHANGE UP (ref 0–0)
PLATELET # BLD AUTO: 192 K/UL — SIGNIFICANT CHANGE UP (ref 150–400)
POTASSIUM SERPL-MCNC: 3.4 MMOL/L — LOW (ref 3.5–5.3)
POTASSIUM SERPL-MCNC: 4 MMOL/L — SIGNIFICANT CHANGE UP (ref 3.5–5.3)
POTASSIUM SERPL-SCNC: 3.4 MMOL/L — LOW (ref 3.5–5.3)
POTASSIUM SERPL-SCNC: 4 MMOL/L — SIGNIFICANT CHANGE UP (ref 3.5–5.3)
PROT SERPL-MCNC: 7.2 G/DL — SIGNIFICANT CHANGE UP (ref 6–8.3)
RBC # BLD: 3.86 M/UL — SIGNIFICANT CHANGE UP (ref 3.8–5.2)
RBC # FLD: 13.9 % — SIGNIFICANT CHANGE UP (ref 10.3–14.5)
SODIUM SERPL-SCNC: 127 MMOL/L — LOW (ref 135–145)
SODIUM SERPL-SCNC: 136 MMOL/L — SIGNIFICANT CHANGE UP (ref 135–145)
WBC # BLD: 6.97 K/UL — SIGNIFICANT CHANGE UP (ref 3.8–10.5)
WBC # FLD AUTO: 6.97 K/UL — SIGNIFICANT CHANGE UP (ref 3.8–10.5)

## 2025-06-13 PROCEDURE — 96374 THER/PROPH/DIAG INJ IV PUSH: CPT

## 2025-06-13 PROCEDURE — 82550 ASSAY OF CK (CPK): CPT

## 2025-06-13 PROCEDURE — 36415 COLL VENOUS BLD VENIPUNCTURE: CPT

## 2025-06-13 PROCEDURE — 82962 GLUCOSE BLOOD TEST: CPT

## 2025-06-13 PROCEDURE — 85025 COMPLETE CBC W/AUTO DIFF WBC: CPT

## 2025-06-13 PROCEDURE — 80307 DRUG TEST PRSMV CHEM ANLYZR: CPT

## 2025-06-13 PROCEDURE — 99284 EMERGENCY DEPT VISIT MOD MDM: CPT | Mod: 25

## 2025-06-13 PROCEDURE — 83605 ASSAY OF LACTIC ACID: CPT

## 2025-06-13 PROCEDURE — 80053 COMPREHEN METABOLIC PANEL: CPT

## 2025-06-13 PROCEDURE — 83735 ASSAY OF MAGNESIUM: CPT

## 2025-06-13 PROCEDURE — 80048 BASIC METABOLIC PNL TOTAL CA: CPT

## 2025-06-13 PROCEDURE — 93005 ELECTROCARDIOGRAM TRACING: CPT

## 2025-06-13 PROCEDURE — 96375 TX/PRO/DX INJ NEW DRUG ADDON: CPT

## 2025-06-13 PROCEDURE — 99285 EMERGENCY DEPT VISIT HI MDM: CPT

## 2025-06-13 RX ORDER — LEVETIRACETAM 10 MG/ML
1000 INJECTION, SOLUTION INTRAVENOUS ONCE
Refills: 0 | Status: DISCONTINUED | OUTPATIENT
Start: 2025-06-13 | End: 2025-06-13

## 2025-06-13 RX ORDER — MAGNESIUM SULFATE 500 MG/ML
2 SYRINGE (ML) INJECTION ONCE
Refills: 0 | Status: COMPLETED | OUTPATIENT
Start: 2025-06-13 | End: 2025-06-13

## 2025-06-13 RX ADMIN — Medication 1000 MILLILITER(S): at 02:23

## 2025-06-13 RX ADMIN — Medication 25 GRAM(S): at 02:23

## 2025-06-13 RX ADMIN — LEVETIRACETAM 1000 MILLIGRAM(S): 10 INJECTION, SOLUTION INTRAVENOUS at 01:32

## 2025-06-13 RX ADMIN — Medication 40 MILLIEQUIVALENT(S): at 02:22

## 2025-06-13 RX ADMIN — Medication 100 MILLIEQUIVALENT(S): at 02:23

## 2025-06-13 NOTE — ED PROVIDER NOTE - OBJECTIVE STATEMENT
59F hx cva, etoh abuse, seizures (on keppra), BIBEMS s/p seizure tonight. pt states occurred while she was in bed. states now feeling tired and cold. no vomiting. no HA. no chest pain, no SOB.  states pt drinks daily and does not always take medication. pt states she took keppra today. no incontinence. no tongue laceration.

## 2025-06-13 NOTE — ED PROVIDER NOTE - PROGRESS NOTE DETAILS
repleting mg and K Na and K improved. pt with steady gait, no tremors, no evidence of alcohol withdrawal currently, recommend f/u with epilepsy  I have discussed the discharge plan with the patient. The patient agrees with the plan, as discussed.  The patient understands Emergency Department diagnosis is a preliminary diagnosis often based on limited information and that the patient must adhere to the follow-up plan as discussed.  The patient understands that if the symptoms worsen the patient may return to the Emergency Department at any time for further evaluation and treatment.

## 2025-06-13 NOTE — ED PROVIDER NOTE - CARE PROVIDERS DIRECT ADDRESSES
[email or web address]

## 2025-06-13 NOTE — ED ADULT TRIAGE NOTE - CHIEF COMPLAINT QUOTE
seizures at home. did not take keppra for 10 days. as per  pt drinks everyday. last drink was today when he came home and saw empty bottle of wine next to her.

## 2025-06-13 NOTE — ED PROVIDER NOTE - PRO INTERPRETER NEED 2
Addended by: JERARDO ANTHONY on: 12/19/2018 04:28 PM     Modules accepted: Orders     walker English

## 2025-06-13 NOTE — ED PROVIDER NOTE - NSFOLLOWUPINSTRUCTIONS_ED_ALL_ED_FT
Follow-up with epilepsy    Epilepsy    WHAT YOU NEED TO KNOW:    Epilepsy is a brain disorder that causes seizures. It is also called a seizure disorder. A seizure means an abnormal area in your brain sometimes sends bursts of electrical activity. A seizure may start in one part of your brain, or both sides may be affected. Depending on the type of seizure, you may have movements you cannot control, lose consciousness, or stare straight ahead. You may be confused or tired after the seizure. A seizure may last a few seconds or longer than 5 minutes. A birth defect, tumor, stroke, dementia, injury, or infection may cause epilepsy. The cause of your epilepsy may not be known. If your seizures are not controlled, epilepsy may become life-threatening.    DISCHARGE INSTRUCTIONS:    Call your local emergency number (661 in the ), or have someone else call, for any of the following:    Your seizure lasts longer than 5 minutes.    You have trouble breathing after a seizure.    You have diabetes or are pregnant and have a seizure.    You have a seizure in water, such as a swimming pool or bathtub.  Call your doctor if:    You have a second seizure within 24 hours of the first.    You are injured during a seizure.    You feel you are not able to cope with your condition.    Your seizures start to happen more often.    You are confused longer than usual after a seizure.    You are planning to get pregnant or are currently pregnant.    You have questions or concerns about your condition or care.  Medicines:    Antiseizure medicine may control or prevent another seizure. Do not stop taking this medicine. Another person may need to give you rescue medicine to stop a seizure at home. Ask your healthcare provider for more information about rescue medicine.    Take your medicine as directed. Contact your healthcare provider if you think your medicine is not helping or if you have side effects. Tell your provider if you are allergic to any medicine. Keep a list of the medicines, vitamins, and herbs you take. Include the amounts, and when and why you take them. Bring the list or the pill bottles to follow-up visits. Carry your medicine list with you in case of an emergency.  Follow up with your neurologist as directed: You may need tests to check the level of antiseizure medicine in your blood. Your neurologist may need to change or adjust your medicine. Write down your questions so you remember to ask them during your visits.    Prevent a complication of epilepsy:    Sudden unexplained death in epilepsy (SUDEP) is a rare complication of epilepsy. In 1 year, 1 adult in 1,000 adults with epilepsy will have this complication. The risk of SUDEP increases if you have 3 or more generalized tonic-clonic seizures in 1 year. Your risk also increases if you have nocturnal seizures (seizures during sleep). After a nocturnal seizure, your breathing can become shallow.    Your healthcare provider may recommend a change in medicine to decrease the number of seizures. For nocturnal seizures, he or she may recommend that someone sleep near you. The person must be older than 10 years. The person must also be close enough to know that you are having a seizure.  What you can do to prevent a seizure: You may not be able to prevent every seizure. The following can help you manage triggers that may make a seizure start:    Take your medicine every day at the same time. This will also help prevent medicine side effects. Set an alarm to help remind you to take your medicine every day.    Manage stress. Stress can be a trigger for epilepsy. Exercise can help you reduce stress. Talk to your healthcare provider about exercise that is safe for you. Illness can be a form of stress. Eat a variety of healthy foods and drink plenty of liquids during an illness. Talk to your healthcare provider about other ways to manage stress.    Set a regular sleep schedule. A lack of sleep can trigger a seizure. Try to go to sleep and wake up at the same time every day. Keep your bedroom quiet and dark. Talk to your healthcare provider if you are having trouble sleeping.    Limit or do not drink alcohol as directed. Alcohol can trigger a seizure, especially if you drink a large amount at one time. A drink of alcohol is 12 ounces of beer, 1½ ounces of liquor, or 5 ounces of wine. Talk to your healthcare provider about a safe amount of alcohol for you. Your provider may recommend that you do not drink any alcohol. Tell him or her if you need help to quit drinking.  What you can do to manage epilepsy:    Keep a seizure diary. This can help you find your triggers and avoid them. Write down the dates of your seizures, where you were, and what you were doing. Include how you felt before and after. Possible triggers include illness, lack of sleep, hormonal changes, alcohol, drugs, lights, or stress.    Record any auras you have before a seizure. An aura is a sign that you are about to have a seizure. Auras happen before certain types of seizures that are in only 1 part of the brain. The aura may happen seconds before a seizure, or up to an hour before. You may feel, see, hear, or smell something. Examples include part of your body becoming hot. You may see a flash of light or hear something. You may have anxiety or déjà vu. If you have an aura, include it in your seizure diary.    Create a care plan. Tell family, friends, and coworkers about your epilepsy. Give them instructions that tell them how they can keep you safe if you have a seizure.    Find support. You may be referred to a psychologist or . Ask your healthcare provider about support groups for people with epilepsy.    Ask what safety precautions you should take. Talk with your healthcare provider about driving. You may not be able to drive until you are seizure-free for a period of time. You will need to check the law where you live. Also talk to your healthcare provider about swimming and bathing. You may drown or develop life-threatening heart or lung damage if you have a seizure in water.    Carry medical alert identification. Wear medical alert jewelry or carry a card that says you have epilepsy. Ask your healthcare provider where to get these items.    How others can keep you safe during a seizure: Give the following instructions to family, friends, and coworkers:    Do not panic.    Do not hold me down or put anything in my mouth.    Gently guide me to the floor or a soft surface.    Place me on my side to help prevent me from swallowing saliva or vomit.    Protect me from injury. Remove sharp or hard objects from the area surrounding me, or cushion my head.    Loosen the clothing around my head and neck.    Time how long my seizure lasts. Call 911 if my seizure lasts longer than 5 minutes or if I have a second seizure.    Stay with me until my seizure ends. Let me rest until I am fully awake.    Perform CPR if I stop breathing or you cannot feel my pulse.    Do not give me anything to eat or drink until I am fully awake.    Hypomagnesemia    WHAT YOU NEED TO KNOW:    Hypomagnesemia is a condition that develops when the amount of magnesium in your body is too low. Magnesium is a mineral that helps your heart, muscles, and nerves work normally. It also helps strengthen your bones.    DISCHARGE INSTRUCTIONS:    Call your local emergency number (911 in the [place]) if:    You are not able to move your muscles, and you have trouble thinking clearly.    You have a seizure.  Return to the emergency department if:    You have numbness and tingling in your arms or legs.    You have painful muscle spasms and tremors in your arms or legs.    Your heartbeat is faster than usual, or is irregular.  Call your doctor or dietitian if:    You have fatigue and muscle tremors or twitching.    You become irritable and have trouble sleeping.    You have questions or concerns about your condition or care.  Prevent hypomagnesemia:    Manage any health conditions you have. Health conditions such as congestive heart failure, diabetes, and chronic diarrhea increase your risk for hypomagnesemia. Work with healthcare providers to create and follow a treatment plan, if needed.    Limit or do not drink alcohol. Alcohol can prevent your body from absorbing magnesium. Alcohol also makes your body release large amounts of magnesium through your urine.    Take a magnesium supplement, if directed. Ask your healthcare provider which supplement to take and how often to take it.    Eat foods that contain magnesium every day. Ask your dietitian or provider how much magnesium you need each day.  Foods that contain magnesium:    Almonds, cashews, peanuts, and peanut butter    Dark green leafy vegetables, such as spinach    Raisins, bananas, apples, broccoli, and carrots    Soy milk and soy beans    Black beans and kidney beans    Whole-wheat bread and brown rice    Shredded-wheat cereal, oatmeal, and other breakfast cereals fortified with magnesium    Plain low-fat yogurt and milk    Cooked halibut  Follow up with your doctor or dietitian as directed: Write down your questions so you remember to ask them during your visits.    Hypokalemia    WHAT YOU NEED TO KNOW:    Hypokalemia is a low level of potassium in your blood. Potassium helps control how your muscles, heart, and digestive system work. Hypokalemia occurs when your body loses too much potassium or does not absorb enough from food.    DISCHARGE INSTRUCTIONS:    Return to the emergency department if:    You cannot move your arm or leg.    You have a fast or irregular heartbeat.    You are too tired or weak to stand up.  Call your doctor if:    You are vomiting or have diarrhea.    You have numbness or tingling in your arms or legs.    Your symptoms do not go away, or they get worse.    You have questions or concerns about your condition or care.  Medicines:    Potassium will be given to bring your potassium levels back to normal.    Take your medicine as directed. Contact your healthcare provider if you think your medicine is not helping or if you have side effects. Tell your provider if you are allergic to any medicine. Keep a list of the medicines, vitamins, and herbs you take. Include the amounts, and when and why you take them. Bring the list or the pill bottles to follow-up visits. Carry your medicine list with you in case of an emergency.  Eat foods that are high in potassium: Examples include bananas, potatoes, and avocados. Lassiter beans, turkey, salmon, lean beef, yogurt, and milk are also high in potassium. Your healthcare provider or dietitian can help you create a meal plan to meet your daily potassium needs.    Follow up with your doctor or dietitian as directed: Write down your questions so you remember to ask them during your visits.

## 2025-06-13 NOTE — ED PROVIDER NOTE - PATIENT PORTAL LINK FT
You can access the FollowMyHealth Patient Portal offered by [facility] by registering at the following website: [email or web address]. By joining [facility] FollowMyHealth portal, you will also be able to view your health information using other applications (apps) compatible with our system.

## 2025-06-13 NOTE — ED PROVIDER NOTE - CLINICAL SUMMARY MEDICAL DECISION MAKING FREE TEXT BOX
s/p seizure tonight, daily drinker, no tremors, no evidence of alcohol withdrawal currently, no focal neuro deficits, no HA, afebrile. denies head injury. ?compliance with keppra along with alcohol use likely lowering seizure threshold  -check labs  -load with keppra

## 2025-06-13 NOTE — ED PROVIDER NOTE - CARE PLAN
Patient scheduled for abdominal CT and appointment with Dr. Alberto Song on 4/16. 1 Principal Discharge DX:	Seizure  Secondary Diagnosis:	Hypomagnesemia  Secondary Diagnosis:	Hypokalemia  Secondary Diagnosis:	Hyponatremia

## 2025-06-13 NOTE — ED ADULT NURSE NOTE - OBJECTIVE STATEMENT
59F BIBEMS from home s/p seizures after not taking her keppra x 10 days due to patient drinking bottle of wine nightly per  who saw empty bottle of wine next to her. Noted strong etoh-like aroma on patient. Patient A&Ox4, able to verbalize needs, follows commands. GCS 15. No s/s of acute distress noted.

## 2025-06-13 NOTE — ED ADULT NURSE NOTE - NS ED NURSE LEVEL OF CONSCIOUSNESS AFFECT
Case created.  Patient currently admitted to [facility]. Will place on pause status.  
Appropriate

## 2025-06-13 NOTE — ED PROVIDER NOTE - CARE PROVIDER_API CALL
Jeremiah Cantrell  Neurology  [address]  [place] [address]  Phone: [phone]  Fax: [phone]  Follow Up Time:

## 2025-07-07 ENCOUNTER — APPOINTMENT (OUTPATIENT)
Dept: NEUROLOGY | Facility: CLINIC | Age: 60
End: 2025-07-07

## 2025-07-11 ENCOUNTER — APPOINTMENT (OUTPATIENT)
Dept: DERMATOLOGY | Facility: CLINIC | Age: 60
End: 2025-07-11
Payer: COMMERCIAL

## 2025-07-11 PROCEDURE — 99213 OFFICE O/P EST LOW 20 MIN: CPT

## (undated) DEVICE — SNARE CAPTIVATOR II 20MM

## (undated) DEVICE — Device

## (undated) DEVICE — POLY TRAP ETRAP

## (undated) DEVICE — LIFTER SYR EVERLIFT AGENT SUBMUCOSAL 10ML BLU

## (undated) DEVICE — ELCTR GROUNDING PAD ADULT COVIDIEN

## (undated) DEVICE — FORCEP RADIAL JAW 4 W NDL 2.2MM 2.8MM 240CM ORANGE DISP

## (undated) DEVICE — FORCEP RADIAL JAW 4 JUMBO 2.8MM 3.2MM 240CM ORANGE DISP